# Patient Record
Sex: FEMALE | Race: WHITE | NOT HISPANIC OR LATINO | Employment: OTHER | URBAN - METROPOLITAN AREA
[De-identification: names, ages, dates, MRNs, and addresses within clinical notes are randomized per-mention and may not be internally consistent; named-entity substitution may affect disease eponyms.]

---

## 2017-01-11 DIAGNOSIS — Z12.11 SPECIAL SCREENING FOR MALIGNANT NEOPLASMS, COLON: Primary | ICD-10-CM

## 2017-01-11 RX ORDER — SODIUM, POTASSIUM,MAG SULFATES 17.5-3.13G
1 SOLUTION, RECONSTITUTED, ORAL ORAL AS DIRECTED
Qty: 354 ML | Refills: 0 | Status: ON HOLD | OUTPATIENT
Start: 2017-01-11 | End: 2017-03-08 | Stop reason: ALTCHOICE

## 2017-02-16 ENCOUNTER — TELEPHONE (OUTPATIENT)
Dept: OBSTETRICS AND GYNECOLOGY | Facility: CLINIC | Age: 57
End: 2017-02-16

## 2017-02-16 DIAGNOSIS — Z12.31 VISIT FOR SCREENING MAMMOGRAM: Primary | ICD-10-CM

## 2017-02-16 NOTE — TELEPHONE ENCOUNTER
----- Message from Juliana Petersen sent at 2/15/2017  4:12 PM CST -----  Contact: Patient   X _1st Request  _  2nd Request  _  3rd Request    Who:ABDULAZIZ ACKERMAN [[id number]]    Why:Ken called to request annual mammogram orders for a 3D mammogram    What Number to Call Back:Isaac can be reached at [phone]     When to Expect a call back: (Before the end of the day)   -- if call after 3:00 call back will be tomorrow.

## 2017-03-08 ENCOUNTER — ANESTHESIA EVENT (OUTPATIENT)
Dept: ENDOSCOPY | Facility: HOSPITAL | Age: 57
End: 2017-03-08
Payer: COMMERCIAL

## 2017-03-08 ENCOUNTER — SURGERY (OUTPATIENT)
Age: 57
End: 2017-03-08

## 2017-03-08 ENCOUNTER — HOSPITAL ENCOUNTER (OUTPATIENT)
Facility: HOSPITAL | Age: 57
Discharge: HOME | End: 2017-03-08
Attending: INTERNAL MEDICINE | Admitting: INTERNAL MEDICINE
Payer: COMMERCIAL

## 2017-03-08 ENCOUNTER — ANESTHESIA (OUTPATIENT)
Dept: ENDOSCOPY | Facility: HOSPITAL | Age: 57
End: 2017-03-08
Payer: COMMERCIAL

## 2017-03-08 VITALS
RESPIRATION RATE: 18 BRPM | OXYGEN SATURATION: 98 % | WEIGHT: 133 LBS | HEART RATE: 68 BPM | RESPIRATION RATE: 17 BRPM | TEMPERATURE: 97 F | BODY MASS INDEX: 22.71 KG/M2 | HEIGHT: 64 IN | DIASTOLIC BLOOD PRESSURE: 61 MMHG | SYSTOLIC BLOOD PRESSURE: 113 MMHG

## 2017-03-08 DIAGNOSIS — Z80.0 FAMILY HISTORY OF COLON CANCER: ICD-10-CM

## 2017-03-08 PROCEDURE — G0105 COLORECTAL SCRN; HI RISK IND: HCPCS | Performed by: INTERNAL MEDICINE

## 2017-03-08 PROCEDURE — D9220A PRA ANESTHESIA: Mod: 33,ANES,, | Performed by: ANESTHESIOLOGY

## 2017-03-08 PROCEDURE — 63600175 PHARM REV CODE 636 W HCPCS: Performed by: NURSE ANESTHETIST, CERTIFIED REGISTERED

## 2017-03-08 PROCEDURE — 37000009 HC ANESTHESIA EA ADD 15 MINS: Performed by: INTERNAL MEDICINE

## 2017-03-08 PROCEDURE — 25000003 PHARM REV CODE 250: Performed by: INTERNAL MEDICINE

## 2017-03-08 PROCEDURE — D9220A PRA ANESTHESIA: Mod: 33,CRNA,, | Performed by: NURSE ANESTHETIST, CERTIFIED REGISTERED

## 2017-03-08 PROCEDURE — 37000008 HC ANESTHESIA 1ST 15 MINUTES: Performed by: INTERNAL MEDICINE

## 2017-03-08 PROCEDURE — G0105 COLORECTAL SCRN; HI RISK IND: HCPCS | Mod: ,,, | Performed by: INTERNAL MEDICINE

## 2017-03-08 RX ORDER — SODIUM CHLORIDE 9 MG/ML
INJECTION, SOLUTION INTRAVENOUS CONTINUOUS
Status: DISCONTINUED | OUTPATIENT
Start: 2017-03-08 | End: 2017-03-08 | Stop reason: HOSPADM

## 2017-03-08 RX ORDER — PROPOFOL 10 MG/ML
VIAL (ML) INTRAVENOUS
Status: DISCONTINUED | OUTPATIENT
Start: 2017-03-08 | End: 2017-03-08

## 2017-03-08 RX ORDER — PROPOFOL 10 MG/ML
VIAL (ML) INTRAVENOUS CONTINUOUS PRN
Status: DISCONTINUED | OUTPATIENT
Start: 2017-03-08 | End: 2017-03-08

## 2017-03-08 RX ADMIN — SODIUM CHLORIDE: 0.9 INJECTION, SOLUTION INTRAVENOUS at 08:03

## 2017-03-08 RX ADMIN — PROPOFOL 70 MG: 10 INJECTION, EMULSION INTRAVENOUS at 08:03

## 2017-03-08 RX ADMIN — PROPOFOL 30 MG: 10 INJECTION, EMULSION INTRAVENOUS at 09:03

## 2017-03-08 RX ADMIN — PROPOFOL 100 MCG/KG/MIN: 10 INJECTION, EMULSION INTRAVENOUS at 08:03

## 2017-03-08 NOTE — IP AVS SNAPSHOT
[facility]  [address]  [facility] [address]  Phone: [phone]           Patient Discharge Instructions     Our goal is to set you up for success. This packet includes information on your condition, medications, and your home care. It will help you to care for yourself so you don't get sicker and need to go back to the hospital.     Please ask your nurse if you have any questions.        There are many details to remember when preparing to leave the hospital. Here is what you will need to do:    1. Take your medicine. If you are prescribed medications, review your Medication List in the following pages. You may have new medications to  at the pharmacy and others that you'll need to stop taking. Review the instructions for how and when to take your medications. Talk with your doctor or nurses if you are unsure of what to do.     2. Go to your follow-up appointments. Specific follow-up information is listed in the following pages. Your may be contacted by a transition nurse or clinical provider about future appointments. Be sure we have all of the phone numbers to reach you, if needed. Please contact your provider's office if you are unable to make an appointment.     3. Watch for warning signs. Your doctor or nurse will give you detailed warning signs to watch for and when to call for assistance. These instructions may also include educational information about your condition. If you experience any of warning signs to your health, call your doctor.               [facility]  Unless otherwise directed by your provider, please contact [facility], our nurse care line that is available for 24/7 assistance.     [phone] (toll-free)    Registered nurses in the [facility] provide clinical advisement, health education, appointment booking, and other advisory services.                    ** Verify the list of medication(s) below is accurate and up  to date. Carry this with you in case of emergency. If your medications have changed, please notify your healthcare provider.             Medication List      CONTINUE taking these medications        Additional Info                      ciprofloxacin HCl 500 MG tablet   Commonly known as:  CIPRO   Quantity:  14 tablet   Refills:  0   Dose:  500 mg    Instructions:  Take 1 tablet (500 mg total) by mouth 2 (two) times daily.     Begin Date    AM    Noon    PM    Bedtime       ESTRING 2 mg vaginal ring   Quantity:  1 each   Refills:  3   Generic drug:  estradiol    Instructions:  insert vaginally every 3 MONTHS     Begin Date    AM    Noon    PM    Bedtime       FEMRING 0.1 mg/24 hr Ring   Quantity:  1 each   Refills:  3   Dose:  1 Units   Generic drug:  estradiol acetate    Instructions:  Place 1 Units vaginally as needed (one every 3 months).     Begin Date    AM    Noon    PM    Bedtime       metronidazole 500 MG tablet   Commonly known as:  FLAGYL   Refills:  0      Begin Date    AM    Noon    PM    Bedtime       pantoprazole 40 MG tablet   Commonly known as:  PROTONIX   Quantity:  90 tablet   Refills:  3    Instructions:  take 1 tablet by mouth by mouth every morning 45 MINUTES BEFORE BREAKFAST     Begin Date    AM    Noon    PM    Bedtime                  Please bring to all follow up appointments:    1. A copy of your discharge instructions.  2. All medicines you are currently taking in their original bottles.  3. Identification and insurance card.    Please arrive 15 minutes ahead of scheduled appointment time.    Please call 24 hours in advance if you must reschedule your appointment and/or time.        Your Scheduled Appointments     Mar 09, 2017  7:45 AM CST   Mammo Zain Screening with [facility] MAMMO1   [facility] ([facility])    [address] [address]   [phone]            Mar 09, 2017  8:30 AM CST   Well Women Established Patient with Maria Alejandra Jeffers MD   [facility] -  OB/GYN [address] ([facility])    [address]  [address]  [facility] [address]   [phone]            Apr 06, 2017  1:00 PM CDT   Annual Checkup/Physical with Lily Stoddard MD   [facility] Internal Medicine ([facility])    [address] [address] [phone]              Your Future Surgeries/Procedures     Mar 17, 2017   Surgery with Claude S. Williams IV, MD   [facility] ([facility])    [address] [address] [phone]                Discharge Instructions     Future Orders    Activity as tolerated     Diet general     Questions:    Total calories:      Fat restriction, if any:      Protein restriction, if any:      Na restriction, if any:      Fluid restriction:      Additional restrictions:          Discharge Instructions         Colonoscopy     A camera attached to a flexible tube with a viewing lens is used to take video pictures.     Colonoscopy is a test to view the inside of your lower digestive tract (colon and rectum). Sometimes it can show the last part of the small intestine (ileum). During the test, small pieces of tissue may be removed for testing. This is called a biopsy. Small growths, such as polyps, may also be removed.   Why is colonoscopy done?  The test is done to help look for colon cancer. And it can help find the source of abdominal pain, bleeding, and changes in bowel habits. It may be needed once a year, depending on factors such as your:  · Age  · Health history  · Family health history  · Symptoms  · Results from any prior colonoscopy  Risks and possible complications  These include:  · Bleeding               · A puncture or tear in the colon   · Risks of anesthesia  · A cancer lesion not being seen  Getting ready   To prepare for the test:  · Talk with your healthcare provider about the risks of the test (see below). Also ask your healthcare provider about alternatives to the test.  · Tell your healthcare  provider about any medicines you take. Also tell him or her about any health conditions you may have.  · Make sure your rectum and colon are empty for the test. Follow the diet and bowel prep instructions exactly. If you dont, the test may need to be rescheduled.  · Plan for a friend or family member to drive you home after the test.     Colonoscopy provides an inside view of the entire colon.     You may discuss the results with your doctor right away or at a future visit.  During the test   The test is usually done in the hospital on an outpatient basis. This means you go home the same day. The procedure takes about 30 minutes. During that time:  · You are given relaxing (sedating) medicine through an IV line. You may be drowsy, or fully asleep.  · The healthcare provider will first give you a physical exam to check for anal and rectal problems.  · Then the anus is lubricated and the scope inserted.  · If you are awake, you may have a feeling similar to needing to have a bowel movement. You may also feel pressure as air is pumped into the colon. Its OK to pass gas during the procedure.  · Biopsy, polyp removal, or other treatments may be done during the test.  After the test   You may have gas right after the test. It can help to try to pass it to help prevent later bloating. Your healthcare provider may discuss the results with you right away. Or you may need to schedule a follow-up visit to talk about the results. After the test, you can go back to your normal eating and other activities. You may be tired from the sedation and need to rest for a few hours.  Date Last Reviewed: 11/1/2016 © 2000-2016 [employer]. [address] [place] [address], [place] [address]. All rights reserved. This information is not intended as a substitute for professional medical care. Always follow your healthcare professional's instructions.            Admission Information     Date & Time Provider Department CSN    3/8/2017   "8:00 AM Torey Macias MD [facility] [id number]      Care Providers     Provider Role Specialty Primary office phone    Torey Macias MD Attending Provider Gastroenterology [phone]    Torey Macias MD Surgeon  Gastroenterology [phone]      Your Vitals Were     BP Pulse Temp Resp Height Weight    101/56 68 97.4 °F (36.3 °C) 20 5' 4" (1.626 m) 60.3 kg (133 lb)    SpO2 BMI             95% 22.83 kg/m2         Recent Lab Values        11/4/2011 4/5/2016                       10:20 AM  7:50 AM          A1C 5.7 5.5                     Allergies as of 3/8/2017     No Known Allergies      Advance Directives     An advance directive is a document which, in the event you are no longer able to make decisions for yourself, tells your healthcare team what kind of treatment you do or do not want to receive, or who you would like to make those decisions for you.  If you do not currently have an advance directive, [facility] encourages you to create one.  For more information call:  [phone]), [phone] ([phone]),  or log on to [email or web address].        Language Assistance Services     ATTENTION: Language assistance services are available, free of charge. Please call [phone].      ATENCIÓN: Si habla español, tiene a david disposición servicios gratuitos de asistencia lingüística. Llame al [phone].     CHÚ Ý: N?u b?n nói Ti?ng Vi?t, có các d?ch v? h? tr? ngôn ng? mi?n phí dành cho b?n. G?i s? [phone].         [facility] complies with applicable Federal civil rights laws and does not discriminate on the basis of race, color, national origin, age, disability, or sex.        "

## 2017-03-08 NOTE — DISCHARGE INSTRUCTIONS
Colonoscopy     A camera attached to a flexible tube with a viewing lens is used to take video pictures.     Colonoscopy is a test to view the inside of your lower digestive tract (colon and rectum). Sometimes it can show the last part of the small intestine (ileum). During the test, small pieces of tissue may be removed for testing. This is called a biopsy. Small growths, such as polyps, may also be removed.   Why is colonoscopy done?  The test is done to help look for colon cancer. And it can help find the source of abdominal pain, bleeding, and changes in bowel habits. It may be needed once a year, depending on factors such as your:  · Age  · Health history  · Family health history  · Symptoms  · Results from any prior colonoscopy  Risks and possible complications  These include:  · Bleeding               · A puncture or tear in the colon   · Risks of anesthesia  · A cancer lesion not being seen  Getting ready   To prepare for the test:  · Talk with your healthcare provider about the risks of the test (see below). Also ask your healthcare provider about alternatives to the test.  · Tell your healthcare provider about any medicines you take. Also tell him or her about any health conditions you may have.  · Make sure your rectum and colon are empty for the test. Follow the diet and bowel prep instructions exactly. If you dont, the test may need to be rescheduled.  · Plan for a friend or family member to drive you home after the test.     Colonoscopy provides an inside view of the entire colon.     You may discuss the results with your doctor right away or at a future visit.  During the test   The test is usually done in the hospital on an outpatient basis. This means you go home the same day. The procedure takes about 30 minutes. During that time:  · You are given relaxing (sedating) medicine through an IV line. You may be drowsy, or fully asleep.  · The healthcare provider will first give you a physical exam to  check for anal and rectal problems.  · Then the anus is lubricated and the scope inserted.  · If you are awake, you may have a feeling similar to needing to have a bowel movement. You may also feel pressure as air is pumped into the colon. Its OK to pass gas during the procedure.  · Biopsy, polyp removal, or other treatments may be done during the test.  After the test   You may have gas right after the test. It can help to try to pass it to help prevent later bloating. Your healthcare provider may discuss the results with you right away. Or you may need to schedule a follow-up visit to talk about the results. After the test, you can go back to your normal eating and other activities. You may be tired from the sedation and need to rest for a few hours.  Date Last Reviewed: 11/1/2016  © 4194-1145 [employer]. [address] [place] [address], [place] [address]. All rights reserved. This information is not intended as a substitute for professional medical care. Always follow your healthcare professional's instructions.

## 2017-03-08 NOTE — H&P
[facility]  History & Physical    Subjective:      Chief Complaint/Reason for Admission:    Colonoscopy . No Stephen    Xochitl Weiss is a 56 y.o. female.    Past Medical History:   Diagnosis Date    BRCA negative     Colon polyp     Diverticula of colon     Diverticulitis     Family history of breast cancer     mother    Family history of colon cancer     2 family members over age 60    GERD (gastroesophageal reflux disease)     Kidney stones     Mild hyperlipidemia      Past Surgical History:   Procedure Laterality Date    breast augmentation      BREAST LUMPECTOMY      below right breast     SECTION      x 2    COLONOSCOPY      HYSTERECTOMY      OOPHORECTOMY      TUBAL LIGATION      URETERAL STENT PLACEMENT       Family History   Problem Relation Age of Onset    Colon cancer Father     Breast cancer Mother 68    Colon cancer Mother 70     twice    Uterine cancer Mother     Diabetes Mother     Hypertension Mother     Breast cancer Maternal Aunt     Ovarian cancer Maternal Aunt     Lung cancer Maternal Uncle      Social History   Substance Use Topics    Smoking status: Never Smoker    Smokeless tobacco: Never Used    Alcohol use 0.0 oz/week     0 Standard drinks or equivalent per week      Comment: socially       PTA Medications   Medication Sig    pantoprazole (PROTONIX) 40 MG tablet take 1 tablet by mouth by mouth every morning 45 MINUTES BEFORE BREAKFAST    ciprofloxacin HCl (CIPRO) 500 MG tablet Take 1 tablet (500 mg total) by mouth 2 (two) times daily.    ESTRING 2 mg vaginal ring insert vaginally every 3 MONTHS    FEMRING 0.1 mg/24 hr Ring Place 1 Units vaginally as needed (one every 3 months).    metronidazole (FLAGYL) 500 MG tablet      Review of patient's allergies indicates:  No Known Allergies     Review of Systems   Constitutional: Negative for chills, fever and weight loss.   Respiratory: Negative for shortness of breath and wheezing.     Cardiovascular: Negative for chest pain.   Gastrointestinal: Negative for abdominal pain, blood in stool, constipation, diarrhea and melena.       Objective:      Vital Signs (Most Recent)  Temp: 97.9 °F (36.6 °C) (03/08/17 0812)  Pulse: 92 (03/08/17 0812)  Resp: 16 (03/08/17 0812)  BP: (!) 134/58 (03/08/17 0812)  SpO2: 97 % (03/08/17 0812)    Vital Signs Range (Last 24H):  Temp:  [97.9 °F (36.6 °C)]   Pulse:  [92]   Resp:  [16-49]   BP: (134)/(58)   SpO2:  [97 %]     Physical Exam   Constitutional: She is oriented to person, place, and time. She appears well-developed and well-nourished.   Cardiovascular: Normal rate.    Pulmonary/Chest: Effort normal.   Abdominal: Soft. She exhibits no distension. There is no tenderness.   Neurological: She is alert and oriented to person, place, and time.   Psychiatric: She has a normal mood and affect. Her behavior is normal. Judgment and thought content normal.           Assessment:      Active Hospital Problems    Diagnosis  POA    Family history of colon cancer [Z80.0]  Not Applicable      Resolved Hospital Problems    Diagnosis Date Resolved POA   No resolved problems to display.       Plan:    Screening colonoscopy Father with colon cancer at 68. Mother with colon cancer at   70. Mother with uterine cancer at 30, mother with breast cancer at 68.   Mother's sister with ovarian cancer at 60, mother's brother with lung cancer   around 60 years of age.

## 2017-03-08 NOTE — ANESTHESIA PREPROCEDURE EVALUATION
03/08/2017  Xochitl Weiss is a 56 y.o., female.    [facility] Anesthesia Evaluation    I have reviewed the Patient Summary Reports.    I have reviewed the Nursing Notes.   I have reviewed the Medications.     Review of Systems  Hematology/Oncology:  Hematology Normal   Oncology Normal     EENT/Dental:EENT/Dental Normal   Pulmonary:  Pulmonary Normal    Renal/:   Chronic Renal Disease renal calculi    Hepatic/GI:   Bowel Prep. GERD    Neurological:   Neuromuscular Disease,    Endocrine:  Endocrine Normal        Physical Exam  General:  Well nourished    Airway/Jaw/Neck:  Airway Findings: Mouth Opening: Normal Tongue: Normal  General Airway Assessment: Adult  Mallampati: I      Dental:  Dental Findings: In tact   Chest/Lungs:  Chest/Lungs Findings: Clear to auscultation, Normal Respiratory Rate     Heart/Vascular:  Heart Findings: Rate: Normal  Rhythm: Regular Rhythm        Mental Status:  Mental Status Findings:  Cooperative, Alert and Oriented         Anesthesia Plan  Type of Anesthesia, risks & benefits discussed:  Anesthesia Type:  general  Patient's Preference:   Intra-op Monitoring Plan:   Intra-op Monitoring Plan Comments:   Post Op Pain Control Plan:   Post Op Pain Control Plan Comments:   Induction:   IV  Beta Blocker:  Patient is not currently on a Beta-Blocker (No further documentation required).       Informed Consent: Patient understands risks and agrees with Anesthesia plan.  Questions answered. Anesthesia consent signed with patient.  ASA Score: 2     Day of Surgery Review of History & Physical:    H&P update referred to the surgeon.         Ready For Surgery From Anesthesia Perspective.

## 2017-03-08 NOTE — ANESTHESIA POSTPROCEDURE EVALUATION
"Anesthesia Post Evaluation    Patient: Xochitl Weiss    Procedure(s) Performed: Procedure(s) (LRB):  COLONOSCOPY (N/A)    Final Anesthesia Type: general  Patient location during evaluation: GI PACU  Patient participation: Yes- Able to Participate  Level of consciousness: awake and alert  Post-procedure vital signs: reviewed and stable  Pain management: adequate  Airway patency: patent  PONV status at discharge: No PONV  Anesthetic complications: no      Cardiovascular status: blood pressure returned to baseline  Respiratory status: unassisted, spontaneous ventilation and room air  Hydration status: euvolemic  Follow-up not needed.        Visit Vitals    /61    Pulse 68    Temp 36.3 °C (97.4 °F)    Resp 18    Ht 5' 4" (1.626 m)    Wt 60.3 kg (133 lb)    SpO2 98%    Breastfeeding No    BMI 22.83 kg/m2       Pain/Laura Score: Pain Assessment Performed: Yes (3/8/2017  9:39 AM)  Presence of Pain: denies (3/8/2017  9:39 AM)  Laura Score: 10 (3/8/2017  9:39 AM)      "

## 2017-03-08 NOTE — TRANSFER OF CARE
"Anesthesia Transfer of Care Note    Patient: Xochitl Weiss    Procedure(s) Performed: Procedure(s) (LRB):  COLONOSCOPY (N/A)    Patient location: GI    Anesthesia Type: general    Transport from OR: Transported from OR on room air with adequate spontaneous ventilation    Post pain: adequate analgesia    Post assessment: no apparent anesthetic complications    Post vital signs: stable    Level of consciousness: awake    Nausea/Vomiting: no nausea/vomiting    Complications: none          Last vitals:   Visit Vitals    BP (!) 134/58 (Patient Position: Lying, BP Method: Automatic)    Pulse 92    Temp 36.6 °C (97.9 °F) (Oral)    Resp 16    Ht 5' 4" (1.626 m)    Wt 60.3 kg (133 lb)    SpO2 97%    Breastfeeding No    BMI 22.83 kg/m2     "

## 2017-03-09 ENCOUNTER — HOSPITAL ENCOUNTER (OUTPATIENT)
Dept: RADIOLOGY | Facility: OTHER | Age: 57
Discharge: HOME | End: 2017-03-09
Attending: OBSTETRICS & GYNECOLOGY
Payer: COMMERCIAL

## 2017-03-09 ENCOUNTER — OFFICE VISIT (OUTPATIENT)
Dept: OBSTETRICS AND GYNECOLOGY | Facility: CLINIC | Age: 57
End: 2017-03-09
Attending: OBSTETRICS & GYNECOLOGY
Payer: COMMERCIAL

## 2017-03-09 VITALS
SYSTOLIC BLOOD PRESSURE: 116 MMHG | BODY MASS INDEX: 23.07 KG/M2 | WEIGHT: 135.13 LBS | DIASTOLIC BLOOD PRESSURE: 74 MMHG | HEIGHT: 64 IN

## 2017-03-09 DIAGNOSIS — Z01.419 ENCOUNTER FOR GYNECOLOGICAL EXAMINATION WITHOUT ABNORMAL FINDING: Primary | ICD-10-CM

## 2017-03-09 DIAGNOSIS — N95.2 VAGINAL ATROPHY: ICD-10-CM

## 2017-03-09 DIAGNOSIS — Z12.31 VISIT FOR SCREENING MAMMOGRAM: ICD-10-CM

## 2017-03-09 PROCEDURE — 99999 PR PBB SHADOW E&M-EST. PATIENT-LVL II: CPT | Mod: PBBFAC,,, | Performed by: OBSTETRICS & GYNECOLOGY

## 2017-03-09 PROCEDURE — 99396 PREV VISIT EST AGE 40-64: CPT | Mod: S$GLB,,, | Performed by: OBSTETRICS & GYNECOLOGY

## 2017-03-09 PROCEDURE — 77067 SCR MAMMO BI INCL CAD: CPT | Mod: 26,,, | Performed by: RADIOLOGY

## 2017-03-09 PROCEDURE — 77063 BREAST TOMOSYNTHESIS BI: CPT | Mod: 26,,, | Performed by: RADIOLOGY

## 2017-03-09 PROCEDURE — 77067 SCR MAMMO BI INCL CAD: CPT | Mod: TC

## 2017-03-09 NOTE — MR AVS SNAPSHOT
[facility] [address]  [address]  [address]  [facility] [address]  Phone: [phone]  Fax: [phone]                  Xochitl Weiss   3/9/2017 8:30 AM   Office Visit    Description:  Female : 1960   Provider:  Maria Alejandra Jeffers MD   Department:  [facility] - OB/GYN [address]           Reason for Visit     Gynecologic Exam                To Do List           Future Appointments        Provider Department Dept Phone    2017 1:00 PM Lily Stoddard MD [facility] Internal Medicine [phone]      Your Future Surgeries/Procedures     Mar 17, 2017   Surgery with Claude S. Williams IV, MD   [facility]    [address]  [facility] [address] [phone]              Goals (5 Years of Data)     None      [facility]     [facility] Nurse Care Line -  Assistance  Registered nurses in the [facility] provide clinical advisement, health education, appointment booking, and other advisory services.  Call for this free service at [phone].             Medications           Message regarding Medications     Verify the changes and/or additions to your medication regime listed below are the same as discussed with your clinician today.  If any of these changes or additions are incorrect, please notify your healthcare provider.             Verify that the below list of medications is an accurate representation of the medications you are currently taking.  If none reported, the list may be blank. If incorrect, please contact your healthcare provider. Carry this list with you in case of emergency.           Current Medications     ciprofloxacin HCl (CIPRO) 500 MG tablet Take 1 tablet (500 mg total) by mouth 2 (two) times daily.    ESTRING 2 mg vaginal ring insert vaginally every 3 MONTHS    FEMRING 0.1 mg/24 hr Ring Place 1 Units vaginally as needed (one every 3 months).    metronidazole (FLAGYL) 500 MG tablet      "pantoprazole (PROTONIX) 40 MG tablet take 1 tablet by mouth by mouth every morning 45 MINUTES BEFORE BREAKFAST           Clinical Reference Information           Your Vitals Were     BP Height Weight BMI       116/74 5' 4" (1.626 m) 61.3 kg (135 lb 2.3 oz) 23.2 kg/m2       Blood Pressure          Most Recent Value    BP  116/74      Allergies as of 3/9/2017     No Known Allergies      Immunizations Administered on Date of Encounter - 3/9/2017     None      Language Assistance Services     ATTENTION: Language assistance services are available, free of charge. Please call [phone].      ATENCIÓN: Si habla carmen, tiene a david disposición servicios gratuitos de asistencia lingüística. Llame al [phone].     CHÚ Ý: N?u b?n nói Ti?ng Vi?t, có các d?ch v? h? tr? ngôn ng? mi?n phí dành cho b?n. G?i s? [phone].         [facility] [address] complies with applicable Federal civil rights laws and does not discriminate on the basis of race, color, national origin, age, disability, or sex.        "

## 2017-03-09 NOTE — PROGRESS NOTES
"CC: Well woman exam    Xochitl Weiss is a 56 y.o. female  presents for a well woman exam.  No issues, problems, or complaints.  Content with Estring and would prefer to continue.    S/p hyst/BSO  Last MMG 16--normal, today pending  Last colonoscopy yesterday--5 yr clearance due to family history  Neg Stephen and BRCA testing    Past Medical History:   Diagnosis Date    BRCA negative     Colon polyp     Diverticula of colon     Diverticulitis     Family history of breast cancer     mother    Family history of colon cancer     2 family members over age 60    GERD (gastroesophageal reflux disease)     Kidney stones     Mild hyperlipidemia      Past Surgical History:   Procedure Laterality Date    breast augmentation      BREAST LUMPECTOMY      below right breast     SECTION      x 2    COLONOSCOPY      COLONOSCOPY N/A 3/8/2017    Procedure: COLONOSCOPY;  Surgeon: Torey Macias MD;  Location: [facility] ([address] [place] [address]);  Service: Endoscopy;  Laterality: N/A;    HYSTERECTOMY      OOPHORECTOMY      TUBAL LIGATION       Family History   Problem Relation Age of Onset    Colon cancer Father     Breast cancer Mother 68    Colon cancer Mother 70     twice    Uterine cancer Mother     Diabetes Mother     Hypertension Mother     Breast cancer Maternal Aunt     Ovarian cancer Maternal Aunt     Lung cancer Maternal Uncle      Social History   Substance Use Topics    Smoking status: Never Smoker    Smokeless tobacco: Never Used    Alcohol use 0.0 oz/week     0 Standard drinks or equivalent per week      Comment: socially     OB History      Para Term  AB TAB SAB Ectopic Multiple Living    2 2 2       2          /74  Ht 5' 4" (1.626 m)  Wt 61.3 kg (135 lb 2.3 oz)  BMI 23.2 kg/m2    ROS:  GENERAL: Denies weight gain or weight loss. Feeling well overall.   SKIN: Denies rash or lesions.   HEAD: Denies head injury or headache.   NODES: Denies enlarged lymph nodes. "   CHEST: Denies chest pain or shortness of breath.   CARDIOVASCULAR: Denies palpitations or left sided chest pain.   ABDOMEN: No abdominal pain, constipation, diarrhea, nausea, vomiting or rectal bleeding.   URINARY: No frequency, dysuria, hematuria, or burning on urination.  REPRODUCTIVE: See HPI.   BREASTS: The patient performs breast self-examination and denies pain, lumps, or nipple discharge.   HEMATOLOGIC: No easy bruisability or excessive bleeding.   MUSCULOSKELETAL: Denies joint pain or swelling.   NEUROLOGIC: Denies syncope or weakness.   PSYCHIATRIC: Denies depression, anxiety or mood swings.    PHYSICAL EXAM:   APPEARANCE: Well nourished, well developed, in no acute distress.  AFFECT: WNL, alert and oriented x 3.  SKIN: No acne or hirsutism.  NECK: Neck symmetric without masses or thyromegaly.  NODES: No inguinal, cervical, axillary or femoral lymph node enlargement.  CHEST: Good respiratory effort.   ABDOMEN: Soft. No tenderness or masses. No hepatosplenomegaly. No hernias.  BREASTS: Symmetrical, no skin changes or visible lesions. No palpable masses, nipple discharge bilaterally.  Implants bilaterally.  PELVIC: Normal external female genitalia without lesions. Normal hair distribution. Adequate perineal body, normal urethral meatus. Vagina atrophic without lesions or discharge. No significant cystocele or rectocele. Bimanual exam shows uterus and cervix to be surgically absent. Adnexa without masses or tenderness.  EXTREMITIES: No edema.      ASSESSMENT AND PLAN:    ICD-10-CM ICD-9-CM    1. Encounter for gynecological examination without abnormal finding Z01.419 V72.31    2. Vaginal atrophy N95.2 627.3 estradiol (ESTRING) 2 mg vaginal ring         Patient was counseled today on diet, exercise and A.C.S. Pap guidelines and recommendations for yearly pelvic exams, mammograms and monthly self breast exams; to see her PCP for other health maintenance. Pap no longer indicated.    Return in about 1 year  (around 3/9/2018).

## 2017-03-15 ENCOUNTER — HOSPITAL ENCOUNTER (OUTPATIENT)
Dept: PREADMISSION TESTING | Facility: OTHER | Age: 57
Discharge: HOME | End: 2017-03-15
Attending: ORTHOPAEDIC SURGERY
Payer: COMMERCIAL

## 2017-03-15 ENCOUNTER — TELEPHONE (OUTPATIENT)
Dept: ENDOSCOPY | Facility: HOSPITAL | Age: 57
End: 2017-03-15

## 2017-03-15 ENCOUNTER — ANESTHESIA EVENT (OUTPATIENT)
Dept: SURGERY | Facility: OTHER | Age: 57
End: 2017-03-15
Payer: COMMERCIAL

## 2017-03-15 VITALS
WEIGHT: 136 LBS | TEMPERATURE: 98 F | OXYGEN SATURATION: 96 % | DIASTOLIC BLOOD PRESSURE: 67 MMHG | HEART RATE: 68 BPM | HEIGHT: 64 IN | SYSTOLIC BLOOD PRESSURE: 107 MMHG | BODY MASS INDEX: 23.22 KG/M2

## 2017-03-15 RX ORDER — MIDAZOLAM HYDROCHLORIDE 5 MG/ML
4 INJECTION INTRAMUSCULAR; INTRAVENOUS
Status: CANCELLED | OUTPATIENT
Start: 2017-03-15

## 2017-03-15 RX ORDER — LIDOCAINE HYDROCHLORIDE 10 MG/ML
1 INJECTION, SOLUTION EPIDURAL; INFILTRATION; INTRACAUDAL; PERINEURAL ONCE
Status: CANCELLED | OUTPATIENT
Start: 2017-03-15 | End: 2017-03-15

## 2017-03-15 RX ORDER — OXYCODONE HYDROCHLORIDE 5 MG/1
5 TABLET ORAL ONCE AS NEEDED
Status: CANCELLED | OUTPATIENT
Start: 2017-03-15 | End: 2017-03-15

## 2017-03-15 RX ORDER — FAMOTIDINE 20 MG/1
20 TABLET, FILM COATED ORAL
Status: CANCELLED | OUTPATIENT
Start: 2017-03-15 | End: 2017-03-15

## 2017-03-15 RX ORDER — SODIUM CHLORIDE, SODIUM LACTATE, POTASSIUM CHLORIDE, CALCIUM CHLORIDE 600; 310; 30; 20 MG/100ML; MG/100ML; MG/100ML; MG/100ML
INJECTION, SOLUTION INTRAVENOUS CONTINUOUS
Status: CANCELLED | OUTPATIENT
Start: 2017-03-15

## 2017-03-15 NOTE — IP AVS SNAPSHOT
[facility] Location ([facility])  [address] [place] [address]  Phone: [phone]           Patient Discharge Instructions    Our goal is to set you up for success. This packet includes information on your condition, medications, and your home care. It will help you to care for yourself so you don't get sicker.     Please ask your nurse if you have any questions.        There are many details to remember when preparing for your surgery. Here is what you will need to do, please ask your nurse if there are more specific instructions and if you have any questions:    1. 24 hours before procedure Do not smoke or drink alcoholic beverages 24 hours prior to your procedure    2. Eating before procedure Do not eat or drink anything 8 hours before your procedure - this includes gum, mints, and candy.     3. Day of procedure Please remove all jewelry for the procedure. If you wear contact lenses, dentures, hearing aids or glasses, bring a container to put them in during your surgery and give to a family member for safekeeping.  If your doctor has scheduled you for an overnight stay, bring a small overnight bag with any personal items that you need.    4. After procedure Make arrangements in advance for transportation home by a responsible adult. It is not safe to drive a vehicle during the 24 hours following surgery.     PLEASE NOTE: You may be contacted the day before your surgery to confirm your surgery date and arrival time. The Surgery schedule has many variables which may affect the time of your surgery case. Family members should be available if your surgery time changes.                ** Verify the list of medication(s) below is accurate and up to date. Carry this with you in case of emergency. If your medications have changed, please notify your healthcare provider.             Medication List      TAKE these medications        Additional Info                      estradiol 2 mg vaginal ring    Commonly known as:  ESTRING   Quantity:  1 each   Refills:  3    Instructions:  insert vaginally every 3 MONTHS     Begin Date    AM    Noon    PM    Bedtime       pantoprazole 40 MG tablet   Commonly known as:  PROTONIX   Quantity:  90 tablet   Refills:  3    Instructions:  take 1 tablet by mouth by mouth every morning 45 MINUTES BEFORE BREAKFAST     Begin Date    AM    Noon    PM    Bedtime                  Please bring to all follow up appointments:    1. A copy of your discharge instructions.  2. All medicines you are currently taking in their original bottles.  3. Identification and insurance card.    Please arrive 15 minutes ahead of scheduled appointment time.    Please call 24 hours in advance if you must reschedule your appointment and/or time.        Your Scheduled Appointments     Apr 06, 2017  1:00 PM CDT   Annual Checkup/Physical with Lily Stoddard MD   [facility] ([facility]    [address]  [place] [address] [phone]              Your Future Surgeries/Procedures     Mar 17, 2017   Surgery with Claude S. Williams IV, MD   [facility] ([facility])    [address] [facility] [address] [phone]                  Discharge Instructions       PRE-ADMIT TESTING -  [phone]    [address] [place] [address]        OUTPATIENT SURGERY UNIT - [phone]    Your surgery has been scheduled at [facility]. We are pleased to have the opportunity to serve you. For Further Information please call [phone].    On the day of surgery please report to the Information Desk on the 1st floor.    CONTACT YOUR PHYSICIAN'S OFFICE THE DAY PRIOR TO YOUR SURGERY TO OBTAIN YOUR ARRIVAL TIME.     The evening before surgery do not eat anything after 9 p.m. ( this includes hard candy, chewing gum and mints).  You may have GATORADE, POWERADE AND WATER FROM 9 p.m. until leaving home to come to the hospital.   DO NOT DRINK  ANY LIQUIDS ON THE WAY TO THE HOSPITAL.     SPECIAL MEDICATION INSTRUCTIONS: TAKE medications checked off by the Anesthesiologist on your Medication List.    Angiogram Patients: Take medications as instructed by your physician, including aspirin.     Surgery Patients:    If you take ASPIRIN - Your PHYSICIAN/SURGEON will need to inform you IF/OR when you need to stop taking aspirin prior to your surgery.     Do Not take any medications containing IBUPROFEN.  Do Not Wear any make-up or dark nail polish   (especially eye make-up) to surgery. If you come to surgery with makeup on you will be required to remove the makeup or nail polish.    Do not shave your surgical area at least 5 days prior to your surgery. The surgical prep will be performed at the hospital according to Infection Control regulations.    Leave all valuables at home.   Do Not wear any jewelry or watches, including any metal in body piercings.  Contact Lens must be removed before surgery. Either do not wear the contact lens or bring a case and solution for storage.  Please bring a container for eyeglasses or dentures as required.  Bring any paperwork your physician has provided, such as consent forms,  history and physicals, doctor's orders, etc.   Bring comfortable clothes that are loose fitting to wear upon discharge. Take into consideration the type of surgery being performed.  Maintain your diet as advised per your physician the day prior to surgery.      Adequate rest the night before surgery is advised.   Park in the Parking lot behind the hospital or in the [facility] [place] across the street from the parking lot. Parking is complimentary.  If you will be discharged the same day as your procedure, please arrange for a responsible adult to drive you home or to accompany you if traveling by taxi.   YOU WILL NOT BE PERMITTED TO DRIVE OR TO LEAVE THE HOSPITAL ALONE AFTER SURGERY.   It is strongly recommended that you arrange for someone to  "remain with you for the first 24 hrs following your surgery.       Thank you for your cooperation.  The Staff of [facility].        Bathing Instructions                                                                 Please shower the evening before and morning of your procedure with    ANTIBACTERIAL SOAP. ( DIAL, etc )  Concentrate on the surgical area   for at least 3 minutes and rinse completely. Dry off as usual.   Do not use any deodorant, powder, body lotions, perfume, after shave or    cologne.                                                Admission Information     Date & Time Provider Department CSN    3/15/2017  8:00 AM Claude S. Williams IV, MD [facility] [id number]      Care Providers     Provider Role Specialty Primary office phone    Claude S. Williams IV, MD Attending Provider Orthopedic Surgery [phone]      Your Vitals Were     BP Pulse Temp Height Weight SpO2    107/67 68 98.2 °F (36.8 °C) (Oral) 5' 4" (1.626 m) 61.7 kg (136 lb) 96%    BMI                23.34 kg/m2          Recent Lab Values        11/4/2011 4/5/2016                       10:20 AM  7:50 AM          A1C 5.7 5.5                     Allergies as of 3/15/2017     No Known Allergies      [facility]     [facility] Nurse Care Line - 24/7 Assistance  Unless otherwise directed by your provider, please contact [facility], our nurse care line that is available for 24/7 assistance.     Registered nurses in the [facility] provide clinical advisement, health education, appointment booking, and other advisory services.  Call for this free service at [phone].        Advance Directives     An advance directive is a document which, in the event you are no longer able to make decisions for yourself, tells your healthcare team what kind of treatment you do or do not want to receive, or who you would like to make those decisions for you.  If you do not currently have an " advance directive, [facility] encourages you to create one.  For more information call:  [phone]), [phone] ([phone]),  or log on to [email or web address].        Language Assistance Services     ATTENTION: Language assistance services are available, free of charge. Please call [phone].      ATENCIÓN: Si habla español, tiene a david disposición servicios gratuitos de asistencia lingüística. Llame al [phone].     CHÚ Ý: N?u b?n nói Ti?ng Vi?t, có các d?ch v? h? tr? ngôn ng? mi?n phí dành cho b?n. G?i s? [phone].         [facility] complies with applicable Federal civil rights laws and does not discriminate on the basis of race, color, national origin, age, disability, or sex.

## 2017-03-15 NOTE — ANESTHESIA PREPROCEDURE EVALUATION
03/15/2017  Xochitl Weiss is a 56 y.o., female.    [facility] Anesthesia Evaluation    I have reviewed the Patient Summary Reports.    I have reviewed the Nursing Notes.   I have reviewed the Medications.     Review of Systems  Anesthesia Hx:  Slow to wake up with GA.   Social:  Social Alcohol Use, Non-Smoker    Hematology/Oncology:  Hematology Normal   Oncology Normal     EENT/Dental:EENT/Dental Normal   Cardiovascular:  Cardiovascular Normal Exercise tolerance: good     Pulmonary:  Pulmonary Normal    Renal/:   Chronic Renal Disease renal calculi    Hepatic/GI:   GERD, well controlled    Musculoskeletal:  Musculoskeletal Normal    Neurological:   Neuromuscular Disease,    Endocrine:  Endocrine Normal    Dermatological:  Skin Normal    Psych:  Psychiatric Normal           Physical Exam  General:  Well nourished    Airway/Jaw/Neck:  Airway Findings: Mouth Opening: Normal Tongue: Normal  General Airway Assessment: Adult, Good  Mallampati: I  TM Distance: Normal, at least 6 cm  Jaw/Neck Findings:  Neck ROM: Normal ROM      Dental:  Dental Findings: In tact        Mental Status:  Mental Status Findings:  Cooperative, Alert and Oriented         Anesthesia Plan  Type of Anesthesia, risks & benefits discussed:  Anesthesia Type:  general  Patient's Preference:   Intra-op Monitoring Plan:   Intra-op Monitoring Plan Comments:   Post Op Pain Control Plan:   Post Op Pain Control Plan Comments:   Induction:    Beta Blocker:         Informed Consent: Patient understands risks and agrees with Anesthesia plan.  Questions answered. Anesthesia consent signed with patient.  ASA Score: 2     Day of Surgery Review of History & Physical:    H&P update referred to the surgeon.     Anesthesia Plan Notes: Propofol for injection. No labs.        Ready For Surgery From Anesthesia Perspective.

## 2017-03-17 ENCOUNTER — HOSPITAL ENCOUNTER (OUTPATIENT)
Facility: OTHER | Age: 57
Discharge: HOME | End: 2017-03-17
Attending: ORTHOPAEDIC SURGERY | Admitting: ORTHOPAEDIC SURGERY
Payer: COMMERCIAL

## 2017-03-17 ENCOUNTER — ANESTHESIA (OUTPATIENT)
Dept: SURGERY | Facility: OTHER | Age: 57
End: 2017-03-17
Payer: COMMERCIAL

## 2017-03-17 ENCOUNTER — SURGERY (OUTPATIENT)
Age: 57
End: 2017-03-17

## 2017-03-17 VITALS
BODY MASS INDEX: 23.22 KG/M2 | RESPIRATION RATE: 18 BRPM | DIASTOLIC BLOOD PRESSURE: 67 MMHG | TEMPERATURE: 99 F | OXYGEN SATURATION: 99 % | HEIGHT: 64 IN | SYSTOLIC BLOOD PRESSURE: 97 MMHG | HEART RATE: 57 BPM | WEIGHT: 136 LBS

## 2017-03-17 DIAGNOSIS — M65.311 TRIGGER FINGER OF RIGHT THUMB: Primary | ICD-10-CM

## 2017-03-17 PROCEDURE — 71000015 HC POSTOP RECOV 1ST HR: Performed by: ORTHOPAEDIC SURGERY

## 2017-03-17 PROCEDURE — 37000009 HC ANESTHESIA EA ADD 15 MINS: Performed by: ORTHOPAEDIC SURGERY

## 2017-03-17 PROCEDURE — 63600175 PHARM REV CODE 636 W HCPCS: Performed by: NURSE ANESTHETIST, CERTIFIED REGISTERED

## 2017-03-17 PROCEDURE — 25000003 PHARM REV CODE 250: Performed by: SPECIALIST

## 2017-03-17 PROCEDURE — 36000707: Performed by: ORTHOPAEDIC SURGERY

## 2017-03-17 PROCEDURE — 71000016 HC POSTOP RECOV ADDL HR: Performed by: ORTHOPAEDIC SURGERY

## 2017-03-17 PROCEDURE — 36000706: Performed by: ORTHOPAEDIC SURGERY

## 2017-03-17 PROCEDURE — 25000003 PHARM REV CODE 250: Performed by: NURSE ANESTHETIST, CERTIFIED REGISTERED

## 2017-03-17 PROCEDURE — 25000003 PHARM REV CODE 250: Performed by: ORTHOPAEDIC SURGERY

## 2017-03-17 PROCEDURE — 37000008 HC ANESTHESIA 1ST 15 MINUTES: Performed by: ORTHOPAEDIC SURGERY

## 2017-03-17 RX ORDER — PROPOFOL 10 MG/ML
VIAL (ML) INTRAVENOUS CONTINUOUS PRN
Status: DISCONTINUED | OUTPATIENT
Start: 2017-03-17 | End: 2017-03-17

## 2017-03-17 RX ORDER — HYDROCODONE BITARTRATE AND ACETAMINOPHEN 5; 325 MG/1; MG/1
1 TABLET ORAL EVERY 4 HOURS PRN
Qty: 5 TABLET | Refills: 0 | Status: SHIPPED | OUTPATIENT
Start: 2017-03-17 | End: 2017-05-29

## 2017-03-17 RX ORDER — LIDOCAINE HCL/PF 100 MG/5ML
SYRINGE (ML) INTRAVENOUS
Status: DISCONTINUED | OUTPATIENT
Start: 2017-03-17 | End: 2017-03-17

## 2017-03-17 RX ORDER — OXYCODONE HYDROCHLORIDE 5 MG/1
5 TABLET ORAL
Status: DISCONTINUED | OUTPATIENT
Start: 2017-03-17 | End: 2017-03-17 | Stop reason: HOSPADM

## 2017-03-17 RX ORDER — FAMOTIDINE 20 MG/1
20 TABLET, FILM COATED ORAL
Status: COMPLETED | OUTPATIENT
Start: 2017-03-17 | End: 2017-03-17

## 2017-03-17 RX ORDER — SODIUM CHLORIDE 0.9 % (FLUSH) 0.9 %
3 SYRINGE (ML) INJECTION
Status: DISCONTINUED | OUTPATIENT
Start: 2017-03-17 | End: 2017-03-17 | Stop reason: HOSPADM

## 2017-03-17 RX ORDER — MIDAZOLAM HYDROCHLORIDE 1 MG/ML
INJECTION INTRAMUSCULAR; INTRAVENOUS
Status: DISCONTINUED | OUTPATIENT
Start: 2017-03-17 | End: 2017-03-17

## 2017-03-17 RX ORDER — FENTANYL CITRATE 50 UG/ML
INJECTION, SOLUTION INTRAMUSCULAR; INTRAVENOUS
Status: DISCONTINUED | OUTPATIENT
Start: 2017-03-17 | End: 2017-03-17

## 2017-03-17 RX ORDER — LIDOCAINE HYDROCHLORIDE 10 MG/ML
1 INJECTION, SOLUTION EPIDURAL; INFILTRATION; INTRACAUDAL; PERINEURAL ONCE
Status: DISCONTINUED | OUTPATIENT
Start: 2017-03-17 | End: 2017-03-17 | Stop reason: HOSPADM

## 2017-03-17 RX ORDER — HYDROCODONE BITARTRATE AND ACETAMINOPHEN 5; 325 MG/1; MG/1
1 TABLET ORAL EVERY 4 HOURS PRN
Status: DISCONTINUED | OUTPATIENT
Start: 2017-03-17 | End: 2017-03-17 | Stop reason: HOSPADM

## 2017-03-17 RX ORDER — OXYCODONE HYDROCHLORIDE 5 MG/1
5 TABLET ORAL ONCE AS NEEDED
Status: DISCONTINUED | OUTPATIENT
Start: 2017-03-17 | End: 2017-03-17 | Stop reason: HOSPADM

## 2017-03-17 RX ORDER — DEXAMETHASONE SODIUM PHOSPHATE 4 MG/ML
INJECTION, SOLUTION INTRA-ARTICULAR; INTRALESIONAL; INTRAMUSCULAR; INTRAVENOUS; SOFT TISSUE
Status: DISCONTINUED | OUTPATIENT
Start: 2017-03-17 | End: 2017-03-17

## 2017-03-17 RX ORDER — LIDOCAINE HYDROCHLORIDE 10 MG/ML
INJECTION, SOLUTION EPIDURAL; INFILTRATION; INTRACAUDAL; PERINEURAL
Status: DISCONTINUED | OUTPATIENT
Start: 2017-03-17 | End: 2017-03-17 | Stop reason: HOSPADM

## 2017-03-17 RX ORDER — MIDAZOLAM HYDROCHLORIDE 5 MG/ML
4 INJECTION INTRAMUSCULAR; INTRAVENOUS
Status: DISCONTINUED | OUTPATIENT
Start: 2017-03-17 | End: 2017-03-17 | Stop reason: HOSPADM

## 2017-03-17 RX ORDER — SODIUM CHLORIDE, SODIUM LACTATE, POTASSIUM CHLORIDE, CALCIUM CHLORIDE 600; 310; 30; 20 MG/100ML; MG/100ML; MG/100ML; MG/100ML
INJECTION, SOLUTION INTRAVENOUS CONTINUOUS
Status: DISCONTINUED | OUTPATIENT
Start: 2017-03-17 | End: 2017-03-17 | Stop reason: HOSPADM

## 2017-03-17 RX ORDER — SODIUM CHLORIDE 9 MG/ML
INJECTION, SOLUTION INTRAVENOUS CONTINUOUS
Status: DISCONTINUED | OUTPATIENT
Start: 2017-03-17 | End: 2017-03-17 | Stop reason: HOSPADM

## 2017-03-17 RX ORDER — ONDANSETRON 2 MG/ML
INJECTION INTRAMUSCULAR; INTRAVENOUS
Status: DISCONTINUED | OUTPATIENT
Start: 2017-03-17 | End: 2017-03-17

## 2017-03-17 RX ORDER — HYDROMORPHONE HYDROCHLORIDE 2 MG/ML
0.4 INJECTION, SOLUTION INTRAMUSCULAR; INTRAVENOUS; SUBCUTANEOUS EVERY 5 MIN PRN
Status: DISCONTINUED | OUTPATIENT
Start: 2017-03-17 | End: 2017-03-17 | Stop reason: HOSPADM

## 2017-03-17 RX ORDER — MEPERIDINE HYDROCHLORIDE 50 MG/ML
12.5 INJECTION INTRAMUSCULAR; INTRAVENOUS; SUBCUTANEOUS ONCE AS NEEDED
Status: DISCONTINUED | OUTPATIENT
Start: 2017-03-17 | End: 2017-03-17 | Stop reason: HOSPADM

## 2017-03-17 RX ORDER — ONDANSETRON 2 MG/ML
4 INJECTION INTRAMUSCULAR; INTRAVENOUS ONCE AS NEEDED
Status: DISCONTINUED | OUTPATIENT
Start: 2017-03-17 | End: 2017-03-17 | Stop reason: HOSPADM

## 2017-03-17 RX ORDER — FENTANYL CITRATE 50 UG/ML
25 INJECTION, SOLUTION INTRAMUSCULAR; INTRAVENOUS EVERY 5 MIN PRN
Status: DISCONTINUED | OUTPATIENT
Start: 2017-03-17 | End: 2017-03-17 | Stop reason: HOSPADM

## 2017-03-17 RX ADMIN — FENTANYL CITRATE 50 MCG: 50 INJECTION, SOLUTION INTRAMUSCULAR; INTRAVENOUS at 07:03

## 2017-03-17 RX ADMIN — PROPOFOL 25 MCG/KG/MIN: 10 INJECTION, EMULSION INTRAVENOUS at 07:03

## 2017-03-17 RX ADMIN — ONDANSETRON 4 MG: 2 INJECTION INTRAMUSCULAR; INTRAVENOUS at 07:03

## 2017-03-17 RX ADMIN — SODIUM CHLORIDE, SODIUM LACTATE, POTASSIUM CHLORIDE, AND CALCIUM CHLORIDE: 600; 310; 30; 20 INJECTION, SOLUTION INTRAVENOUS at 06:03

## 2017-03-17 RX ADMIN — LIDOCAINE HYDROCHLORIDE 50 MG: 20 INJECTION, SOLUTION INTRAVENOUS at 07:03

## 2017-03-17 RX ADMIN — LIDOCAINE HYDROCHLORIDE 10 ML: 10 INJECTION, SOLUTION EPIDURAL; INFILTRATION; INTRACAUDAL; PERINEURAL at 07:03

## 2017-03-17 RX ADMIN — DEXAMETHASONE SODIUM PHOSPHATE 4 MG: 4 INJECTION, SOLUTION INTRAMUSCULAR; INTRAVENOUS at 07:03

## 2017-03-17 RX ADMIN — FAMOTIDINE 20 MG: 20 TABLET, FILM COATED ORAL at 06:03

## 2017-03-17 RX ADMIN — MIDAZOLAM HYDROCHLORIDE 2 MG: 1 INJECTION, SOLUTION INTRAMUSCULAR; INTRAVENOUS at 07:03

## 2017-03-17 NOTE — BRIEF OP NOTE
[facility]  Brief Operative Note     SUMMARY     Surgery Date: 3/17/2017     Surgeon(s) and Role:     * Claude S. Williams IV, MD - Primary    Assisting Surgeon: None    Pre-op Diagnosis:  Trigger finger of right thumb [M65.311]  Trigger finger, right middle finger [M65.331]    Post-op Diagnosis:  Post-Op Diagnosis Codes:     * Trigger finger of right thumb [M65.311]     * Trigger finger, right middle finger [M65.331]    Procedure(s) (LRB):  RELEASE-FINGER-TRIGGER-RIGHT THUMB AND LONG FINGER TRIGGER RELEASE (Right)    Anesthesia: Local MAC    Description of the findings of the procedure: right thumb and long finger trigger    Findings/Key Components: as above    Estimated Blood Loss: * minimal         Specimens:   Specimen     None          Discharge Note    SUMMARY     Admit Date: 3/17/2017    Discharge Date and Time:  03/17/2017 8:04 AM    Hospital Course (synopsis of major diagnoses, care, treatment, and services provided during the course of the hospital stay): uneventful     Final Diagnosis: Post-Op Diagnosis Codes:     * Trigger finger of right thumb [M65.311]     * Trigger finger, right middle finger [M65.331]    Disposition: Home or Self Care    Follow Up/Patient Instructions:     Medications:  Reconciled Home Medications:   Current Discharge Medication List      START taking these medications    Details   hydrocodone-acetaminophen 5-325mg (NORCO) 5-325 mg per tablet Take 1 tablet by mouth every 4 (four) hours as needed for Pain.  Qty: 5 tablet, Refills: 0         CONTINUE these medications which have NOT CHANGED    Details   estradiol (ESTRING) 2 mg vaginal ring insert vaginally every 3 MONTHS  Qty: 1 each, Refills: 3    Associated Diagnoses: Vaginal atrophy      pantoprazole (PROTONIX) 40 MG tablet take 1 tablet by mouth by mouth every morning 45 MINUTES BEFORE BREAKFAST  Qty: 90 tablet, Refills: 3    Associated Diagnoses: GERD (gastroesophageal reflux disease)             Discharge  Procedure Orders  Diet general     Call MD for:  temperature >100.4     Call MD for:  persistent nausea and vomiting     Call MD for:  severe uncontrolled pain     Call MD for:  difficulty breathing, headache or visual disturbances     Call MD for:  redness, tenderness, or signs of infection (pain, swelling, redness, odor or green/yellow discharge around incision site)     Call MD for:  hives     Call MD for:  persistent dizziness or light-headedness     Call MD for:  extreme fatigue     Leave dressing on - Keep it clean, dry, and intact until clinic visit     Keep surgical extremity elevated     Lifting restrictions     No driving, operating heavy equipment or signing legal documents while taking pain medication       Follow-up Information     Follow up with Claude S. Williams Iv, MD In 1 week.    Specialty:  Orthopedic Surgery    Contact information:    [address]  [facility] [id number] [phone]

## 2017-03-17 NOTE — DISCHARGE INSTRUCTIONS
NO HEAVY LIFTING          Anesthesia: Monitored Anesthesia Care (MAC)  Youre due to have surgery. During surgery, youll be given medication called anesthesia. This will keep you comfortable and pain-free. Your surgeon will use monitored anesthesia care (MAC). This sheet tells you more about this type of anesthesia.    What is monitored anesthesia care?  MAC keeps you very drowsy during surgery. You may be awake, but you will likely not remember much. And you wont feel pain. With MAC, medications are given through an IV line into a vein in your arm or hand. A local anesthetic will usually be injected into the skin and muscle around the surgical site to numb it. The anesthesia provider monitors you during the procedure. He or she checks your heart rate and rhythm, blood pressure, and blood oxygen level.  Anesthesia tools and medications that may be near you during your procedure  You will likely have:  · A pulse oximeter on the end of your finger. This measures your blood oxygen level.  · Electrocardiography leads (electrodes) on your chest. These record your heart rate and rhythm.  · Medications given through an IV. These relax you and prevent pain. You may be awake or sleep lightly. If you have local anesthetic, it is injected directly into your skin.  · A facemask to give you oxygen, if needed.  Risks and Possible Complications  MAC has some risks. These include:  · Breathing problems  · Nausea and vomiting  · Allergic reaction to the anesthetic    Anesthesia safety  · Follow all instructions you are given for how long not to eat or drink before your procedure.  · Be sure your doctor knows what medications you take, especially any anti-inflammatory medication or blood thinners. This includes aspirin and any other over-the-counter medications, herbs, and supplements.  · Have an adult family member or friend drive you home after the procedure.  · For the first 24 hours after your surgery:  ¨ Do not drive or  use heavy equipment.  ¨ Do not make important decisions or sign documents.  ¨ Avoid alcohol.  ¨ Have someone stay with you, if possible. They can watch for problems and help keep you safe.  Date Last Reviewed: 10/16/2014  © 1785-6637 [employer]. [address] [place] [address], [place] [address]. All rights reserved. This information is not intended as a substitute for professional medical care. Always follow your healthcare professional's instructions.

## 2017-03-17 NOTE — OP NOTE
DATE OF PROCEDURE:  03/17/2017.    PREOPERATIVE DIAGNOSES:  Right hand thumb stenosing tenosynovitis trigger thumb   and right hand long finger stenosing tenosynovitis, trigger finger.    POSTOPERATIVE DIAGNOSES:  Right hand thumb stenosing tenosynovitis trigger thumb   and right hand long finger stenosing tenosynovitis, trigger finger.    PROCEDURE PERFORMED:  Right hand operative release of long finger A1 pulley,   right thumb open pulley release flexor tenosynovectomy.    INDICATIONS:  Ms. Weiss is a 56-year-old woman who has had pain and catching in   the right thumb and long finger which has been unrelieved with conservative   measures.  After the potential benefits as well as risks and complications of   operative intervention were reviewed, the patient was elected to undergo the   above procedure.    PROCEDURE IN DETAIL:  After proper informed consent was obtained, the patient   was transported to the Operating Suite, placed supine on the operating table.    IV sedation was administered, local anesthetic to the right hand using 1%   lidocaine without epinephrine.  Thorough sterile prep and drape in the usual   fashion.  A well-padded upper arm tourniquet was applied.  Routine preoperative   timeout was taken and the operative site was positively identified by the   operative team.  After Esmarch exsanguination, the tourniquet was elevated to   250 mmHg.  An incision was then made in the right palm distal palmar crease.    Careful dissection down through the subdermal tissue using bipolar cautery for   hemostasis.  Care was taken to protect the digital nerve branches and the A1   pulley was identified and then divided longitudinally and completely proximally   and distally as well as the palmar pulley under direct visualization.  There was   some flexor tenosynovitis which was excised and a nodular swelling on the   flexor tendon was freed and the digit was able to be fully flexed and extended   without any  further catching.  Attention was then turned to the right hand long   finger.  The incision was then made over the right thumb flexor crease and   careful dissection down through the subdermal tissue and bipolar cautery for   hemostasis.  The radial and ulnar digital nerves of the thumb were identified   and protected and retracted and the flexor sheath was identified and divided   longitudinally and completely fraying the flexor tendon nodule.  Once this was   released, the patient was able to fully flex the thumb and digits without any   further catching.  Tourniquet was deflated.  Hemostasis was confirmed.  The   incision was then closed with nylon interrupted suture and a sterile soft   dressing was applied.  The patient was awakened in the Operating Suite and taken   to the recovery area in stable condition.  She tolerated the procedure very   well.  Lap, instrument and needle counts were correct.    BLOOD LOSS:  Minimal.    COMPLICATIONS:  None.      NINFA  dd: 03/17/2017 11:09:31 (CDT)  td: 03/17/2017 12:26:36 (CDT)  Doc ID   #[id number]  Job ID #[id number]    CC:

## 2017-03-17 NOTE — H&P
History and physical was scanned. Patient Examined.  No significant change from last History and Physical

## 2017-03-17 NOTE — ANESTHESIA POSTPROCEDURE EVALUATION
"Anesthesia Post Evaluation    Patient: Xochitl Weiss    Procedure(s) Performed: Procedure(s) (LRB):  RELEASE-FINGER-TRIGGER-RIGHT THUMB AND LONG FINGER TRIGGER RELEASE (Right)    Final Anesthesia Type: general  Patient location during evaluation: [facility]  Patient participation: Yes- Able to Participate  Level of consciousness: awake and alert and oriented  Post-procedure vital signs: reviewed and stable  Pain management: adequate  Airway patency: patent  PONV status at discharge: No PONV  Anesthetic complications: no      Cardiovascular status: hemodynamically stable  Respiratory status: unassisted  Hydration status: euvolemic  Follow-up not needed.        Visit Vitals    /63 (BP Location: Left arm, Patient Position: Sitting, BP Method: Automatic)    Pulse 70    Temp 37.3 °C (99.1 °F) (Oral)    Resp 16    Ht 5' 4" (1.626 m)    Wt 61.7 kg (136 lb)    SpO2 98%    Breastfeeding No    BMI 23.34 kg/m2       Pain/Laura Score: Presence of Pain: complains of pain/discomfort (3/17/2017  6:14 AM)      "

## 2017-03-17 NOTE — INTERVAL H&P NOTE
The patient has been examined and the H&P has been reviewed:    I concur with the findings and no changes have occurred since H&P was written.    Anesthesia/Surgery risks, benefits and alternative options discussed and understood by patient/family.          Active Hospital Problems    Diagnosis  POA    Trigger finger of right thumb [M65.311]  Yes      Resolved Hospital Problems    Diagnosis Date Resolved POA   No resolved problems to display.

## 2017-03-17 NOTE — IP AVS SNAPSHOT
[facility] Location ([facility])  [address] [place] [address]  Phone: [phone]           Patient Discharge Instructions     Our goal is to set you up for success. This packet includes information on your condition, medications, and your home care. It will help you to care for yourself so you don't get sicker and need to go back to the hospital.     Please ask your nurse if you have any questions.        There are many details to remember when preparing to leave the hospital. Here is what you will need to do:    1. Take your medicine. If you are prescribed medications, review your Medication List in the following pages. You may have new medications to  at the pharmacy and others that you'll need to stop taking. Review the instructions for how and when to take your medications. Talk with your doctor or nurses if you are unsure of what to do.     2. Go to your follow-up appointments. Specific follow-up information is listed in the following pages. Your may be contacted by a transition nurse or clinical provider about future appointments. Be sure we have all of the phone numbers to reach you, if needed. Please contact your provider's office if you are unable to make an appointment.     3. Watch for warning signs. Your doctor or nurse will give you detailed warning signs to watch for and when to call for assistance. These instructions may also include educational information about your condition. If you experience any of warning signs to your health, call your doctor.               ** Verify the list of medication(s) below is accurate and up to date. Carry this with you in case of emergency. If your medications have changed, please notify your healthcare provider.             Medication List      START taking these medications        Additional Info                      hydrocodone-acetaminophen 5-325mg 5-325 mg per tablet   Commonly known as:  NORCO   Quantity:  5 tablet   Refills:  0    Dose:  1 tablet    Instructions:  Take 1 tablet by mouth every 4 (four) hours as needed for Pain.     Begin Date    AM    Noon    PM    Bedtime         CHANGE how you take these medications        Additional Info                      pantoprazole 40 MG tablet   Commonly known as:  PROTONIX   Quantity:  90 tablet   Refills:  3   What changed:  See the new instructions.    Instructions:  take 1 tablet by mouth by mouth every morning 45 MINUTES BEFORE BREAKFAST     Begin Date    AM    Noon    PM    Bedtime         CONTINUE taking these medications        Additional Info                      estradiol 2 mg vaginal ring   Commonly known as:  ESTRING   Quantity:  1 each   Refills:  3    Instructions:  insert vaginally every 3 MONTHS     Begin Date    AM    Noon    PM    Bedtime            Where to Get Your Medications      You can get these medications from any pharmacy     Bring a paper prescription for each of these medications     hydrocodone-acetaminophen 5-325mg 5-325 mg per tablet                  Please bring to all follow up appointments:    1. A copy of your discharge instructions.  2. All medicines you are currently taking in their original bottles.  3. Identification and insurance card.    Please arrive 15 minutes ahead of scheduled appointment time.    Please call 24 hours in advance if you must reschedule your appointment and/or time.        Your Scheduled Appointments     Apr 06, 2017  1:00 PM CDT   Annual Checkup/Physical with Lily Stoddard MD   [facility] ([facility])    [address]  [place] [address] [phone]              Follow-up Information     Follow up with Claude S. Williams Iv, MD In 1 week.    Specialty:  Orthopedic Surgery    Contact information:    [address] [facility] [id number] [phone]          Discharge Instructions     Future Orders    Call MD for:  difficulty breathing, headache or visual disturbances     Call MD for:  extreme fatigue      Call MD for:  hives     Call MD for:  persistent dizziness or light-headedness     Call MD for:  persistent nausea and vomiting     Call MD for:  redness, tenderness, or signs of infection (pain, swelling, redness, odor or green/yellow discharge around incision site)     Call MD for:  severe uncontrolled pain     Call MD for:  temperature >100.4     Diet general     Questions:    Total calories:      Fat restriction, if any:      Protein restriction, if any:      Na restriction, if any:      Fluid restriction:      Additional restrictions:      Keep surgical extremity elevated     Leave dressing on - Keep it clean, dry, and intact until clinic visit     Lifting restrictions     No driving, operating heavy equipment or signing legal documents while taking pain medication         Discharge Instructions         Anesthesia: Monitored Anesthesia Care (MAC)  Youre due to have surgery. During surgery, youll be given medication called anesthesia. This will keep you comfortable and pain-free. Your surgeon will use monitored anesthesia care (MAC). This sheet tells you more about this type of anesthesia.    What is monitored anesthesia care?  MAC keeps you very drowsy during surgery. You may be awake, but you will likely not remember much. And you wont feel pain. With MAC, medications are given through an IV line into a vein in your arm or hand. A local anesthetic will usually be injected into the skin and muscle around the surgical site to numb it. The anesthesia provider monitors you during the procedure. He or she checks your heart rate and rhythm, blood pressure, and blood oxygen level.  Anesthesia tools and medications that may be near you during your procedure  You will likely have:  · A pulse oximeter on the end of your finger. This measures your blood oxygen level.  · Electrocardiography leads (electrodes) on your chest. These record your heart rate and rhythm.  · Medications given through an IV. These relax you  "and prevent pain. You may be awake or sleep lightly. If you have local anesthetic, it is injected directly into your skin.  · A facemask to give you oxygen, if needed.  Risks and Possible Complications  MAC has some risks. These include:  · Breathing problems  · Nausea and vomiting  · Allergic reaction to the anesthetic    Anesthesia safety  · Follow all instructions you are given for how long not to eat or drink before your procedure.  · Be sure your doctor knows what medications you take, especially any anti-inflammatory medication or blood thinners. This includes aspirin and any other over-the-counter medications, herbs, and supplements.  · Have an adult family member or friend drive you home after the procedure.  · For the first 24 hours after your surgery:  ¨ Do not drive or use heavy equipment.  ¨ Do not make important decisions or sign documents.  ¨ Avoid alcohol.  ¨ Have someone stay with you, if possible. They can watch for problems and help keep you safe.  Date Last Reviewed: 10/16/2014  © 4113-7633 [employer]. [address] [place] [address]. All rights reserved. This information is not intended as a substitute for professional medical care. Always follow your healthcare professional's instructions.            Primary Diagnosis     Your primary diagnosis was:  Acquired Trigger Thumb      Admission Information     Date & Time Provider Department CSN    3/17/2017  5:36 AM Claude S. Williams IV, MD [facility] [id number]      Care Providers     Provider Role Specialty Primary office phone    Claude S. Williams IV, MD Attending Provider Orthopedic Surgery [phone]    Claude S. Williams IV, MD Surgeon  Orthopedic Surgery [phone]      Your Vitals Were     BP Pulse Temp Resp Height Weight    108/63 (BP Location: Left arm, Patient Position: Sitting, BP Method: Automatic) 70 99.1 °F (37.3 °C) (Oral) 16 5' 4" (1.626 m) 61.7 kg (136 lb)    SpO2 BMI             98% " 23.34 kg/m2         Recent Lab Values        11/4/2011 4/5/2016                       10:20 AM  7:50 AM          A1C 5.7 5.5                     Allergies as of 3/17/2017     No Known Allergies      [facility]     [facility] Nurse Care Line - 24/7 Assistance  Unless otherwise directed by your provider, please contact [facility], our nurse care line that is available for 24/7 assistance.     Registered nurses in the [facility] provide clinical advisement, health education, appointment booking, and other advisory services.  Call for this free service at [phone].        Advance Directives     An advance directive is a document which, in the event you are no longer able to make decisions for yourself, tells your healthcare team what kind of treatment you do or do not want to receive, or who you would like to make those decisions for you.  If you do not currently have an advance directive, [facility] encourages you to create one.  For more information call:  [phone]), [phone] ([phone]),  or log on to [email or web address].        Language Assistance Services     ATTENTION: Language assistance services are available, free of charge. Please call [phone].      ATENCIÓN: Si habla español, tiene a david disposición servicios gratuitos de asistencia lingüística. Llame al [phone].     [facility] Ý: N?u b?n nói Ti?ng Vi?t, có các d?ch v? h? tr? ngôn ng? mi?n phí dành cho b?n. G?i s? [phone].         [facility] complies with applicable Federal civil rights laws and does not discriminate on the basis of race, color, national origin, age, disability, or sex.

## 2017-03-22 ENCOUNTER — PATIENT OUTREACH (OUTPATIENT)
Dept: ADMINISTRATIVE | Facility: HOSPITAL | Age: 57
End: 2017-03-22

## 2017-04-06 ENCOUNTER — LAB VISIT (OUTPATIENT)
Dept: LAB | Facility: OTHER | Age: 57
End: 2017-04-06
Attending: INTERNAL MEDICINE
Payer: COMMERCIAL

## 2017-04-06 ENCOUNTER — OFFICE VISIT (OUTPATIENT)
Dept: INTERNAL MEDICINE | Facility: CLINIC | Age: 57
End: 2017-04-06
Attending: INTERNAL MEDICINE
Payer: COMMERCIAL

## 2017-04-06 VITALS
HEART RATE: 75 BPM | BODY MASS INDEX: 23.05 KG/M2 | DIASTOLIC BLOOD PRESSURE: 68 MMHG | SYSTOLIC BLOOD PRESSURE: 104 MMHG | WEIGHT: 134.25 LBS

## 2017-04-06 DIAGNOSIS — Z00.00 ANNUAL PHYSICAL EXAM: Primary | ICD-10-CM

## 2017-04-06 DIAGNOSIS — Z79.890 PREMATURE MENOPAUSE ON HRT: ICD-10-CM

## 2017-04-06 DIAGNOSIS — K64.8 HEMORRHOIDS, INTERNAL: ICD-10-CM

## 2017-04-06 DIAGNOSIS — Z80.3 FAMILY HISTORY OF BREAST CANCER: ICD-10-CM

## 2017-04-06 DIAGNOSIS — B37.2 CANDIDAL INTERTRIGO: ICD-10-CM

## 2017-04-06 DIAGNOSIS — Z80.0 FAMILY HISTORY OF COLON CANCER: ICD-10-CM

## 2017-04-06 DIAGNOSIS — Z00.00 ANNUAL PHYSICAL EXAM: ICD-10-CM

## 2017-04-06 DIAGNOSIS — K57.32 DIVERTICULITIS OF LARGE INTESTINE WITHOUT PERFORATION OR ABSCESS WITHOUT BLEEDING: ICD-10-CM

## 2017-04-06 DIAGNOSIS — K57.30 DIVERTICULOSIS OF LARGE INTESTINE WITHOUT HEMORRHAGE: ICD-10-CM

## 2017-04-06 DIAGNOSIS — E28.319 PREMATURE MENOPAUSE ON HRT: ICD-10-CM

## 2017-04-06 DIAGNOSIS — K21.9 GASTROESOPHAGEAL REFLUX DISEASE, ESOPHAGITIS PRESENCE NOT SPECIFIED: ICD-10-CM

## 2017-04-06 PROBLEM — N20.0 KIDNEY STONES: Status: ACTIVE | Noted: 2017-04-06

## 2017-04-06 PROBLEM — M65.311 TRIGGER FINGER OF RIGHT THUMB: Status: RESOLVED | Noted: 2017-03-17 | Resolved: 2017-04-06

## 2017-04-06 PROBLEM — N20.0 KIDNEY STONES: Status: RESOLVED | Noted: 2017-04-06 | Resolved: 2017-04-06

## 2017-04-06 PROBLEM — K57.92 DIVERTICULITIS: Status: ACTIVE | Noted: 2017-04-06

## 2017-04-06 LAB
ALBUMIN SERPL BCP-MCNC: 4 G/DL
ALP SERPL-CCNC: 80 U/L
ALT SERPL W/O P-5'-P-CCNC: 15 U/L
ANION GAP SERPL CALC-SCNC: 8 MMOL/L
AST SERPL-CCNC: 19 U/L
BASOPHILS # BLD AUTO: 0.01 K/UL
BASOPHILS NFR BLD: 0.1 %
BILIRUB SERPL-MCNC: 0.3 MG/DL
BUN SERPL-MCNC: 21 MG/DL
CALCIUM SERPL-MCNC: 9.5 MG/DL
CHLORIDE SERPL-SCNC: 105 MMOL/L
CO2 SERPL-SCNC: 26 MMOL/L
CREAT SERPL-MCNC: 0.8 MG/DL
DIFFERENTIAL METHOD: ABNORMAL
EOSINOPHIL # BLD AUTO: 0.2 K/UL
EOSINOPHIL NFR BLD: 2.6 %
ERYTHROCYTE [DISTWIDTH] IN BLOOD BY AUTOMATED COUNT: 12.8 %
EST. GFR  (AFRICAN AMERICAN): >60 ML/MIN/1.73 M^2
EST. GFR  (NON AFRICAN AMERICAN): >60 ML/MIN/1.73 M^2
GLUCOSE SERPL-MCNC: 108 MG/DL
HCT VFR BLD AUTO: 42.4 %
HGB BLD-MCNC: 14 G/DL
LYMPHOCYTES # BLD AUTO: 1.9 K/UL
LYMPHOCYTES NFR BLD: 23.2 %
MCH RBC QN AUTO: 28.7 PG
MCHC RBC AUTO-ENTMCNC: 33 %
MCV RBC AUTO: 87 FL
MONOCYTES # BLD AUTO: 0.6 K/UL
MONOCYTES NFR BLD: 7.5 %
NEUTROPHILS # BLD AUTO: 5.3 K/UL
NEUTROPHILS NFR BLD: 66.5 %
PLATELET # BLD AUTO: 266 K/UL
PMV BLD AUTO: 9.1 FL
POTASSIUM SERPL-SCNC: 4.2 MMOL/L
PROT SERPL-MCNC: 7 G/DL
RBC # BLD AUTO: 4.87 M/UL
SODIUM SERPL-SCNC: 139 MMOL/L
TSH SERPL DL<=0.005 MIU/L-ACNC: 2.41 UIU/ML
WBC # BLD AUTO: 7.97 K/UL

## 2017-04-06 PROCEDURE — 80053 COMPREHEN METABOLIC PANEL: CPT

## 2017-04-06 PROCEDURE — 99999 PR PBB SHADOW E&M-EST. PATIENT-LVL III: CPT | Mod: PBBFAC,,, | Performed by: INTERNAL MEDICINE

## 2017-04-06 PROCEDURE — 83036 HEMOGLOBIN GLYCOSYLATED A1C: CPT

## 2017-04-06 PROCEDURE — 99396 PREV VISIT EST AGE 40-64: CPT | Mod: 25,S$GLB,, | Performed by: INTERNAL MEDICINE

## 2017-04-06 PROCEDURE — 85025 COMPLETE CBC W/AUTO DIFF WBC: CPT

## 2017-04-06 PROCEDURE — 1160F RVW MEDS BY RX/DR IN RCRD: CPT | Mod: S$GLB,,, | Performed by: INTERNAL MEDICINE

## 2017-04-06 PROCEDURE — 36415 COLL VENOUS BLD VENIPUNCTURE: CPT

## 2017-04-06 PROCEDURE — 84443 ASSAY THYROID STIM HORMONE: CPT

## 2017-04-06 PROCEDURE — 99212 OFFICE O/P EST SF 10 MIN: CPT | Mod: 25,S$GLB,, | Performed by: INTERNAL MEDICINE

## 2017-04-06 RX ORDER — NYSTATIN AND TRIAMCINOLONE ACETONIDE 100000; 1 [USP'U]/G; MG/G
CREAM TOPICAL 2 TIMES DAILY
Qty: 30 G | Refills: 0 | Status: SHIPPED | OUTPATIENT
Start: 2017-04-06 | End: 2017-06-21

## 2017-04-06 NOTE — MR AVS SNAPSHOT
[facility]  [address]  [place] [address]  Phone: [phone]  Fax: [phone]                  Xochitl Weiss   2017 1:00 PM   Office Visit    Description:  Female : 1960   Provider:  Lily Stoddard MD   Department:  [facility]           Reason for Visit     Annual Exam           Diagnoses this Visit        Comments    Annual physical exam    -  Primary     Family history of colon cancer         Gastroesophageal reflux disease, esophagitis presence not specified         Family history of breast cancer         Diverticulitis of large intestine without perforation or abscess without bleeding         Kidney stones         Premature menopause on HRT                To Do List           Future Appointments        Provider Department Dept Phone    2017 3:00 PM LAB, SAME DAY [facility] [phone]      Goals (5 Years of Data)     None      Follow-Up and Disposition     Return in about 1 year (around 2018) for labs now.      [facility]     [facility] Care [facility] -  Assistance  Unless otherwise directed by your provider, please contact [facility], our nurse care line that is available for  assistance.     Registered nurses in the [facility] provide: appointment scheduling, clinical advisement, health education, and other advisory services.  Call: [phone] (toll free)               Medications           Message regarding Medications     Verify the changes and/or additions to your medication regime listed below are the same as discussed with your clinician today.  If any of these changes or additions are incorrect, please notify your healthcare provider.             Verify that the below list of medications is an accurate representation of the medications you are currently taking.  If none reported, the list may be blank. If incorrect, please contact your healthcare provider.  Carry this list with you in case of emergency.           Current Medications     estradiol (ESTRING) 2 mg vaginal ring insert vaginally every 3 MONTHS    pantoprazole (PROTONIX) 40 MG tablet take 1 tablet by mouth by mouth every morning 45 MINUTES BEFORE BREAKFAST    hydrocodone-acetaminophen 5-325mg (NORCO) 5-325 mg per tablet Take 1 tablet by mouth every 4 (four) hours as needed for Pain.           Clinical Reference Information           Your Vitals Were     BP Pulse Weight BMI       104/68 75 60.9 kg (134 lb 4.2 oz) 23.05 kg/m2       Blood Pressure          Most Recent Value    BP  104/68      Allergies as of 4/6/2017     No Known Allergies      Immunizations Administered on Date of Encounter - 4/6/2017     None      Orders Placed During Today's Visit     Future Labs/Procedures Expected by Expires    CBC auto differential  4/6/2017 4/6/2018    Comprehensive metabolic panel  4/6/2017 4/6/2018    Hemoglobin A1c  4/6/2017 6/5/2017    TSH  4/6/2017 4/6/2018      Language Assistance Services     ATTENTION: Language assistance services are available, free of charge. Please call [phone].      ATENCIÓN: Si habla español, tiene a david disposición servicios gratuitos de asistencia lingüística. Llame al [phone].     CHÚ Ý: N?u b?n nói Ti?ng Vi?t, có các d?ch v? h? tr? ngôn ng? mi?n phí dành cho b?n. G?i s? [phone].         [facility] complies with applicable Federal civil rights laws and does not discriminate on the basis of race, color, national origin, age, disability, or sex.

## 2017-04-06 NOTE — PROGRESS NOTES
Subjective:       Patient ID: Xochitl Weiss is a 56 y.o. female.    Chief Complaint: Annual Exam     Xochitl Weiss is a 56 y.o.  female who presents for Annual Exam  .  Patient Active Problem List   Diagnosis    Encounter for long-term (current) use of other medications    Family history of colon cancer    GERD (gastroesophageal reflux disease)    Hydronephrosis    Family history of breast cancer    Diverticulitis    Premature menopause on HRT    Hemorrhoids, internal    Diverticulosis of large intestine without hemorrhage     HPI Comments:  Xochitl Weiss is a 56 y.o.  female who presents for Annual Exam      She has not returned to work. Taking care of sick family members and supporting spouse at home.  REports less stress. Exercises regularly.     Strong family history of CA, including breast, ovraian, uterine, lung and colon (both parents). Sees sue in GI. REports negative screening for granger syndrome and brca 1 and 2.      She reports a recent rash on her hands between her fingers. + pruritic, has tried otc cortisone cream with no improvement of rash but does help with itching.      Health Maintenance       Date Due Completion Date    Pap Smear 1/23/2012 1/23/2009 (Done)    Override on 1/23/2009: Done    Influenza Vaccine 8/1/2016 9/17/2015    Override on 11/22/2014: Done (per fax received from [facility])    Mammogram 3/9/2018 3/9/2017    Lipid Panel 4/5/2021 4/5/2016    TETANUS VACCINE 9/17/2025 9/17/2015    Colonoscopy 3/8/2027 3/8/2017          Review of Systems   Constitutional: Negative for chills and fever.        Hot flashes   HENT: Negative for rhinorrhea and sore throat.    Respiratory: Negative for cough and shortness of breath.    Cardiovascular: Negative for chest pain and palpitations.   Gastrointestinal: Negative for nausea and vomiting.   Genitourinary: Negative for dysuria and hematuria.   Musculoskeletal: Negative for arthralgias and back pain.   Skin: Positive for rash.  Negative for color change.   Neurological: Negative for weakness and numbness.   Psychiatric/Behavioral: Negative for agitation and dysphoric mood.         Past Medical History:   Diagnosis Date    BRCA negative     Colon polyp     Diverticula of colon     Diverticulitis     Family history of breast cancer     mother    Family history of colon cancer     2 family members over age 60    General anesthetics causing adverse effect in therapeutic use     per patient report slow to awaken    GERD (gastroesophageal reflux disease)     Kidney stones     Mild hyperlipidemia        Past Surgical History:   Procedure Laterality Date    breast augmentation      BREAST LUMPECTOMY      below right breast     SECTION      x 2    COLONOSCOPY      COLONOSCOPY N/A 3/8/2017    Procedure: COLONOSCOPY;  Surgeon: Torey Macias MD;  Location: [facility] ([address] [place] [address]);  Service: Endoscopy;  Laterality: N/A;    HYSTERECTOMY      OOPHORECTOMY      TRIGGER FINGER RELEASE Right 2017    thumb and 3rd digit    TUBAL LIGATION         Family History   Problem Relation Age of Onset    Colon cancer Father     Breast cancer Mother 68    Colon cancer Mother 70     twice    Uterine cancer Mother     Diabetes Mother     Hypertension Mother     Breast cancer Maternal Aunt     Ovarian cancer Maternal Aunt     Lung cancer Maternal Uncle        Social History   Substance Use Topics    Smoking status: Never Smoker    Smokeless tobacco: Never Used    Alcohol use 0.0 oz/week     0 Standard drinks or equivalent per week      Comment: socially             Objective:   Blood pressure 104/68, pulse 75, weight 60.9 kg (134 lb 4.2 oz).     Physical Exam   Constitutional: She is oriented to person, place, and time. She appears well-developed and well-nourished.   HENT:   Head: Normocephalic and atraumatic.   Neck: Carotid bruit is not present. No thyromegaly present.   Cardiovascular: Normal rate and normal heart sounds.   Exam reveals no gallop and no friction rub.    No murmur heard.  Pulmonary/Chest: Effort normal and breath sounds normal. She has no wheezes. She has no rales.   Abdominal: Soft. Bowel sounds are normal. She exhibits no distension. There is no tenderness.   Musculoskeletal: She exhibits no edema or tenderness.   Lymphadenopathy:     She has no cervical adenopathy.        Right: No supraclavicular adenopathy present.        Left: No supraclavicular adenopathy present.   Neurological: She is alert and oriented to person, place, and time. She has normal reflexes.   Skin: Skin is warm and dry. Rash (erythmatous confluent scaling rash between digits with thickened red boarder) noted.   Psychiatric: She has a normal mood and affect. Her behavior is normal.       Prior labs reviewed  Assessment/Plan:        Xochitl was seen today for annual exam.    Diagnoses and all orders for this visit:    Annual physical exam  -     Comprehensive metabolic panel; Future  -     TSH; Future  -     CBC auto differential; Future  -     Hemoglobin A1c; Future  Recommend daily sunscreen, cardiovascular exercise min 30 min 5 days per week. Seatbelts routinely.  Recommend regular self breast exams. Discussed appropriate technique and frequency.    Family history of colon cancer  Comments:  up to date on screening    Candidal intertrigo  -     nystatin-triamcinolone (MYCOLOG II) cream; Apply topically 2 (two) times daily.   call if symptoms worsen or do not resolve.  Refer to derm if no improvement    Gastroesophageal reflux disease, esophagitis presence not specified  Comments:  taking protonix qod  symptoms controlled    Family history of breast cancer  Up to date  See above for screening recs    Premature menopause on HRT  On low dose local estrogen  Recommend decreasing gradually due to strong family history of BRCA    Diverticulitis of large intestine without perforation or abscess without bleeding  Diverticulosis of large intestine without  hemorrhage  Avoid constipation   Call if symptoms return for antibiotic treatment    Hemorrhoids, internal  No issues  Seen on scope    Other orders  -     Cancel: Ambulatory referral to Obstetrics / Gynecology

## 2017-04-07 LAB
ESTIMATED AVG GLUCOSE: 114 MG/DL
HBA1C MFR BLD HPLC: 5.6 %

## 2017-05-29 ENCOUNTER — HOSPITAL ENCOUNTER (EMERGENCY)
Facility: HOSPITAL | Age: 57
Discharge: HOME | End: 2017-05-30
Attending: FAMILY MEDICINE | Admitting: INTERNAL MEDICINE
Payer: COMMERCIAL

## 2017-05-29 DIAGNOSIS — R10.13 EPIGASTRIC ABDOMINAL PAIN: ICD-10-CM

## 2017-05-29 DIAGNOSIS — R11.10 VOMITING, INTRACTABILITY OF VOMITING NOT SPECIFIED, PRESENCE OF NAUSEA NOT SPECIFIED, UNSPECIFIED VOMITING TYPE: ICD-10-CM

## 2017-05-29 DIAGNOSIS — T18.108A ESOPHAGEAL FOREIGN BODY, INITIAL ENCOUNTER: Primary | ICD-10-CM

## 2017-05-29 DIAGNOSIS — T18.128A FOOD IMPACTION OF ESOPHAGUS, INITIAL ENCOUNTER: ICD-10-CM

## 2017-05-29 DIAGNOSIS — T18.128A ESOPHAGEAL OBSTRUCTION DUE TO FOOD IMPACTION: ICD-10-CM
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
## 2017-05-29 DIAGNOSIS — R06.02 SOB (SHORTNESS OF BREATH): ICD-10-CM

## 2017-05-29 LAB
ALBUMIN SERPL BCP-MCNC: 4.1 G/DL
ALP SERPL-CCNC: 83 U/L
ALT SERPL W/O P-5'-P-CCNC: 12 U/L
ANION GAP SERPL CALC-SCNC: 12 MMOL/L
AST SERPL-CCNC: 15 U/L
BASOPHILS # BLD AUTO: 0.02 K/UL
BASOPHILS NFR BLD: 0.2 %
BILIRUB SERPL-MCNC: 0.4 MG/DL
BUN SERPL-MCNC: 17 MG/DL
CALCIUM SERPL-MCNC: 10.1 MG/DL
CHLORIDE SERPL-SCNC: 107 MMOL/L
CO2 SERPL-SCNC: 23 MMOL/L
CREAT SERPL-MCNC: 0.9 MG/DL
DIFFERENTIAL METHOD: ABNORMAL
EOSINOPHIL # BLD AUTO: 0 K/UL
EOSINOPHIL NFR BLD: 0.3 %
ERYTHROCYTE [DISTWIDTH] IN BLOOD BY AUTOMATED COUNT: 12.3 %
EST. GFR  (AFRICAN AMERICAN): >60 ML/MIN/1.73 M^2
EST. GFR  (NON AFRICAN AMERICAN): >60 ML/MIN/1.73 M^2
GLUCOSE SERPL-MCNC: 129 MG/DL
HCT VFR BLD AUTO: 41.9 %
HGB BLD-MCNC: 14.3 G/DL
INR PPP: 1
LIPASE SERPL-CCNC: 28 U/L
LYMPHOCYTES # BLD AUTO: 1.7 K/UL
LYMPHOCYTES NFR BLD: 14.5 %
MCH RBC QN AUTO: 28.4 PG
MCHC RBC AUTO-ENTMCNC: 34.1 %
MCV RBC AUTO: 83 FL
MONOCYTES # BLD AUTO: 0.4 K/UL
MONOCYTES NFR BLD: 3.5 %
NEUTROPHILS # BLD AUTO: 9.6 K/UL
NEUTROPHILS NFR BLD: 81.2 %
PLATELET # BLD AUTO: 293 K/UL
PMV BLD AUTO: 8.4 FL
POTASSIUM SERPL-SCNC: 3.8 MMOL/L
PROT SERPL-MCNC: 7.9 G/DL
PROTHROMBIN TIME: 10.4 SEC
RBC # BLD AUTO: 5.03 M/UL
SODIUM SERPL-SCNC: 142 MMOL/L
WBC # BLD AUTO: 11.85 K/UL

## 2017-05-29 PROCEDURE — 96361 HYDRATE IV INFUSION ADD-ON: CPT

## 2017-05-29 PROCEDURE — 93010 ELECTROCARDIOGRAM REPORT: CPT | Mod: ,,, | Performed by: INTERNAL MEDICINE

## 2017-05-29 PROCEDURE — C9113 INJ PANTOPRAZOLE SODIUM, VIA: HCPCS | Performed by: FAMILY MEDICINE

## 2017-05-29 PROCEDURE — 96375 TX/PRO/DX INJ NEW DRUG ADDON: CPT

## 2017-05-29 PROCEDURE — 84484 ASSAY OF TROPONIN QUANT: CPT

## 2017-05-29 PROCEDURE — 80053 COMPREHEN METABOLIC PANEL: CPT

## 2017-05-29 PROCEDURE — 96374 THER/PROPH/DIAG INJ IV PUSH: CPT

## 2017-05-29 PROCEDURE — 63600175 PHARM REV CODE 636 W HCPCS: Performed by: FAMILY MEDICINE

## 2017-05-29 PROCEDURE — 99285 EMERGENCY DEPT VISIT HI MDM: CPT | Mod: 25

## 2017-05-29 PROCEDURE — 25000003 PHARM REV CODE 250: Performed by: EMERGENCY MEDICINE

## 2017-05-29 PROCEDURE — 99285 EMERGENCY DEPT VISIT HI MDM: CPT | Mod: ,,, | Performed by: EMERGENCY MEDICINE

## 2017-05-29 PROCEDURE — 63600175 PHARM REV CODE 636 W HCPCS: Performed by: EMERGENCY MEDICINE

## 2017-05-29 PROCEDURE — 85025 COMPLETE CBC W/AUTO DIFF WBC: CPT

## 2017-05-29 PROCEDURE — 86900 BLOOD TYPING SEROLOGIC ABO: CPT

## 2017-05-29 PROCEDURE — 25000003 PHARM REV CODE 250: Performed by: FAMILY MEDICINE

## 2017-05-29 PROCEDURE — 83690 ASSAY OF LIPASE: CPT

## 2017-05-29 PROCEDURE — 96376 TX/PRO/DX INJ SAME DRUG ADON: CPT

## 2017-05-29 PROCEDURE — 85610 PROTHROMBIN TIME: CPT

## 2017-05-29 PROCEDURE — 93005 ELECTROCARDIOGRAM TRACING: CPT

## 2017-05-29 PROCEDURE — 86901 BLOOD TYPING SEROLOGIC RH(D): CPT

## 2017-05-29 RX ORDER — ONDANSETRON 2 MG/ML
4 INJECTION INTRAMUSCULAR; INTRAVENOUS
Status: COMPLETED | OUTPATIENT
Start: 2017-05-29 | End: 2017-05-29

## 2017-05-29 RX ORDER — PANTOPRAZOLE SODIUM 40 MG/10ML
40 INJECTION, POWDER, LYOPHILIZED, FOR SOLUTION INTRAVENOUS
Status: COMPLETED | OUTPATIENT
Start: 2017-05-29 | End: 2017-05-29

## 2017-05-29 RX ORDER — METOCLOPRAMIDE HYDROCHLORIDE 5 MG/ML
10 INJECTION INTRAMUSCULAR; INTRAVENOUS
Status: COMPLETED | OUTPATIENT
Start: 2017-05-29 | End: 2017-05-29

## 2017-05-29 RX ORDER — FAMOTIDINE 10 MG/ML
20 INJECTION INTRAVENOUS
Status: COMPLETED | OUTPATIENT
Start: 2017-05-29 | End: 2017-05-29

## 2017-05-29 RX ADMIN — FAMOTIDINE 20 MG: 10 INJECTION, SOLUTION INTRAVENOUS at 11:05

## 2017-05-29 RX ADMIN — ONDANSETRON 4 MG: 2 INJECTION INTRAMUSCULAR; INTRAVENOUS at 10:05

## 2017-05-29 RX ADMIN — METOCLOPRAMIDE 10 MG: 5 INJECTION, SOLUTION INTRAMUSCULAR; INTRAVENOUS at 11:05

## 2017-05-29 RX ADMIN — PANTOPRAZOLE SODIUM 40 MG: 40 INJECTION, POWDER, FOR SOLUTION INTRAVENOUS at 11:05

## 2017-05-29 RX ADMIN — SODIUM CHLORIDE, SODIUM LACTATE, POTASSIUM CHLORIDE, AND CALCIUM CHLORIDE 1000 ML: .6; .31; .03; .02 INJECTION, SOLUTION INTRAVENOUS at 10:05

## 2017-05-30 ENCOUNTER — ANESTHESIA (OUTPATIENT)
Dept: ENDOSCOPY | Facility: HOSPITAL | Age: 57
End: 2017-05-30
Payer: COMMERCIAL

## 2017-05-30 ENCOUNTER — SURGERY (OUTPATIENT)
Age: 57
End: 2017-05-30

## 2017-05-30 ENCOUNTER — ANESTHESIA EVENT (OUTPATIENT)
Dept: ENDOSCOPY | Facility: HOSPITAL | Age: 57
End: 2017-05-30
Payer: COMMERCIAL

## 2017-05-30 VITALS
WEIGHT: 130 LBS | BODY MASS INDEX: 22.2 KG/M2 | SYSTOLIC BLOOD PRESSURE: 123 MMHG | HEART RATE: 68 BPM | TEMPERATURE: 98 F | DIASTOLIC BLOOD PRESSURE: 75 MMHG | OXYGEN SATURATION: 100 % | HEIGHT: 64 IN | RESPIRATION RATE: 14 BRPM

## 2017-05-30 DIAGNOSIS — K22.2 ESOPHAGEAL STRICTURE: Primary | ICD-10-CM
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
## 2017-05-30 PROBLEM — T18.108A ESOPHAGEAL FOREIGN BODY: Status: ACTIVE | Noted: 2017-05-30

## 2017-05-30 PROBLEM — T18.128A FOOD IMPACTION OF ESOPHAGUS: Status: ACTIVE | Noted: 2017-05-30

## 2017-05-30 LAB
ABO + RH BLD: NORMAL
BLD GP AB SCN CELLS X3 SERPL QL: NORMAL
TROPONIN I SERPL DL<=0.01 NG/ML-MCNC: <0.006 NG/ML

## 2017-05-30 PROCEDURE — 43247 EGD REMOVE FOREIGN BODY: CPT | Performed by: INTERNAL MEDICINE

## 2017-05-30 PROCEDURE — 63600175 PHARM REV CODE 636 W HCPCS: Performed by: NURSE ANESTHETIST, CERTIFIED REGISTERED

## 2017-05-30 PROCEDURE — 27201042 HC RETRIEVAL NET: Performed by: INTERNAL MEDICINE

## 2017-05-30 PROCEDURE — 43249 ESOPH EGD DILATION <30 MM: CPT | Mod: ,,, | Performed by: INTERNAL MEDICINE

## 2017-05-30 PROCEDURE — D9220A PRA ANESTHESIA: Mod: ANES,,, | Performed by: ANESTHESIOLOGY

## 2017-05-30 PROCEDURE — 43249 ESOPH EGD DILATION <30 MM: CPT | Performed by: INTERNAL MEDICINE

## 2017-05-30 PROCEDURE — 99285 EMERGENCY DEPT VISIT HI MDM: CPT | Mod: ,,, | Performed by: INTERNAL MEDICINE

## 2017-05-30 PROCEDURE — 37000009 HC ANESTHESIA EA ADD 15 MINS: Performed by: INTERNAL MEDICINE

## 2017-05-30 PROCEDURE — 43247 EGD REMOVE FOREIGN BODY: CPT | Mod: ,,, | Performed by: INTERNAL MEDICINE

## 2017-05-30 PROCEDURE — D9220A PRA ANESTHESIA: Mod: CRNA,,, | Performed by: NURSE ANESTHETIST, CERTIFIED REGISTERED

## 2017-05-30 PROCEDURE — 25000003 PHARM REV CODE 250: Performed by: NURSE ANESTHETIST, CERTIFIED REGISTERED

## 2017-05-30 PROCEDURE — 25500020 PHARM REV CODE 255: Performed by: EMERGENCY MEDICINE

## 2017-05-30 PROCEDURE — C1726 CATH, BAL DIL, NON-VASCULAR: HCPCS | Performed by: INTERNAL MEDICINE

## 2017-05-30 PROCEDURE — 63600175 PHARM REV CODE 636 W HCPCS: Performed by: PHYSICIAN ASSISTANT

## 2017-05-30 PROCEDURE — 37000008 HC ANESTHESIA 1ST 15 MINUTES: Performed by: INTERNAL MEDICINE

## 2017-05-30 RX ORDER — SODIUM CHLORIDE 9 MG/ML
INJECTION, SOLUTION INTRAVENOUS CONTINUOUS PRN
Status: DISCONTINUED | OUTPATIENT
Start: 2017-05-30 | End: 2017-05-30

## 2017-05-30 RX ORDER — FENTANYL CITRATE 50 UG/ML
INJECTION, SOLUTION INTRAMUSCULAR; INTRAVENOUS
Status: DISCONTINUED | OUTPATIENT
Start: 2017-05-30 | End: 2017-05-30

## 2017-05-30 RX ORDER — ROCURONIUM BROMIDE 10 MG/ML
INJECTION, SOLUTION INTRAVENOUS
Status: DISCONTINUED | OUTPATIENT
Start: 2017-05-30 | End: 2017-05-30

## 2017-05-30 RX ORDER — ONDANSETRON 2 MG/ML
4 INJECTION INTRAMUSCULAR; INTRAVENOUS
Status: COMPLETED | OUTPATIENT
Start: 2017-05-30 | End: 2017-05-30

## 2017-05-30 RX ORDER — OMEPRAZOLE 20 MG/1
40 CAPSULE, DELAYED RELEASE ORAL DAILY
Qty: 60 CAPSULE | Refills: 0 | Status: SHIPPED | OUTPATIENT
Start: 2017-05-30 | End: 2017-11-14

## 2017-05-30 RX ORDER — MIDAZOLAM HYDROCHLORIDE 1 MG/ML
INJECTION, SOLUTION INTRAMUSCULAR; INTRAVENOUS
Status: DISCONTINUED | OUTPATIENT
Start: 2017-05-30 | End: 2017-05-30

## 2017-05-30 RX ORDER — ONDANSETRON 4 MG/1
4 TABLET, FILM COATED ORAL EVERY 8 HOURS PRN
Qty: 12 TABLET | Refills: 0 | Status: SHIPPED | OUTPATIENT
Start: 2017-05-30 | End: 2017-06-06

## 2017-05-30 RX ORDER — PROPOFOL 10 MG/ML
VIAL (ML) INTRAVENOUS
Status: DISCONTINUED | OUTPATIENT
Start: 2017-05-30 | End: 2017-05-30

## 2017-05-30 RX ORDER — SODIUM CHLORIDE 9 MG/ML
INJECTION, SOLUTION INTRAVENOUS CONTINUOUS
Status: CANCELLED | OUTPATIENT
Start: 2017-05-30

## 2017-05-30 RX ORDER — SUCCINYLCHOLINE CHLORIDE 20 MG/ML
INJECTION INTRAMUSCULAR; INTRAVENOUS
Status: DISCONTINUED | OUTPATIENT
Start: 2017-05-30 | End: 2017-05-30

## 2017-05-30 RX ADMIN — PROPOFOL 150 MG: 10 INJECTION, EMULSION INTRAVENOUS at 07:05

## 2017-05-30 RX ADMIN — ROCURONIUM BROMIDE 5 MG: 10 INJECTION, SOLUTION INTRAVENOUS at 07:05

## 2017-05-30 RX ADMIN — SUCCINYLCHOLINE CHLORIDE 120 MG: 20 INJECTION, SOLUTION INTRAMUSCULAR; INTRAVENOUS at 07:05

## 2017-05-30 RX ADMIN — MIDAZOLAM HYDROCHLORIDE 2 MG: 1 INJECTION, SOLUTION INTRAMUSCULAR; INTRAVENOUS at 07:05

## 2017-05-30 RX ADMIN — SODIUM CHLORIDE: 0.9 INJECTION, SOLUTION INTRAVENOUS at 07:05

## 2017-05-30 RX ADMIN — IOHEXOL 75 ML: 350 INJECTION, SOLUTION INTRAVENOUS at 01:05

## 2017-05-30 RX ADMIN — ONDANSETRON 4 MG: 2 INJECTION INTRAMUSCULAR; INTRAVENOUS at 02:05

## 2017-05-30 RX ADMIN — FENTANYL CITRATE 100 MCG: 50 INJECTION, SOLUTION INTRAMUSCULAR; INTRAVENOUS at 07:05

## 2017-05-30 NOTE — PROGRESS NOTES
Pt's spouse called by RN and wife, updated on wife's procedure and is on his way to the hospital for pt discharge.

## 2017-05-30 NOTE — PLAN OF CARE
Discharge instructions reviewed with pt, handouts given, verbalized understanding with no further questions at this time. Dr. Vaz spoke to pt at bedside, reviewed procedure and answered questions with MD telephone number provided per AVS sheet. VSS on RA, no pain or nausea noted, tolerating po fluids without difficulty, no other complaints noted. Fall precautions reviewed, consents in chart, PIV removed.

## 2017-05-30 NOTE — PROVIDER PROGRESS NOTES - EMERGENCY DEPT.
Encounter Date: 5/29/2017    ED Physician Progress Notes       SCRIBE NOTE: I, Toni Retsrepo, am scribing for, and in the presence of,  Dr. Hoskins.  Physician Statement: I, Dr. Hoskins, personally performed the services described in this documentation as scribed by Toni Restrepo in my presence, and it is both accurate and complete.     Physician Note:   This is a 56 y.o. female who has been vomiting since this morning. Pt's symptoms seem somewhat suggestive of esophageal stricture or spasm as opposed to viral gastroenteritis. Pt does have a prior history of esophageal problems including prior food impactions, the last one being over 4 years ago, and the pt is normally managed by Dr. Torey Macias. Will start IV fluids and Protonix, order appropriate GI labs, and refer the pt to the main ED for imaging/specialist consultations.     I initially evaluated this patient and ordered workup while in intake.  The patient will receive a full evaluation in an ED pod when space is available.  All results from tests ordered in intake will not be followed by the intake team, including myself. All results will be followed by the ED Pod team.

## 2017-05-30 NOTE — DISCHARGE INSTRUCTIONS

## 2017-05-30 NOTE — SUBJECTIVE & OBJECTIVE
Past Medical History:   Diagnosis Date    BRCA negative     Colon polyp     Diverticula of colon     Diverticulitis     Family history of breast cancer     mother    Family history of colon cancer     2 family members over age 60    General anesthetics causing adverse effect in therapeutic use     per patient report slow to awaken    GERD (gastroesophageal reflux disease)     Kidney stones     Mild hyperlipidemia        Past Surgical History:   Procedure Laterality Date    breast augmentation      BREAST LUMPECTOMY      below right breast     SECTION      x 2    COLONOSCOPY      COLONOSCOPY N/A 3/8/2017    Procedure: COLONOSCOPY;  Surgeon: Torey Macias MD;  Location: [facility] ([address] [place] [address]);  Service: Endoscopy;  Laterality: N/A;    HYSTERECTOMY      OOPHORECTOMY      TRIGGER FINGER RELEASE Right 2017    thumb and 3rd digit    TUBAL LIGATION         Review of patient's allergies indicates:  No Known Allergies  Family History     Problem Relation (Age of Onset)    Breast cancer Mother (68), Maternal Aunt    Colon cancer Father, Mother (70)    Diabetes Mother    Hypertension Mother    Lung cancer Maternal Uncle    Ovarian cancer Maternal Aunt    Uterine cancer Mother        Social History Main Topics    Smoking status: Never Smoker    Smokeless tobacco: Never Used    Alcohol use 0.0 oz/week      Comment: socially    Drug use: No    Sexual activity: Yes     Partners: Male     Birth control/ protection: None, Inserts     Review of Systems   Constitutional: Negative for chills, fatigue and fever.   HENT: Negative for mouth sores and nosebleeds.    Eyes: Negative for pain and redness.   Respiratory: Negative for cough and shortness of breath.    Cardiovascular: Negative for chest pain and palpitations.   Gastrointestinal: Positive for abdominal pain (mild epigatric tenderness). Negative for abdominal distention, blood in stool and constipation.   Genitourinary: Negative for dysuria and  hematuria.   Musculoskeletal: Negative for arthralgias and joint swelling.   Skin: Negative for pallor and rash.   Neurological: Negative for seizures and facial asymmetry.   Psychiatric/Behavioral: Negative for agitation and confusion.     Objective:     Vital Signs (Most Recent):  Temp: 98.5 °F (36.9 °C) (05/30/17 0729)  Pulse: 88 (05/30/17 0729)  Resp: 18 (05/30/17 0729)  BP: 124/82 (05/30/17 0729)  SpO2: 98 % (05/30/17 0729) Vital Signs (24h Range):  Temp:  [98.5 °F (36.9 °C)-99.2 °F (37.3 °C)] 98.5 °F (36.9 °C)  Pulse:  [82-88] 88  Resp:  [16-18] 18  SpO2:  [98 %-100 %] 98 %  BP: (124-141)/(65-82) 124/82     Weight: 59 kg (130 lb) (05/29/17 2233)  Body mass index is 22.31 kg/m².    No intake or output data in the 24 hours ending 05/30/17 0733    Lines/Drains/Airways     Peripheral Intravenous Line                 Peripheral IV - Single Lumen 05/29/17 2253 Left Antecubital less than 1 day                Physical Exam   Constitutional: She is oriented to person, place, and time. She appears well-nourished. No distress.   HENT:   Head: Normocephalic and atraumatic.   Eyes: Conjunctivae are normal. No scleral icterus.   Neck: Neck supple.   Cardiovascular: Normal rate and regular rhythm.    Pulmonary/Chest: Breath sounds normal. No stridor. No respiratory distress.   Abdominal: Soft. She exhibits no distension. There is no tenderness. There is no rebound and no guarding.   Musculoskeletal: She exhibits no tenderness or deformity.   Neurological: She is alert and oriented to person, place, and time.   Skin: Skin is warm and dry. No rash noted.   Psychiatric: She has a normal mood and affect.   Vitals reviewed.      Significant Labs:  CBC:   Recent Labs  Lab 05/29/17 2253   WBC 11.85   HGB 14.3   HCT 41.9        BMP:   Recent Labs  Lab 05/29/17 2253   *      K 3.8      CO2 23   BUN 17   CREATININE 0.9   CALCIUM 10.1     CMP:   Recent Labs  Lab 05/29/17  2253   *   CALCIUM 10.1    ALBUMIN 4.1   PROT 7.9      K 3.8   CO2 23      BUN 17   CREATININE 0.9   ALKPHOS 83   ALT 12   AST 15   BILITOT 0.4     Coagulation:   Recent Labs  Lab 05/29/17  2328   INR 1.0       Significant Imaging:  Imaging results within the past 24 hours have been reviewed.     Reviewed CT findings : see HPI

## 2017-05-30 NOTE — ED PROVIDER NOTES
"Encounter Date: 2017    SCRIBE #1 NOTE: I, Nicole Curran, am scribing for, and in the presence of,  Dr. Oakley. I have scribed the entire note.       History     Chief Complaint   Patient presents with    Abdominal Pain     Pt reports epigastric pain and pink emesis x 11 hours. Reports SOB.    Shortness of Breath     Review of patient's allergies indicates:  No Known Allergies  Time patient was seen by the provider: 11:32 PM      The patient is a 56 y.o. female with hx of: GERD and diverticulitis that presents to the ED with a complaint of epigastric pain. Pt states for the last 12 hours she has been vomiting pink phlegm. Pt ate an orange this morning when she immediately felt nauseous and feels something is "stuck" in her digestive tract. Last night pt consumed wine spritzers and lasagna and reported feeling heart burn. Pt denies fever, chills and diarrhea.          The history is provided by the patient, medical records and a relative.     Past Medical History:   Diagnosis Date    BRCA negative     Colon polyp     Diverticula of colon     Diverticulitis     Family history of breast cancer     mother    Family history of colon cancer     2 family members over age 60    General anesthetics causing adverse effect in therapeutic use     per patient report slow to awaken    GERD (gastroesophageal reflux disease)     Kidney stones     Mild hyperlipidemia      Past Surgical History:   Procedure Laterality Date    breast augmentation      BREAST LUMPECTOMY      below right breast     SECTION      x 2    COLONOSCOPY      COLONOSCOPY N/A 3/8/2017    Procedure: COLONOSCOPY;  Surgeon: Torey Macias MD;  Location: [address]);  Service: Endoscopy;  Laterality: N/A;    HYSTERECTOMY      OOPHORECTOMY      TRIGGER FINGER RELEASE Right 2017    thumb and 3rd digit    TUBAL LIGATION       Family History   Problem Relation Age of Onset    Colon cancer Father     Breast cancer Mother 68 "    Colon cancer Mother 70     twice    Uterine cancer Mother     Diabetes Mother     Hypertension Mother     Breast cancer Maternal Aunt     Ovarian cancer Maternal Aunt     Lung cancer Maternal Uncle      Social History   Substance Use Topics    Smoking status: Never Smoker    Smokeless tobacco: Never Used    Alcohol use 0.0 oz/week      Comment: socially     Review of Systems   Constitutional: Negative for chills and fever.   HENT: Negative for nosebleeds.    Eyes: Negative for redness.   Respiratory: Negative for shortness of breath.    Cardiovascular: Negative for chest pain.   Gastrointestinal: Positive for abdominal pain (epigastric), nausea and vomiting. Negative for diarrhea.   Genitourinary: Negative for difficulty urinating.   Musculoskeletal: Negative for myalgias.   Skin: Negative for rash.   Neurological: Negative for headaches.       Physical Exam     Initial Vitals [05/29/17 2233]   BP Pulse Resp Temp SpO2   131/79 88 16 99.2 °F (37.3 °C) 100 %     Physical Exam    Nursing note and vitals reviewed.  Constitutional: She appears well-developed and well-nourished. She is not diaphoretic. No distress.   HENT:   Head: Normocephalic and atraumatic.   Eyes: EOM are normal. Pupils are equal, round, and reactive to light.   Neck: Normal range of motion. Neck supple.   Cardiovascular: Normal rate and regular rhythm. Exam reveals no gallop and no friction rub.    No murmur heard.  Pulmonary/Chest: Breath sounds normal. No respiratory distress. She has no wheezes. She has no rhonchi. She has no rales.   Abdominal:   (+) moderate epigastric TTP. No brumfield's sign   Musculoskeletal: Normal range of motion. She exhibits no edema or tenderness.   Neurological: She is alert and oriented to person, place, and time. She has normal strength. No cranial nerve deficit or sensory deficit.   Skin: Skin is warm and dry. No rash noted. No erythema.   Psychiatric: She has a normal mood and affect. Her behavior is  normal. Judgment and thought content normal.         ED Course   Procedures  Labs Reviewed   CBC W/ AUTO DIFFERENTIAL - Abnormal; Notable for the following:        Result Value    MPV 8.4 (*)     Gran # 9.6 (*)     Gran% 81.2 (*)     Lymph% 14.5 (*)     Mono% 3.5 (*)     All other components within normal limits   COMPREHENSIVE METABOLIC PANEL - Abnormal; Notable for the following:     Glucose 129 (*)     All other components within normal limits   LIPASE   PROTIME-INR   TROPONIN I   TROPONIN I    Narrative:     add on test troponin per dr isabel baig order #[id number]   05/30/2017  00:16    TYPE & SCREEN     EKG Readings: (Independently Interpreted)   NSR. No ST elevations or depressions. No changes from prior.         X-Rays:   Independently Interpreted Readings:   Chest X-Ray: No acute process   Abdomen: Distal esophageal foreign body     Medical Decision Making:   History:   Old Medical Records: I decided to obtain old medical records.  Initial Assessment:   My initial differential diagnoses include but are not limited to gastritis, ulcer, gastroenteritis, ACS and pancreatitis. Pt with persistent an/v with some chest discomfort that is more consistent with esophageal symptoms. She describes mild SOB in triage. Unclear etiology for her symptoms and initial work up was only concerned about abdominal etiology but this is negative. Will add on cardiac workup and obtain abdominal CT.  Independently Interpreted Test(s):   I have ordered and independently interpreted X-rays - see prior notes.  I have ordered and independently interpreted EKG Reading(s) - see prior notes  Clinical Tests:   Lab Tests: Ordered and Reviewed  Radiological Study: Ordered  Medical Tests: Ordered  Other:   I discussed test(s) with the performing physician.       <> Summary of the Findings: CT abd: distal esophageal fb  I have discussed this case with another health care provider.       <> Summary of the Discussion: GI            Scribe  Attestation:   Scribe #1: I performed the above scribed service and the documentation accurately describes the services I performed. I attest to the accuracy of the note.    Attending Attestation:           Physician Attestation for Scribe:  Physician Attestation Statement for Scribe #1: I, Dr. Oakley, reviewed documentation, as scribed by Nicole Curran in my presence, and it is both accurate and complete.         Attending ED Notes:   3:57 AM  Pt found to have esophageal foreign body. GI consulted and they will scope in the morning.          ED Course     Clinical Impression:   The primary encounter diagnosis was Esophageal foreign body, initial encounter. Diagnoses of SOB (shortness of breath), Vomiting, intractability of vomiting not specified, presence of nausea not specified, unspecified vomiting type, Epigastric abdominal pain, Food impaction of esophagus, initial encounter, and Esophageal obstruction due to food impaction were also pertinent to this visit.          Jimmy Oakley III, MD  05/31/17 0234

## 2017-05-30 NOTE — ANESTHESIA PREPROCEDURE EVALUATION
05/30/2017  Xochitl Weiss is a 56 y.o., female.    Anesthesia Evaluation    I have reviewed the Patient Summary Reports.    I have reviewed the Nursing Notes.   I have reviewed the Medications.     Review of Systems  Anesthesia Hx:  No problems with previous Anesthesia  History of prior surgery of interest to airway management or planning: Previous anesthesia: General Denies Family Hx of Anesthesia complications.   Denies Personal Hx of Anesthesia complications.   Social:  Non-Smoker    Hematology/Oncology:  Hematology Normal   Oncology Normal     EENT/Dental:EENT/Dental Normal   Cardiovascular:   Exercise tolerance: good hyperlipidemia    Pulmonary:  Pulmonary Normal    Renal/:   Chronic Renal Disease, renal hypertension Hx hydronephrosis   Hepatic/GI:   GERD Suspected esophageal foreign body   Musculoskeletal:  Musculoskeletal Normal    Neurological:  Neurology Normal    Endocrine:  Endocrine Normal    Dermatological:  Skin Normal    Psych:  Psychiatric Normal           Physical Exam  General:  Well nourished    Airway/Jaw/Neck:  Airway Findings: Mouth Opening: Small, but > 3cm Tongue: Normal  General Airway Assessment: Adult, Average  Mallampati: III  Improves to II with phonation.  TM Distance: 4 - 6 cm        Eyes/Ears/Nose:  EYES/EARS/NOSE FINDINGS: Normal   Dental:  Dental Findings: In tact   Chest/Lungs:  Chest/Lungs Findings: Clear to auscultation, Normal Respiratory Rate     Heart/Vascular:  Heart Findings: Rate: Normal  Rhythm: Regular Rhythm  Sounds: Normal  Heart murmur: negative Vascular Findings: Normal    Abdomen:  Abdomen Findings: Normal    Musculoskeletal:  Musculoskeletal Findings: Normal   Skin:  Skin Findings: Normal    Mental Status:  Mental Status Findings:  Cooperative, Alert and Oriented         Anesthesia Plan  Type of Anesthesia, risks & benefits discussed:  Anesthesia Type:   general  Patient's Preference:   Intra-op Monitoring Plan:   Intra-op Monitoring Plan Comments:   Post Op Pain Control Plan:   Post Op Pain Control Plan Comments:   Induction:   IV  Beta Blocker:  Patient is not currently on a Beta-Blocker (No further documentation required).       Informed Consent: Patient understands risks and agrees with Anesthesia plan.  Questions answered. Anesthesia consent signed with patient.  ASA Score: 2     Day of Surgery Review of History & Physical:            Ready For Surgery From Anesthesia Perspective.

## 2017-05-30 NOTE — ASSESSMENT & PLAN NOTE
CT imaging as above.  Is tolerating her secretions but with sensation of food sticking and some emesis.   Will plan urgent EGD now given concern for food impaction.    Plan:  EGD now  NPO now  Discussed with anesthesia

## 2017-05-30 NOTE — ANESTHESIA POSTPROCEDURE EVALUATION
"Anesthesia Post Evaluation    Patient: Xochitl Weiss    Procedure(s) Performed: Procedure(s) (LRB):  ESOPHAGOGASTRODUODENOSCOPY (EGD) (N/A)    Final Anesthesia Type: general  Patient location during evaluation: PACU  Patient participation: Yes- Able to Participate  Level of consciousness: awake and alert and oriented  Post-procedure vital signs: reviewed and stable  Pain management: adequate  Airway patency: patent  PONV status at discharge: No PONV  Anesthetic complications: no      Cardiovascular status: hemodynamically stable  Respiratory status: unassisted, spontaneous ventilation and room air  Hydration status: euvolemic  Follow-up not needed.        Visit Vitals  /67 (BP Location: Right arm, Patient Position: Sitting, BP Method: Automatic)   Pulse 75   Temp 36.5 °C (97.7 °F) (Temporal)   Resp 14   Ht 5' 4" (1.626 m)   Wt 59 kg (130 lb)   SpO2 100%   Breastfeeding? No   BMI 22.31 kg/m²       Pain/Laura Score: Pain Assessment Performed: Yes (5/30/2017  8:26 AM)  Presence of Pain: non-verbal indicators absent (5/30/2017  8:26 AM)  Laura Score: 6 (5/30/2017  8:26 AM)      "

## 2017-05-30 NOTE — TRANSFER OF CARE
"Anesthesia Transfer of Care Note    Patient: Xochitl Weiss    Procedure(s) Performed: Procedure(s) (LRB):  ESOPHAGOGASTRODUODENOSCOPY (EGD) (N/A)    Patient location: PACU    Anesthesia Type: general    Transport from OR: Transported from OR on room air with adequate spontaneous ventilation    Post pain: adequate analgesia    Post assessment: tolerated procedure well    Post vital signs: stable    Level of consciousness: awake and alert    Nausea/Vomiting: no nausea/vomiting    Complications: none    Transfer of care protocol was followed      Last vitals:   Visit Vitals  /82 (BP Location: Right arm, Patient Position: Lying, BP Method: Automatic)   Pulse 88   Temp 36.9 °C (98.5 °F) (Oral)   Resp 18   Ht 5' 4" (1.626 m)   Wt 59 kg (130 lb)   SpO2 98%   Breastfeeding? No   BMI 22.31 kg/m²     "

## 2017-05-30 NOTE — PROGRESS NOTES
GI Note    Called regarding patient Isela, 57yo old woman presenting with epigastric abdominal pain since this AM.  Patient reports eating lasagna [place] [employer] on Sunday night with no issues.  Patient also reports some intermittent vomiting, however is able to tolerate secretions.      A CT was obtained which showed a retained mixed density foreign body in the distal esophagus.      Plan:  EGD first case in AM   Keep NPO  Analgesia as required    Dinh Dee   Gastroenterology Fellow PGY V  [phone]

## 2017-05-30 NOTE — ANESTHESIA RELEASE NOTE
"Anesthesia Release from PACU Note    Patient: Xochitl Weiss    Procedure(s) Performed: Procedure(s) (LRB):  ESOPHAGOGASTRODUODENOSCOPY (EGD) (N/A)    Anesthesia type: general    Post pain: Adequate analgesia    Post assessment: no apparent anesthetic complications, tolerated procedure well and no evidence of recall    Last Vitals:   Visit Vitals  /67 (BP Location: Right arm, Patient Position: Sitting, BP Method: Automatic)   Pulse 75   Temp 36.5 °C (97.7 °F) (Temporal)   Resp 14   Ht 5' 4" (1.626 m)   Wt 59 kg (130 lb)   SpO2 100%   Breastfeeding? No   BMI 22.31 kg/m²       Post vital signs: stable    Level of consciousness: awake, alert  and oriented    Nausea/Vomiting: no nausea/no vomiting    Complications: none    Airway Patency: patent    Respiratory: unassisted, spontaneous ventilation, room air    Cardiovascular: stable and blood pressure at baseline    Hydration: euvolemic  "

## 2017-05-30 NOTE — PATIENT INSTRUCTIONS
Discharge Summary/Instructions for after EGD with Biopsy  Patient Name: Xochitl Weiss  Patient MRN: [id number]  Patient YOB: 1960  Tuesday, May 30, 2017  Geovanni Vaz MD  RESTRICTIONS ON ACTIVITY:  - DO NOT drive a car or operate machinery until the day after the   procedure.  - Following Day:  Return to full activities including work.  - Diet:  Eat and drink normally unless instructed otherwise.  TREATMENT FOR COMMON SIDE EFFECTS:  - Sore Throat - treat with throat lozenges, gargle with warm salt water.  - Mild abdominal pain & bloating - rest and take liquids only.  SYMPTOMS TO WATCH FOR AND REPORT TO YOUR PHYSICIAN:  1.  Chills or fever occurring within 24 hours after procedure.  2.  Pain in chest.  3.  SEVERE abdominal pain or bloating.  4.  Rectal bleeding which would show as maroon or black stools.  Your doctor recommends these additional instructions:  If any biopsies were performed, my office will call you in 5 to 6 business   days with any results.  You have a contact number available for emergencies.  The signs and symptoms   of potential delayed complications were discussed with you.  You may return   to normal activities tomorrow.  Written discharge instructions were   provided to you.   You are being discharged to home.   Advance your diet as tolerated.   Continue your present medications.   Take Protonix (pantoprazole) 40 mg by mouth once a day.   Your physician has recommended a repeat upper endoscopy in four weeks for   retreatment.   Return to your GI clinic at appointment to be scheduled.  If you have any questions or problems, please call your physician.  EMERGENCY PHONE NUMBER: [phone]  LAB RESULTS: [phone]         Geovanni Vaz MD  5/30/2017 8:15:55 AM  This report has been verified and signed electronically.  
5

## 2017-05-30 NOTE — PROVIDER PROGRESS NOTES - EMERGENCY DEPT.
Encounter Date: 5/29/2017    ED Physician Progress Notes         EKG - STEMI Decision  Initial Reading: No STEMI present.

## 2017-05-30 NOTE — ED TRIAGE NOTES
"Pt presents to the ED c c/o N/V/abd pain x12 hours. Pt states that the pain is in her epigastric region. Pt denies fever, but c/o chills. Pt states that when she woke up this morning that she was fine c some acid reflux.     Pt states that her last BM was today and was normal. Pt denies any blood in BM, but states that after vomiting for an extended amount of time that she began having a "pinkish" hue to her emesis.  "

## 2017-05-30 NOTE — CONSULTS
[facility]  Gastroenterology  Consult Note    Patient Name: Xochitl Weiss  MRN: [id number]  Admission Date: 2017  Hospital Length of Stay: 0 days  Code Status: No Order   Attending Provider: Vilma Oakley III, MD   Consulting Provider: Kelsea Land MD  Primary Care Physician: Lily Stoddard MD  Principal Problem:<principal problem not specified>    Inpatient consult to Gastroenterology  Consult performed by: KELSEA LAND  Consult ordered by: VILMA OAKLEY III        Subjective:     HPI:  No notes on file    Past Medical History:   Diagnosis Date    BRCA negative     Colon polyp     Diverticula of colon     Diverticulitis     Family history of breast cancer     mother    Family history of colon cancer     2 family members over age 60    General anesthetics causing adverse effect in therapeutic use     per patient report slow to awaken    GERD (gastroesophageal reflux disease)     Kidney stones     Mild hyperlipidemia        Past Surgical History:   Procedure Laterality Date    breast augmentation      BREAST LUMPECTOMY      below right breast     SECTION      x 2    COLONOSCOPY      COLONOSCOPY N/A 3/8/2017    Procedure: COLONOSCOPY;  Surgeon: Torey Macias MD;  Location: [address]);  Service: Endoscopy;  Laterality: N/A;    HYSTERECTOMY      OOPHORECTOMY      TRIGGER FINGER RELEASE Right 2017    thumb and 3rd digit    TUBAL LIGATION         Review of patient's allergies indicates:  No Known Allergies  Family History     Problem Relation (Age of Onset)    Breast cancer Mother (68), Maternal Aunt    Colon cancer Father, Mother (70)    Diabetes Mother    Hypertension Mother    Lung cancer Maternal Uncle    Ovarian cancer Maternal Aunt    Uterine cancer Mother        Social History Main Topics    Smoking status: Never Smoker    Smokeless tobacco: Never Used    Alcohol use 0.0 oz/week      Comment: socially    Drug use: No    Sexual  activity: Yes     Partners: Male     Birth control/ protection: None, Inserts     Review of Systems   Constitutional: Negative for chills, fatigue and fever.   HENT: Negative for mouth sores and nosebleeds.    Eyes: Negative for pain and redness.   Respiratory: Negative for cough and shortness of breath.    Cardiovascular: Negative for chest pain and palpitations.   Gastrointestinal: Positive for abdominal pain (mild epigatric tenderness). Negative for abdominal distention, blood in stool and constipation.   Genitourinary: Negative for dysuria and hematuria.   Musculoskeletal: Negative for arthralgias and joint swelling.   Skin: Negative for pallor and rash.   Neurological: Negative for seizures and facial asymmetry.   Psychiatric/Behavioral: Negative for agitation and confusion.     Objective:     Vital Signs (Most Recent):  Temp: 98.5 °F (36.9 °C) (05/30/17 0729)  Pulse: 88 (05/30/17 0729)  Resp: 18 (05/30/17 0729)  BP: 124/82 (05/30/17 0729)  SpO2: 98 % (05/30/17 0729) Vital Signs (24h Range):  Temp:  [98.5 °F (36.9 °C)-99.2 °F (37.3 °C)] 98.5 °F (36.9 °C)  Pulse:  [82-88] 88  Resp:  [16-18] 18  SpO2:  [98 %-100 %] 98 %  BP: (124-141)/(65-82) 124/82     Weight: 59 kg (130 lb) (05/29/17 2233)  Body mass index is 22.31 kg/m².    No intake or output data in the 24 hours ending 05/30/17 0733    Lines/Drains/Airways     Peripheral Intravenous Line                 Peripheral IV - Single Lumen 05/29/17 0871 Left Antecubital less than 1 day                Physical Exam   Constitutional: She is oriented to person, place, and time. She appears well-nourished. No distress.   HENT:   Head: Normocephalic and atraumatic.   Eyes: Conjunctivae are normal. No scleral icterus.   Neck: Neck supple.   Cardiovascular: Normal rate and regular rhythm.    Pulmonary/Chest: Breath sounds normal. No stridor. No respiratory distress.   Abdominal: Soft. She exhibits no distension. There is no tenderness. There is no rebound and no guarding.    Musculoskeletal: She exhibits no tenderness or deformity.   Neurological: She is alert and oriented to person, place, and time.   Skin: Skin is warm and dry. No rash noted.   Psychiatric: She has a normal mood and affect.   Vitals reviewed.      Significant Labs:  CBC:   Recent Labs  Lab 05/29/17  2253   WBC 11.85   HGB 14.3   HCT 41.9        BMP:   Recent Labs  Lab 05/29/17  2253   *      K 3.8      CO2 23   BUN 17   CREATININE 0.9   CALCIUM 10.1     CMP:   Recent Labs  Lab 05/29/17  2253   *   CALCIUM 10.1   ALBUMIN 4.1   PROT 7.9      K 3.8   CO2 23      BUN 17   CREATININE 0.9   ALKPHOS 83   ALT 12   AST 15   BILITOT 0.4     Coagulation:   Recent Labs  Lab 05/29/17  2328   INR 1.0       Significant Imaging:  Imaging results within the past 24 hours have been reviewed.     Reviewed CT findings : see HPI    Assessment/Plan:     Food impaction of esophagus    CT imaging as above.  Is tolerating her secretions but with sensation of food sticking and some emesis.   Will plan urgent EGD now given concern for food impaction.    Plan:  EGD now  NPO now  Discussed with anesthesia                Thank you for your consult. further recs pending EGD    Billy Land MD  Gastroenterology  [facility]

## 2017-06-01 ENCOUNTER — TELEPHONE (OUTPATIENT)
Dept: GASTROENTEROLOGY | Facility: CLINIC | Age: 57
End: 2017-06-01

## 2017-06-01 NOTE — TELEPHONE ENCOUNTER
----- Message from Maris Clemons sent at 6/1/2017 10:06 AM CDT -----  Contact: self - [phone]  kenya - is calling to schedule esophageal dilation - please call patient at

## 2017-06-21 ENCOUNTER — OFFICE VISIT (OUTPATIENT)
Dept: UROLOGY | Facility: CLINIC | Age: 57
End: 2017-06-21
Attending: UROLOGY
Payer: COMMERCIAL

## 2017-06-21 VITALS
BODY MASS INDEX: 22.85 KG/M2 | SYSTOLIC BLOOD PRESSURE: 127 MMHG | HEIGHT: 64 IN | DIASTOLIC BLOOD PRESSURE: 72 MMHG | HEART RATE: 75 BPM | WEIGHT: 133.81 LBS

## 2017-06-21 DIAGNOSIS — N39.0 RECURRENT UTI: ICD-10-CM

## 2017-06-21 DIAGNOSIS — N13.30 HYDRONEPHROSIS, UNSPECIFIED HYDRONEPHROSIS TYPE: Primary | ICD-10-CM

## 2017-06-21 DIAGNOSIS — R81 GLUCOSURIA: ICD-10-CM

## 2017-06-21 PROCEDURE — 99213 OFFICE O/P EST LOW 20 MIN: CPT | Mod: S$GLB,,, | Performed by: UROLOGY

## 2017-06-21 PROCEDURE — 99999 PR PBB SHADOW E&M-EST. PATIENT-LVL III: CPT | Mod: PBBFAC,,, | Performed by: UROLOGY

## 2017-06-21 RX ORDER — CLOBETASOL PROPIONATE 0.5 MG/G
CREAM TOPICAL
COMMUNITY
Start: 2017-06-07 | End: 2018-10-15

## 2017-06-21 NOTE — PROGRESS NOTES
"  Subjective:       Xochitl Weiss is a 56 y.o. female who is an established pt who was referred by Dr Stoddard for evaluation of hydronephrosis.      She recently had a CT scan for L flank pain that was thought diverticulitis. 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 She has known issues with L kidney for many years. She has been worked up for recurrent UTIs in the past. She denies L flank pain. She has had nephrolithiasis previous. Pain is not similar.      Mag3 renal scan - no obstruction, 41% L, 59% R.  Recent CT scan () showed normal kidneys.     She has known issues with occasional UTIs. Self-start Cipro given previously for travel. She is already on supplemental estrogen (Estring). UTIs started around time of first  ~20 years ago.     She now reports increase in urinary frequency.  She does note some straining and UMU. Occasional UUI and PHILIPP - not bothersome enough for intervention. Denies constipation. Nocturia x 0-1.     PVR (bladder scan) today - 79cc      The following portions of the patient's history were reviewed and updated as appropriate: allergies, current medications, past family history, past medical history, past social history, past surgical history and problem list.    Review of Systems  Constitutional: no fever or chills  ENT: no nasal congestion or sore throat  Respiratory: no cough or shortness of breath  Cardiovascular: no chest pain or palpitations  Gastrointestinal: no nausea or vomiting, tolerating diet  Genitourinary: as per HPI  Hematologic/Lymphatic: no easy bruising or lymphadenopathy  Musculoskeletal: no arthralgias or myalgias  Skin: no rashes or lesions  Neurological: no seizures or tremors  Behavioral/Psych: no auditory or visual hallucinations       Objective:    Vitals: /72 (BP Location: Right arm, Patient Position: Sitting, BP Method: Automatic)   Pulse 75   Ht 5' 4" (1.626 m)   Wt 60.7 kg (133 lb 13.1 oz)   " BMI 22.97 kg/m²     Physical Exam   General: well developed, well nourished in no acute distress  Head: normocephalic, atraumatic  Neck: supple, trachea midline, no obvious enlargement of thyroid  HEENT: EOMI, mucus membranes moist, sclera anicteric, no hearing impairment  Lungs: symmetric expansion, non-labored breathing  Cardiovascular: regular rate and rhythm, normal pulses  Abdomen: soft, non tender, non distended, no palpable masses, no hernias, no CVA tenderness  Musculoskeletal: no peripheral edema, normal ROM in bilateral upper and lower extremities  Lymphatics: no cervical or inguinal lymphadenopathy  Skin: no rashes or lesions  Neuro: alert and oriented x 3, no gross deficits  Psych: normal judgment and insight, normal mood/affect and non-anxious  Genitourinary:   patient declined exam      Lab Review   Urine analysis today in clinic shows - 1000 glucose, 5-10 RBCs    Lab Results   Component Value Date    WBC 11.85 05/29/2017    HGB 14.3 05/29/2017    HCT 41.9 05/29/2017    MCV 83 05/29/2017     05/29/2017     Lab Results   Component Value Date    CREATININE 0.9 05/29/2017    BUN 17 05/29/2017       Imaging  Images and reports were personally reviewed by me and discussed with patient  CT reviewed       Assessment/Plan:      1. Other hydronephrosis    - Partially duplicated L collecting system   - Prominent extra-renal renal pelvis   - Mag-3 renal scan - normal      2. Recurrent UTI    - Long standing issue   - Recent normal CT   - No need for further workup   - Estring in place     3. Glucosuria   - Recent normal A1c   - Defer to PCP      Follow up PRN

## 2017-06-28 ENCOUNTER — SURGERY (OUTPATIENT)
Age: 57
End: 2017-06-28

## 2017-06-28 ENCOUNTER — ANESTHESIA (OUTPATIENT)
Dept: ENDOSCOPY | Facility: HOSPITAL | Age: 57
End: 2017-06-28
Payer: COMMERCIAL

## 2017-06-28 ENCOUNTER — HOSPITAL ENCOUNTER (OUTPATIENT)
Facility: HOSPITAL | Age: 57
Discharge: HOME | End: 2017-06-28
Attending: INTERNAL MEDICINE | Admitting: INTERNAL MEDICINE
Payer: COMMERCIAL

## 2017-06-28 ENCOUNTER — ANESTHESIA EVENT (OUTPATIENT)
Dept: ENDOSCOPY | Facility: HOSPITAL | Age: 57
End: 2017-06-28
Payer: COMMERCIAL

## 2017-06-28 VITALS
SYSTOLIC BLOOD PRESSURE: 120 MMHG | BODY MASS INDEX: 22.53 KG/M2 | WEIGHT: 132 LBS | OXYGEN SATURATION: 99 % | RESPIRATION RATE: 20 BRPM | HEIGHT: 64 IN | HEART RATE: 70 BPM | TEMPERATURE: 98 F | DIASTOLIC BLOOD PRESSURE: 74 MMHG

## 2017-06-28 VITALS — RESPIRATION RATE: 27 BRPM

## 2017-06-28 DIAGNOSIS — R13.10 DYSPHAGIA: ICD-10-CM

## 2017-06-28 PROCEDURE — 25000003 PHARM REV CODE 250: Performed by: INTERNAL MEDICINE

## 2017-06-28 PROCEDURE — 43249 ESOPH EGD DILATION <30 MM: CPT | Mod: ,,, | Performed by: INTERNAL MEDICINE

## 2017-06-28 PROCEDURE — 43239 EGD BIOPSY SINGLE/MULTIPLE: CPT | Performed by: INTERNAL MEDICINE

## 2017-06-28 PROCEDURE — 43249 ESOPH EGD DILATION <30 MM: CPT | Performed by: INTERNAL MEDICINE

## 2017-06-28 PROCEDURE — 63600175 PHARM REV CODE 636 W HCPCS: Performed by: NURSE ANESTHETIST, CERTIFIED REGISTERED

## 2017-06-28 PROCEDURE — C1726 CATH, BAL DIL, NON-VASCULAR: HCPCS | Performed by: INTERNAL MEDICINE

## 2017-06-28 PROCEDURE — D9220A PRA ANESTHESIA: Mod: CRNA,,, | Performed by: NURSE ANESTHETIST, CERTIFIED REGISTERED

## 2017-06-28 PROCEDURE — 37000009 HC ANESTHESIA EA ADD 15 MINS: Performed by: INTERNAL MEDICINE

## 2017-06-28 PROCEDURE — 27201012 HC FORCEPS, HOT/COLD, DISP: Performed by: INTERNAL MEDICINE

## 2017-06-28 PROCEDURE — 88305 TISSUE EXAM BY PATHOLOGIST: CPT | Performed by: PATHOLOGY

## 2017-06-28 PROCEDURE — 37000008 HC ANESTHESIA 1ST 15 MINUTES: Performed by: INTERNAL MEDICINE

## 2017-06-28 PROCEDURE — 25000003 PHARM REV CODE 250: Performed by: NURSE ANESTHETIST, CERTIFIED REGISTERED

## 2017-06-28 PROCEDURE — D9220A PRA ANESTHESIA: Mod: ANES,,, | Performed by: ANESTHESIOLOGY

## 2017-06-28 PROCEDURE — 43239 EGD BIOPSY SINGLE/MULTIPLE: CPT | Mod: ,,, | Performed by: INTERNAL MEDICINE

## 2017-06-28 RX ORDER — SODIUM CHLORIDE 9 MG/ML
INJECTION, SOLUTION INTRAVENOUS CONTINUOUS PRN
Status: DISCONTINUED | OUTPATIENT
Start: 2017-06-28 | End: 2017-06-28

## 2017-06-28 RX ORDER — PROPOFOL 10 MG/ML
VIAL (ML) INTRAVENOUS
Status: DISCONTINUED | OUTPATIENT
Start: 2017-06-28 | End: 2017-06-28

## 2017-06-28 RX ORDER — SODIUM CHLORIDE 9 MG/ML
INJECTION, SOLUTION INTRAVENOUS CONTINUOUS
Status: DISCONTINUED | OUTPATIENT
Start: 2017-06-28 | End: 2017-06-28 | Stop reason: HOSPADM

## 2017-06-28 RX ORDER — LIDOCAINE HYDROCHLORIDE 10 MG/ML
INJECTION, SOLUTION INTRAVENOUS
Status: DISCONTINUED | OUTPATIENT
Start: 2017-06-28 | End: 2017-06-28

## 2017-06-28 RX ADMIN — SODIUM CHLORIDE: 0.9 INJECTION, SOLUTION INTRAVENOUS at 08:06

## 2017-06-28 RX ADMIN — PROPOFOL 80 MG: 10 INJECTION, EMULSION INTRAVENOUS at 09:06

## 2017-06-28 RX ADMIN — SODIUM CHLORIDE: 0.9 INJECTION, SOLUTION INTRAVENOUS at 09:06

## 2017-06-28 RX ADMIN — LIDOCAINE HYDROCHLORIDE 100 MG: 10 INJECTION, SOLUTION INTRAVENOUS at 09:06

## 2017-06-28 NOTE — ANESTHESIA POSTPROCEDURE EVALUATION
"Anesthesia Post Evaluation    Patient: Xochitl Weiss    Procedure(s) Performed: Procedure(s) (LRB):  ESOPHAGOGASTRODUODENOSCOPY (EGD) (N/A)    Final Anesthesia Type: general  Patient location during evaluation: PACU  Patient participation: Yes- Able to Participate  Level of consciousness: awake and alert  Post-procedure vital signs: reviewed and stable  Pain management: adequate  Airway patency: patent  PONV status at discharge: No PONV  Anesthetic complications: no      Cardiovascular status: hemodynamically stable and blood pressure returned to baseline  Respiratory status: unassisted and spontaneous ventilation  Hydration status: euvolemic  Follow-up not needed.        Visit Vitals  /74 (BP Location: Left arm, Patient Position: Lying, BP Method: Automatic)   Pulse 70   Temp 36.4 °C (97.6 °F) (Oral)   Resp 20   Ht 5' 4" (1.626 m)   Wt 59.9 kg (132 lb)   SpO2 99%   Breastfeeding? No   BMI 22.66 kg/m²       Pain/Laura Score: Pain Assessment Performed: Yes (6/28/2017  9:55 AM)  Presence of Pain: denies (6/28/2017  9:55 AM)  Laura Score: 10 (6/28/2017 10:08 AM)      "

## 2017-06-28 NOTE — H&P
[facility]  History & Physical    Subjective:      Chief Complaint/Reason for Admission:     EGD    Xochitl Weiss is a 56 y.o. female.    Past Medical History:   Diagnosis Date    BRCA negative     Colon polyp     Diverticula of colon     Diverticulitis     Family history of breast cancer     mother    Family history of colon cancer     2 family members over age 60    General anesthetics causing adverse effect in therapeutic use     per patient report slow to awaken    GERD (gastroesophageal reflux disease)     Kidney stones     Mild hyperlipidemia      Past Surgical History:   Procedure Laterality Date    breast augmentation      BREAST LUMPECTOMY      below right breast     SECTION      x 2    COLONOSCOPY      COLONOSCOPY N/A 3/8/2017    Procedure: COLONOSCOPY;  Surgeon: Torey Macias MD;  Location: [facility] ([address] [place] [address]);  Service: Endoscopy;  Laterality: N/A;    HYSTERECTOMY      OOPHORECTOMY      TRIGGER FINGER RELEASE Right 2017    thumb and 3rd digit    TUBAL LIGATION       Family History   Problem Relation Age of Onset    Colon cancer Father     Cancer Father      colon cancer    Breast cancer Mother 68    Colon cancer Mother 70     twice    Uterine cancer Mother     Diabetes Mother     Hypertension Mother     Cancer Mother 68     colon cancer    Breast cancer Maternal Aunt     Ovarian cancer Maternal Aunt     Lung cancer Maternal Uncle      Social History   Substance Use Topics    Smoking status: Never Smoker    Smokeless tobacco: Never Used    Alcohol use 0.0 oz/week      Comment: socially       PTA Medications   Medication Sig    clobetasol (TEMOVATE) 0.05 % cream     estradiol (ESTRING) 2 mg vaginal ring insert vaginally every 3 MONTHS    omeprazole (PRILOSEC) 20 MG capsule Take 2 capsules (40 mg total) by mouth once daily.    pantoprazole (PROTONIX) 40 MG tablet take 1 tablet by mouth by mouth every morning 45 MINUTES BEFORE  BREAKFAST (Patient taking differently: take 1 tablet by mouth by mouth every morning 45 MINUTES BEFORE BREAKFAST - patient taking as needed)     Review of patient's allergies indicates:  No Known Allergies     Review of Systems   Constitutional: Negative for chills, fever and weight loss.   Respiratory: Negative for shortness of breath and wheezing.    Cardiovascular: Negative for chest pain.   Gastrointestinal: Positive for heartburn. Negative for abdominal pain, blood in stool, constipation, diarrhea, melena, nausea and vomiting.       Objective:      Vital Signs (Most Recent)  Temp: 98 °F (36.7 °C) (06/28/17 0838)  Pulse: 84 (06/28/17 0838)  Resp: 14 (06/28/17 0838)  BP: 132/84 (06/28/17 0838)  SpO2: 97 % (06/28/17 0838)    Vital Signs Range (Last 24H):  Temp:  [98 °F (36.7 °C)]   Pulse:  [84]   Resp:  [14]   BP: (132)/(84)   SpO2:  [97 %]     Physical Exam   Constitutional: She is oriented to person, place, and time. She appears well-developed and well-nourished.   Cardiovascular: Normal rate.    Pulmonary/Chest: Effort normal and breath sounds normal.   Abdominal: Soft. Bowel sounds are normal.   Neurological: She is alert and oriented to person, place, and time.   Psychiatric: She has a normal mood and affect. Her behavior is normal. Judgment and thought content normal.         Assessment:      Active Hospital Problems    Diagnosis  POA    Dysphagia [R13.10]  Yes      Resolved Hospital Problems    Diagnosis Date Resolved POA   No resolved problems to display.       Plan:    EGD for dysphagia follow up esophageal food impaction.

## 2017-06-28 NOTE — ANESTHESIA PREPROCEDURE EVALUATION
06/28/2017  Xochitl Weiss is a 56 y.o., female.    Anesthesia Evaluation         Review of Systems  Anesthesia Hx:  No problems with previous Anesthesia Denies Hx of Anesthetic complications   Social:  Non-Smoker, Social Alcohol Use    Cardiovascular:   Exercise tolerance: good  Denies Angina. hyperlipidemia    Pulmonary:  Pulmonary Normal    Hepatic/GI:   GERD    Endocrine:  Endocrine Normal        Physical Exam  General:  Well nourished    Airway/Jaw/Neck:  Airway Findings: Mouth Opening: Normal Tongue: Normal  General Airway Assessment: Adult  Mallampati: II  TM Distance: Normal, at least 6 cm  Jaw/Neck Findings:     Neck ROM: Normal ROM      Dental:  Dental Findings: In tact   Chest/Lungs:  Chest/Lungs Findings: Clear to auscultation, Normal Respiratory Rate     Heart/Vascular:  Heart Findings: Rate: Normal  Rhythm: Regular Rhythm  Sounds: Normal        Mental Status:  Mental Status Findings:  Cooperative, Alert and Oriented         Anesthesia Plan  Type of Anesthesia, risks & benefits discussed:  Anesthesia Type:  general  Patient's Preference:   Intra-op Monitoring Plan: standard ASA monitors  Intra-op Monitoring Plan Comments:   Post Op Pain Control Plan:   Post Op Pain Control Plan Comments:   Induction:   IV  Beta Blocker:  Patient is not currently on a Beta-Blocker (No further documentation required).       Informed Consent: Patient understands risks and agrees with Anesthesia plan.  Questions answered. Anesthesia consent signed with patient.  ASA Score: 2     Day of Surgery Review of History & Physical:            Ready For Surgery From Anesthesia Perspective.

## 2017-06-28 NOTE — PATIENT INSTRUCTIONS
Discharge Summary/Instructions after an Endoscopic Procedure  Patient Name: Xochitl Weiss  Patient MRN: [id number]  Patient YOB: 1960 Wednesday, June 28, 2017  Torey Macias MD  RESTRICTIONS ON ACTIVITY:  - DO NOT drive a car, operate machinery, make legal/financial decisions, or   drink alcohol until the day after the procedure.    - The following day: return to full activity including work, except no heavy   lifting, straining or running for 3 days if polyps were removed.  - Diet: Eat and drink normally unless instructed otherwise.  TREATMENT FOR COMMON SIDE EFFECTS:  - Mild abdominal pain, bloating or excessive gas: rest, eat lightly and use   a heating pad.  - Sore Throat - treat with throat lozenges. Gargle with warm salt water.  SYMPTOMS TO WATCH FOR AND REPORT TO YOUR PHYSICIAN:  1. Severe abdominal pain or bloating.  2. Pain in chest.  3. Chills or fever occurring within 24 hours after a procedure.  4. A large amount of rectal bleeding, which would show as bright red,   maroon, or black stools. (A small amount of blood from the rectum is not   serious, especially if hemorrhoids are present.)  5. Because air was used during the procedure, expelling large amounts of air   from your rectum or belching is normal.  6. If a bowel prep was taken, you may not have a bowel movement for 1-3   days.  This is normal.  7. Go directly to the emergency room if you notice any of the following:   Chills and/or fever over 101 F   Persistent vomiting   Severe abdominal pain, other than gas cramps   Severe chest pain   Black, tarry stools   Any bleeding - exceeding one tablespoon  Your doctor recommends these additional instructions:  If any biopsies were performed, my office will call you in 5 to 6 business   days with any results.  You are being discharged to home.   Follow an antireflux regimen.  This includes:       - Do not lie down for at least 3 to 4 hours after meals.        - Raise the head of the bed 4 to  6 inches.        - Decrease excess weight.        - Avoid citrus juices and other acidic foods, alcohol, chocolate, mints,   coffee and other caffeinated beverages, carbonated beverages, fatty and   fried foods.        - Avoid tight-fitting clothing.        - Avoid cigarettes and other tobacco products.   We are waiting for your pathology results.   Telephone your endoscopist for pathology results in two weeks.   Return to your GI clinic at the next available appointment.   A proton pump inhibitor medication will be prescribed to be taken by mouth   once a day.   The findings and recommendations have been discussed with you.  For questions, problems or results please call your physician - Torey Macias MD at Work:  [phone].  [facility], EMERGENCY ROOM PHONE NUMBER: [phone]  IF A COMPLICATION OR EMERGENCY SITUATION ARISES AND YOU ARE UNABLE TO REACH   YOUR PHYSICIAN - GO TO THE EMERGENCY ROOM.  Torey Macias MD  6/28/2017 9:46:19 AM  This report has been verified and signed electronically.

## 2017-06-28 NOTE — TRANSFER OF CARE
"Anesthesia Transfer of Care Note    Patient: Xochitl Weiss    Procedure(s) Performed: Procedure(s) (LRB):  ESOPHAGOGASTRODUODENOSCOPY (EGD) (N/A)    Patient location: PACU    Anesthesia Type: general    Transport from OR: Transported from OR on room air with adequate spontaneous ventilation    Post pain: adequate analgesia    Post assessment: no apparent anesthetic complications and tolerated procedure well    Post vital signs: stable    Level of consciousness: awake and alert    Nausea/Vomiting: no nausea/vomiting    Complications: none    Transfer of care protocol was followed      Last vitals:   Visit Vitals  /64 (BP Location: Left arm, Patient Position: Lying, BP Method: Automatic)   Pulse 65   Temp 36.7 °C (98 °F) (Oral)   Resp 18   Ht 5' 4" (1.626 m)   Wt 59.9 kg (132 lb)   SpO2 96%   Breastfeeding? No   BMI 22.66 kg/m²     "

## 2017-06-28 NOTE — DISCHARGE INSTRUCTIONS

## 2017-06-29 ENCOUNTER — TELEPHONE (OUTPATIENT)
Dept: GASTROENTEROLOGY | Facility: CLINIC | Age: 57
End: 2017-06-29

## 2017-06-29 NOTE — TELEPHONE ENCOUNTER
----- Message from Sakina Curiel sent at 6/29/2017  9:23 AM CDT -----  Contact: self  Pt had a procedure on 06/28 said the dr told her to schedule a 4 month f/u ,please call pt to get that appointment schedule

## 2017-07-05 ENCOUNTER — TELEPHONE (OUTPATIENT)
Dept: ENDOSCOPY | Facility: HOSPITAL | Age: 57
End: 2017-07-05

## 2017-07-06 ENCOUNTER — TELEPHONE (OUTPATIENT)
Dept: GASTROENTEROLOGY | Facility: CLINIC | Age: 57
End: 2017-07-06

## 2017-07-06 NOTE — TELEPHONE ENCOUNTER
----- Message from Torey Macias MD sent at 7/5/2017  6:50 PM CDT -----  Harriett - please tell Xochitl that her EGD pathology was benign.    SPECIMEN  1) Distal and proximal esophagus. Rule out EOE.  FINAL PATHOLOGIC DIAGNOSIS  DISTAL AND PROXIMAL ESOPHAGUS, BIOPSY:  Benign squamous epithelium with mild chronic inflammation and reactive changes  No evidence of intestinal metaplasia, dysplasia or malignancy  No evidence of significant eosinophils present  Diagnosed by: Ann Schuler M.D.

## 2017-07-26 DIAGNOSIS — K21.9 GERD (GASTROESOPHAGEAL REFLUX DISEASE): ICD-10-CM

## 2017-07-26 RX ORDER — PANTOPRAZOLE SODIUM 40 MG/1
TABLET, DELAYED RELEASE ORAL
Qty: 90 TABLET | Refills: 3 | Status: SHIPPED | OUTPATIENT
Start: 2017-07-26 | End: 2017-11-14 | Stop reason: DRUGHIGH

## 2017-08-21 ENCOUNTER — TELEPHONE (OUTPATIENT)
Dept: GASTROENTEROLOGY | Facility: CLINIC | Age: 57
End: 2017-08-21

## 2017-08-21 NOTE — TELEPHONE ENCOUNTER
----- Message from Sima Carlos sent at 8/21/2017  3:37 PM CDT -----  Contact: Self- [phone]  Colleen- pt called to schedule a f/u appt with Dr. Macias- needs to be in November- please call pt back at [phone]

## 2017-10-16 ENCOUNTER — OFFICE VISIT (OUTPATIENT)
Dept: PODIATRY | Facility: CLINIC | Age: 57
End: 2017-10-16
Payer: COMMERCIAL

## 2017-10-16 ENCOUNTER — HOSPITAL ENCOUNTER (OUTPATIENT)
Dept: RADIOLOGY | Facility: HOSPITAL | Age: 57
Discharge: HOME | End: 2017-10-16
Attending: PODIATRIST
Payer: COMMERCIAL

## 2017-10-16 VITALS
TEMPERATURE: 98 F | DIASTOLIC BLOOD PRESSURE: 74 MMHG | BODY MASS INDEX: 22.53 KG/M2 | WEIGHT: 132 LBS | SYSTOLIC BLOOD PRESSURE: 109 MMHG | HEIGHT: 64 IN | HEART RATE: 71 BPM

## 2017-10-16 DIAGNOSIS — M79.672 LEFT FOOT PAIN: ICD-10-CM

## 2017-10-16 DIAGNOSIS — M79.89 FOOT SWELLING: ICD-10-CM

## 2017-10-16 DIAGNOSIS — M79.672 LEFT FOOT PAIN: Primary | ICD-10-CM

## 2017-10-16 PROCEDURE — 99999 PR PBB SHADOW E&M-EST. PATIENT-LVL III: CPT | Mod: PBBFAC,,, | Performed by: PODIATRIST

## 2017-10-16 PROCEDURE — 99214 OFFICE O/P EST MOD 30 MIN: CPT | Mod: S$GLB,,, | Performed by: PODIATRIST

## 2017-10-16 PROCEDURE — 73630 X-RAY EXAM OF FOOT: CPT | Mod: TC,LT

## 2017-10-16 PROCEDURE — 73630 X-RAY EXAM OF FOOT: CPT | Mod: 26,LT,, | Performed by: RADIOLOGY

## 2017-10-16 NOTE — PROGRESS NOTES
"CC:   left foot pain    HPI:   Xochitl Weiss is a 56 y.o. female with complaints of  left foot pain and swelling mainly lateral aspect.  Patient relates pain has been present since Saturday after walking a lot in the grass, uneven surface.  Not sure if she had a bug bite in the area.  Pain woke up her one night and she took a benadryl to help with pain.  She can't put weight on her foot so she has been using crutches to offload the area.           Past Medical History:   Diagnosis Date    BRCA negative     Colon polyp     Diverticula of colon     Diverticulitis     Family history of breast cancer     mother    Family history of colon cancer     2 family members over age 60    General anesthetics causing adverse effect in therapeutic use     per patient report slow to awaken    GERD (gastroesophageal reflux disease)     Kidney stones     Mild hyperlipidemia          Current Outpatient Prescriptions on File Prior to Visit   Medication Sig Dispense Refill    clobetasol (TEMOVATE) 0.05 % cream       estradiol (ESTRING) 2 mg vaginal ring insert vaginally every 3 MONTHS 1 each 3    omeprazole (PRILOSEC) 20 MG capsule Take 2 capsules (40 mg total) by mouth once daily. 60 capsule 0    pantoprazole (PROTONIX) 40 MG tablet take 1 tablet by mouth every morning 45 MINUTES BEFORE BREAKFAST 90 tablet 3     No current facility-administered medications on file prior to visit.            Review of patient's allergies indicates:  No Known Allergies        ROS:   General ROS: negative for - chills, fever or night sweats  Respiratory ROS: no cough, shortness of breath, or wheezing  Cardiovascular ROS: no chest pain or dyspnea on exertion  Musculoskeletal ROS: negative  Neurological ROS: no TIA or stroke symptoms  Dermatological ROS: negative      EXAM:     Vitals:    10/16/17 1017   BP: 109/74   Pulse: 71   Temp: 98 °F (36.7 °C)   TempSrc: Oral   Weight: 59.9 kg (132 lb)   Height: 5' 4" (1.626 m)        General:  " Alert, oriented, no acute distress      Left  Lower extremity exam:    Vascular:   Dorsalis Pedis:  present   Posterior Tibial:  present  capillary refill time:  3 seconds  Temperature of toes warm to touch  Hair on toes:  present    1+ Edema to affected area.  At the 5th metatarsal base      Neurological:     sharp dull - L normal and light touch - L normal  No sensorimotor deficits      Dermatological:   There is intact  skin tone, turgor, and temperature.    Wounds: none  Erythema:  Mild to the lateral 5th metatarsal base area.         Musculoskeletal:   Metatarsophalangeal, subtalar, and ankle range of motion are 5/5, adequate ROM, adequate strength  Right foot: not examined  Left foot: +redness, swelling and tenderness to palpation at the styloid process            Imaging:  10/16/17 Presented for review: 3 images  AP, oblique and lateral views of the LEFT foot.    Comparison: None    Findings:  There is hallux valgus with bunion formation and mild osteoarthrosis of the first MTP.  Spurring is evident about the head of the first metatarsal.  There is mild osteoarthrosis proximal and distal interphalangeal joints of the second through fifth toes.  Small spurs are also present at the inferior and posterior margins of the calcaneus.  I detect no fracture, dislocation, radiopaque retained foreign body, lytic or blastic lesion.    Lisfranc articulation is congruent.  Talar dome is maintained.          ASSESSMENT/PLAN:        I counseled the patient on the patient's conditions, their implications and medical management.        Left foot pain  -     X-Ray Foot Complete Left; Future; Expected date: 10/16/2017  -     Uric acid; Future; Expected date: 10/16/2017  -     CBC auto differential; Future; Expected date: 10/16/2017  -     MRI Lower Extremity Without Contrast Left; Future; Expected date: 10/16/2017    Foot swelling - Left Foot  -     Uric acid; Future; Expected date: 10/16/2017  -     CBC auto differential;  Future; Expected date: 10/16/2017  -     MRI Lower Extremity Without Contrast Left; Future; Expected date: 10/16/2017      RICE!!  CAM boot  MRI  follow up in a week.

## 2017-10-18 ENCOUNTER — PATIENT MESSAGE (OUTPATIENT)
Dept: PODIATRY | Facility: CLINIC | Age: 57
End: 2017-10-18

## 2017-10-24 ENCOUNTER — OFFICE VISIT (OUTPATIENT)
Dept: PODIATRY | Facility: CLINIC | Age: 57
End: 2017-10-24
Payer: COMMERCIAL

## 2017-10-24 VITALS — BODY MASS INDEX: 22.53 KG/M2 | HEIGHT: 64 IN | WEIGHT: 132 LBS

## 2017-10-24 DIAGNOSIS — M79.672 LEFT FOOT PAIN: Primary | ICD-10-CM

## 2017-10-24 DIAGNOSIS — M79.89 FOOT SWELLING: ICD-10-CM

## 2017-10-24 PROCEDURE — 99213 OFFICE O/P EST LOW 20 MIN: CPT | Mod: S$GLB,,, | Performed by: PODIATRIST

## 2017-10-24 PROCEDURE — 99999 PR PBB SHADOW E&M-EST. PATIENT-LVL II: CPT | Mod: PBBFAC,,, | Performed by: PODIATRIST

## 2017-10-24 NOTE — PROGRESS NOTES
CC:   left foot pain    HPI:   Xochitl Weiss is a 56 y.o. female with complaints of  left foot pain and swelling mainly lateral aspect.  Patient relates pain has been present since Saturday after walking a lot in the grass, uneven surface.  Not sure if she had a bug bite in the area.  Pain woke up her one night and she took a benadryl to help with pain.  She can't put weight on her foot so she has been using crutches to offload the area.     10/24/17:    Wore boot for 3 days.  significant improvement in pain.  There is minimal tenderness today.  She is able to wear heels without issues.     Uric acid was only 6.1.   CBC was completely normal.   We decided not to get the MRI as she was healing well.           Past Medical History:   Diagnosis Date    BRCA negative     Colon polyp     Diverticula of colon     Diverticulitis     Family history of breast cancer     mother    Family history of colon cancer     2 family members over age 60    General anesthetics causing adverse effect in therapeutic use     per patient report slow to awaken    GERD (gastroesophageal reflux disease)     Kidney stones     Mild hyperlipidemia          Current Outpatient Prescriptions on File Prior to Visit   Medication Sig Dispense Refill    clobetasol (TEMOVATE) 0.05 % cream       estradiol (ESTRING) 2 mg vaginal ring insert vaginally every 3 MONTHS 1 each 3    omeprazole (PRILOSEC) 20 MG capsule Take 2 capsules (40 mg total) by mouth once daily. 60 capsule 0    pantoprazole (PROTONIX) 40 MG tablet take 1 tablet by mouth every morning 45 MINUTES BEFORE BREAKFAST 90 tablet 3     No current facility-administered medications on file prior to visit.            Review of patient's allergies indicates:  No Known Allergies        ROS:   General ROS: negative for - chills, fever or night sweats  Respiratory ROS: no cough, shortness of breath, or wheezing  Cardiovascular ROS: no chest pain or dyspnea on exertion  Musculoskeletal  "ROS: negative  Neurological ROS: no TIA or stroke symptoms  Dermatological ROS: negative      EXAM:     Vitals:    10/24/17 1108   Weight: 59.9 kg (132 lb)   Height: 5' 4" (1.626 m)        General:  Alert, oriented, no acute distress      Left  Lower extremity exam:    Vascular:   Dorsalis Pedis:  present   Posterior Tibial:  present  capillary refill time:  3 seconds  Temperature of toes warm to touch  Hair on toes:  present    No  Edema to affected area.  At the 5th metatarsal base      Neurological:     sharp dull - L normal and light touch - L normal  No sensorimotor deficits      Dermatological:   There is intact  skin tone, turgor, and temperature.    Wounds: none  Erythema:  Minimal to the lateral 5th metatarsal base area.  No cellulitis        Musculoskeletal:   Metatarsophalangeal, subtalar, and ankle range of motion are 5/5, adequate ROM, adequate strength  Right foot: not examined  Left foot:  NO swelling and NO tenderness to palpation at the styloid process;  No deformity            Imaging:  10/16/17 Presented for review: 3 images  AP, oblique and lateral views of the LEFT foot.    Comparison: None    Findings:  There is hallux valgus with bunion formation and mild osteoarthrosis of the first MTP.  Spurring is evident about the head of the first metatarsal.  There is mild osteoarthrosis proximal and distal interphalangeal joints of the second through fifth toes.  Small spurs are also present at the inferior and posterior margins of the calcaneus.  I detect no fracture, dislocation, radiopaque retained foreign body, lytic or blastic lesion.    Lisfranc articulation is congruent.  Talar dome is maintained.          ASSESSMENT/PLAN:        I counseled the patient on the patient's conditions, their implications and medical management.        Left foot pain    Foot swelling - Left Foot        Signs and symptoms resolved.  No new issues.  Continue to monitor.  Supportive shoes.  Never walk barefoot. Check " feet often.     Call or return to clinic prn if these symptoms worsen or fail to improve as anticipated.  Patient is amenable to plan.

## 2017-11-14 ENCOUNTER — LAB VISIT (OUTPATIENT)
Dept: LAB | Facility: HOSPITAL | Age: 57
End: 2017-11-14
Attending: INTERNAL MEDICINE
Payer: COMMERCIAL

## 2017-11-14 ENCOUNTER — OFFICE VISIT (OUTPATIENT)
Dept: GASTROENTEROLOGY | Facility: CLINIC | Age: 57
End: 2017-11-14
Payer: COMMERCIAL

## 2017-11-14 VITALS
HEIGHT: 64 IN | HEART RATE: 75 BPM | DIASTOLIC BLOOD PRESSURE: 82 MMHG | BODY MASS INDEX: 24.35 KG/M2 | WEIGHT: 142.63 LBS | SYSTOLIC BLOOD PRESSURE: 124 MMHG

## 2017-11-14 DIAGNOSIS — K21.9 GASTROESOPHAGEAL REFLUX DISEASE, ESOPHAGITIS PRESENCE NOT SPECIFIED: Primary | ICD-10-CM

## 2017-11-14 DIAGNOSIS — Z79.899 ENCOUNTER FOR MONITORING LONG-TERM PROTON PUMP INHIBITOR THERAPY: ICD-10-CM

## 2017-11-14 DIAGNOSIS — K21.9 GASTROESOPHAGEAL REFLUX DISEASE, ESOPHAGITIS PRESENCE NOT SPECIFIED: ICD-10-CM

## 2017-11-14 DIAGNOSIS — Z51.81 ENCOUNTER FOR MONITORING LONG-TERM PROTON PUMP INHIBITOR THERAPY: ICD-10-CM

## 2017-11-14 LAB
25(OH)D3+25(OH)D2 SERPL-MCNC: 44 NG/ML
ALBUMIN SERPL BCP-MCNC: 3.7 G/DL
ALP SERPL-CCNC: 83 U/L
ALT SERPL W/O P-5'-P-CCNC: 18 U/L
ANION GAP SERPL CALC-SCNC: 6 MMOL/L
AST SERPL-CCNC: 25 U/L
BILIRUB SERPL-MCNC: 0.5 MG/DL
BUN SERPL-MCNC: 14 MG/DL
CALCIUM SERPL-MCNC: 9.7 MG/DL
CHLORIDE SERPL-SCNC: 104 MMOL/L
CO2 SERPL-SCNC: 31 MMOL/L
CREAT SERPL-MCNC: 0.8 MG/DL
EST. GFR  (AFRICAN AMERICAN): >60 ML/MIN/1.73 M^2
EST. GFR  (NON AFRICAN AMERICAN): >60 ML/MIN/1.73 M^2
GLUCOSE SERPL-MCNC: 91 MG/DL
MAGNESIUM SERPL-MCNC: 1.9 MG/DL
POTASSIUM SERPL-SCNC: 4.6 MMOL/L
PROT SERPL-MCNC: 7.1 G/DL
SODIUM SERPL-SCNC: 141 MMOL/L
VIT B12 SERPL-MCNC: 295 PG/ML

## 2017-11-14 PROCEDURE — 80053 COMPREHEN METABOLIC PANEL: CPT

## 2017-11-14 PROCEDURE — 83735 ASSAY OF MAGNESIUM: CPT

## 2017-11-14 PROCEDURE — 99213 OFFICE O/P EST LOW 20 MIN: CPT | Mod: S$GLB,,, | Performed by: INTERNAL MEDICINE

## 2017-11-14 PROCEDURE — 36415 COLL VENOUS BLD VENIPUNCTURE: CPT

## 2017-11-14 PROCEDURE — 82607 VITAMIN B-12: CPT

## 2017-11-14 PROCEDURE — 99999 PR PBB SHADOW E&M-EST. PATIENT-LVL III: CPT | Mod: PBBFAC,,, | Performed by: INTERNAL MEDICINE

## 2017-11-14 PROCEDURE — 82306 VITAMIN D 25 HYDROXY: CPT

## 2017-11-14 RX ORDER — PANTOPRAZOLE SODIUM 20 MG/1
20 TABLET, DELAYED RELEASE ORAL
Qty: 90 TABLET | Refills: 3 | Status: SHIPPED | OUTPATIENT
Start: 2017-11-14 | End: 2021-07-09

## 2017-11-14 NOTE — PROGRESS NOTES
CHIEF COMPLAINT:  Followup of heartburn.     HISTORY OF PRESENT ILLNESS:  This is a 57-year-old white female who is well   known to me with past medical history of prior intermittent food impaction.  She had   an EGD for dysphagia.  She had a repeat EGD on double dose PPI.  It showed no   evidence of any eosinophilic esophagitis.  She knows that long-term PPI use may   increase the risk of pneumonias acutely, possibly C. difficile infections, low   vitamin B12, low vitamin D, low magnesium and osteoporosis and fractures.  She   knows she ought to get a vitamin B12, vitamin D and magnesium once yearly and   periodic bone density Recent EGD with esophageal dilation to 18mm has improved  Symptoms.    REVIEW OF SYSTEMS:  CONSTITUTIONAL:  No fever, fatigue or weight loss.  EYES:  No visual disturbances.  ENT:  No difficulty swallowing or sore throat.  CARDIOVASCULAR:  No chest pain or palpitations.  RESPIRATORY:  No shortness of breath or cough.  GENITOURINARY:  No dysuria, urgency or frequency.  MUSCULOSKELETAL:  No arthritis.  SKIN:  No itching or rash.  GASTROINTESTINAL:  No nausea or vomiting.  Heartburn well controlled.  No   abdominal pain.  No dysphagia, no odynophagia, no change in appetite, no   diarrhea, no constipation, no blood in her stool, averaging one bowel movement a   day.     PAST MEDICAL HISTORY:  She has had renal stones, colon polyps and one episode of   dysphagia, but no evidence of eosinophilic esophagitis.     PAST SURGICAL HISTORY:  Bilateral salpingo-oophorectomy and hysterectomy.     FAMILY HISTORY:  Father with colon cancer at 68.  Mother with colon cancer at   70.  Mother with uterine cancer at 30, mother with breast cancer at 68.    Mother's sister with ovarian cancer at 60, mother's brother with lung cancer   around 60 years of age.  The patient is one of six children.     SOCIAL HISTORY:  She is  on her first marriage.  She has two healthy   kids, age about 28 and 25.  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  They have been  for about 33 years.  She is a   nonsmoker, nondrinker.  She worked for [employer].       PHYSICAL EXAMINATION:  /82   Pulse 75   Ht 5' 4" (1.626 m)   Wt 64.7 kg (142 lb 10.2 oz)   BMI 24.48 kg/m²   GENERAL APPEARANCE:  She is well nourished, well developed, not in any acute   distress.  ABDOMEN:  Soft, no guarding, no rebound, no tenderness, no palpable   organomegaly, no bruits, no pulsatile masses, no stigmata of chronic liver   disease, no appreciative ascites or hernias.  Normoactive bowel sounds.  EYES:  Conjunctivae are nonicteric.  CARDIOVASCULAR:  S1 and S2 without murmurs, gallops or rubs.  RESPIRATORY:  Clear to auscultation bilaterally without wheezes, rhonchi or   rales.  SKIN:  No petechiae or rash on exposed skin areas.  NEUROLOGIC:  Alert and oriented x4.  PSYCHIATRIC:  Normal speech, mentation and affect.     MEDICAL DECISION MAKING:  As above.  PPI talk given.  Colonoscopy talk given.     IMPRESSION AND PLAN:  1.  Gastroesophageal reflux disease, on long-term proton pump inhibitor.    Recommend yearly vitamin B12, vitamin D and magnesium, periodic bone density.    Return to GI Clinic in one year.  Continue pantoprazole 20 mg once daily.  2.  Family history of colon cancer and a personal history of colon polyps.  Last   colonoscopy was done in 01/2012.  Recommend next one in five years from her last one.   3. Return to GI Clinic once yearly.        "

## 2017-11-28 ENCOUNTER — TELEPHONE (OUTPATIENT)
Dept: PODIATRY | Facility: CLINIC | Age: 57
End: 2017-11-28

## 2017-11-28 NOTE — TELEPHONE ENCOUNTER
Per pt  reinjured her foot  And would like to see if you can order  An mri from previous fall she just had to   Left foot  And is wearing boot it swollen and pt is afraid may be a tear  And is wearing boot pt would like to  Get mri done before thw end of the year thank you please advise

## 2017-12-04 ENCOUNTER — HOSPITAL ENCOUNTER (OUTPATIENT)
Dept: RADIOLOGY | Facility: HOSPITAL | Age: 57
Discharge: HOME | End: 2017-12-04
Attending: PODIATRIST
Payer: COMMERCIAL

## 2017-12-04 DIAGNOSIS — M79.672 LEFT FOOT PAIN: ICD-10-CM

## 2017-12-04 DIAGNOSIS — M79.89 FOOT SWELLING: ICD-10-CM

## 2017-12-04 PROCEDURE — 73718 MRI LOWER EXTREMITY W/O DYE: CPT | Mod: 26,LT,, | Performed by: RADIOLOGY

## 2017-12-04 PROCEDURE — 73718 MRI LOWER EXTREMITY W/O DYE: CPT | Mod: TC,LT

## 2017-12-05 ENCOUNTER — TELEPHONE (OUTPATIENT)
Dept: PODIATRY | Facility: CLINIC | Age: 57
End: 2017-12-05

## 2018-01-03 ENCOUNTER — TELEPHONE (OUTPATIENT)
Dept: INTERNAL MEDICINE | Facility: CLINIC | Age: 58
End: 2018-01-03

## 2018-01-03 NOTE — TELEPHONE ENCOUNTER
Spoke with pt. She is wanting clearance for a 1 hr plastic surgery for the eyes. Her last lab work was done by Dr. Warner  On 11/14/17. She is requesting your clearance and I will put the faxed paperwork on your desk

## 2018-01-03 NOTE — TELEPHONE ENCOUNTER
----- Message from Siobhan Nguyen sent at 1/3/2018  9:39 AM CST -----  Contact: pt  _  1st Request  _  2nd Request  _  3rd Request        Who: pt    Why: Requesting a call back in regards to pt wants to follow up on fax that was sent on today. Please call pt     What Number to Call Back:[phone]    When to Expect a call back: (Within 24 hours)    Please return the call at earliest convenience. Thanks!

## 2018-01-05 ENCOUNTER — OFFICE VISIT (OUTPATIENT)
Dept: INTERNAL MEDICINE | Facility: CLINIC | Age: 58
End: 2018-01-05
Attending: INTERNAL MEDICINE
Payer: COMMERCIAL

## 2018-01-05 VITALS
BODY MASS INDEX: 23.26 KG/M2 | WEIGHT: 136.25 LBS | OXYGEN SATURATION: 97 % | HEART RATE: 87 BPM | SYSTOLIC BLOOD PRESSURE: 100 MMHG | HEIGHT: 64 IN | DIASTOLIC BLOOD PRESSURE: 78 MMHG

## 2018-01-05 DIAGNOSIS — Z01.818 PRE-OPERATIVE CLEARANCE: Primary | ICD-10-CM

## 2018-01-05 PROCEDURE — 99999 PR PBB SHADOW E&M-EST. PATIENT-LVL III: CPT | Mod: PBBFAC,,, | Performed by: INTERNAL MEDICINE

## 2018-01-05 PROCEDURE — 99214 OFFICE O/P EST MOD 30 MIN: CPT | Mod: S$GLB,,, | Performed by: INTERNAL MEDICINE

## 2018-01-05 NOTE — PROGRESS NOTES
"Subjective:   Patient ID: Xochitl Weiss is a 57 y.o. female  Chief complaint:   Chief Complaint   Patient presents with    Pre-op Exam       HPI    Pt here for pre op clearance for cosmetic bilateral upper and lower blepharoplasty jan 11, 2018  Pt has hx of GERD on protonix 20mg and followed by gi. Doing well as sx controlled with this.     Patient has no personal or family history of hypercoagulable disorders, pulmonary embolisms, DVT, or bleeding disorders. No family history of sudden death. No personal or family history of difficulty with anesthesia. Patient is able to climb a flight of stairs without difficulty breathing. No chest pain, orthopnea, palpitations, dyspnea on exertion.     Exercising reg - pilates and yoga, able to clean house without issues.     Review of Systems   Constitutional: Negative for chills and fever.   HENT: Negative for rhinorrhea and sore throat.    Eyes: Negative for pain and visual disturbance.   Respiratory: Negative for cough and shortness of breath.    Cardiovascular: Negative for chest pain and leg swelling.   Gastrointestinal: Negative for abdominal pain, constipation and diarrhea.   Genitourinary: Negative for dysuria and hematuria.   Musculoskeletal: Negative for arthralgias and joint swelling.   Skin: Negative for color change and rash.   Neurological: Negative for dizziness and headaches.   Psychiatric/Behavioral: Negative for sleep disturbance. The patient is not nervous/anxious.        Objective:  Vitals:    01/05/18 0806   BP: 100/78   Pulse: 87   SpO2: 97%   Weight: 61.8 kg (136 lb 3.9 oz)   Height: 5' 4" (1.626 m)     Body mass index is 23.39 kg/m².    Physical Exam   Constitutional: She is oriented to person, place, and time. She appears well-developed and well-nourished.   HENT:   Head: Normocephalic and atraumatic.   Right Ear: External ear normal.   Left Ear: External ear normal.   Nose: Nose normal.   Mouth/Throat: Oropharynx is clear and moist. No " oropharyngeal exudate.   Eyes: Conjunctivae and EOM are normal.   Neck: Neck supple. No thyromegaly present.   Cardiovascular: Normal rate, regular rhythm, normal heart sounds and intact distal pulses.    Pulmonary/Chest: Effort normal and breath sounds normal.   Abdominal: Soft. Bowel sounds are normal.   Musculoskeletal: She exhibits no edema or tenderness.   Lymphadenopathy:     She has no cervical adenopathy.   Neurological: She is alert and oriented to person, place, and time.   Skin: Skin is warm and dry. Capillary refill takes less than 2 seconds.   Psychiatric: Her behavior is normal. Thought content normal.   Vitals reviewed.    Assessment:  1. Pre-operative clearance        Plan:  Xochitl was seen today for pre-op exam.    Diagnoses and all orders for this visit:    Pre-operative clearance  Pt is low risk for low risk procedure. moctezuma score 0.02%  Reviewed labs from 11/17, cxr and ekg from 2017 - able to proceed from my standpoint. Form completed and copy provided to pt by staff. Office to fax paperwork to number provided on form    Health Maintenance   Topic Date Due    Mammogram  03/09/2018    Lipid Panel  04/05/2021    Colonoscopy  03/08/2022    TETANUS VACCINE  09/17/2025    Hepatitis C Screening  Completed    Influenza Vaccine  Completed

## 2018-02-28 ENCOUNTER — OFFICE VISIT (OUTPATIENT)
Dept: OBSTETRICS AND GYNECOLOGY | Facility: CLINIC | Age: 58
End: 2018-02-28
Attending: OBSTETRICS & GYNECOLOGY
Payer: COMMERCIAL

## 2018-02-28 VITALS
HEIGHT: 64 IN | SYSTOLIC BLOOD PRESSURE: 110 MMHG | WEIGHT: 136.25 LBS | DIASTOLIC BLOOD PRESSURE: 70 MMHG | BODY MASS INDEX: 23.26 KG/M2

## 2018-02-28 DIAGNOSIS — N95.2 VAGINAL ATROPHY: ICD-10-CM

## 2018-02-28 DIAGNOSIS — Z01.419 WELL WOMAN EXAM: Primary | ICD-10-CM

## 2018-02-28 PROCEDURE — 99396 PREV VISIT EST AGE 40-64: CPT | Mod: S$GLB,,, | Performed by: OBSTETRICS & GYNECOLOGY

## 2018-02-28 PROCEDURE — 99999 PR PBB SHADOW E&M-EST. PATIENT-LVL III: CPT | Mod: PBBFAC,,, | Performed by: OBSTETRICS & GYNECOLOGY

## 2018-02-28 NOTE — PROGRESS NOTES
CC: Well woman exam    Xochitl Weiss is a 57 y.o. female  status post hyst/bso presents for a well woman exam.  No current issues, problems, or complaints.   Has been using vaginal estrogen since her hysterectomy and doing well on it.       Past Medical History:   Diagnosis Date    BRCA negative     Colon polyp     Diverticula of colon     Diverticulitis     Family history of breast cancer     mother    Family history of colon cancer     2 family members over age 60    General anesthetics causing adverse effect in therapeutic use     per patient report slow to awaken    GERD (gastroesophageal reflux disease)     Kidney stones     Mild hyperlipidemia      Past Surgical History:   Procedure Laterality Date    breast augmentation      BREAST LUMPECTOMY      below right breast     SECTION      x 2    COLONOSCOPY      COLONOSCOPY N/A 3/8/2017    Procedure: COLONOSCOPY;  Surgeon: Torey Macias MD;  Location: [facility] ([address] [place] [address]);  Service: Endoscopy;  Laterality: N/A;    HYSTERECTOMY      AUB    OOPHORECTOMY      TRIGGER FINGER RELEASE Right 2017    thumb and 3rd digit    TUBAL LIGATION       Family History   Problem Relation Age of Onset    Colon cancer Father     Cancer Father      colon cancer    Breast cancer Mother 68    Colon cancer Mother 70     twice    Uterine cancer Mother     Diabetes Mother     Hypertension Mother     Cancer Mother 68     colon cancer    Breast cancer Maternal Aunt     Ovarian cancer Maternal Aunt     Lung cancer Maternal Uncle     No Known Problems Daughter     No Known Problems Son      labor Neg Hx     Eclampsia Neg Hx      Social History   Substance Use Topics    Smoking status: Never Smoker    Smokeless tobacco: Never Used    Alcohol use 0.0 oz/week      Comment: socially     OB History      Para Term  AB Living    2 2 2     2    SAB TAB Ectopic Multiple Live Births                       /72   Ht 5'  "4" (1.626 m)   Wt 61.8 kg (136 lb 3.9 oz)   BMI 23.39 kg/m²     ROS:    GENERAL: Denies weight gain or weight loss. Feeling well overall.   SKIN: Denies rash or lesions.   HEAD: Denies head injury or headache.   NODES: Denies enlarged lymph nodes.   CHEST: Denies chest pain or shortness of breath.   CARDIOVASCULAR: Denies palpitations or left sided chest pain.   ABDOMEN: No abdominal pain, constipation, diarrhea, nausea, vomiting or rectal bleeding.   URINARY: No frequency, dysuria, hematuria, or burning on urination.  REPRODUCTIVE: See HPI.   BREASTS: The patient performs breast self-examination and denies pain, lumps, or nipple discharge.   HEMATOLOGIC: No easy bruisability or excessive bleeding.   MUSCULOSKELETAL: Denies joint pain or swelling.   NEUROLOGIC: Denies syncope or weakness.   PSYCHIATRIC: Denies depression, anxiety or mood swings.      PHYSICAL EXAM:     APPEARANCE: Well nourished, well developed, in no acute distress.  AFFECT: WNL, alert and oriented x 3.  SKIN: No acne or hirsutism.  NECK: Neck symmetric without masses or thyromegaly.  NODES: No inguinal, cervical, axillary or femoral lymph node enlargement.  CHEST: Good respiratory effort.   ABDOMEN: Soft. No tenderness or masses. No hepatosplenomegaly. No hernias.  BREASTS: Symmetrical, no skin changes or visible lesions. No palpable masses, nipple discharge bilaterally.  PELVIC: Normal external female genitalia without lesions. Normal hair distribution. Adequate perineal body, normal urethral meatus. Vagina atrophic without lesions or discharge. No significant cystocele or rectocele. Bimanual exam shows uterus and cervix to be surgically absent. Adnexa without masses or tenderness.  EXTREMITIES: No edema.    ASSESSMENT & PLAN    ICD-10-CM ICD-9-CM    1. Well woman exam Z01.419 V72.31 Mammo Digital Screening Bilat with Tomosynthesis CAD   2. Vaginal atrophy N95.2 627.3 estradiol (ESTRING) 2 mg (7.5 mcg /24 hour) vaginal ring          Patient was " counseled today on A.C.S. Pap guidelines and recommendations for yearly pelvic exams, mammograms and monthly self breast exams; to see her PCP for other health maintenance.

## 2018-03-12 ENCOUNTER — HOSPITAL ENCOUNTER (OUTPATIENT)
Dept: RADIOLOGY | Facility: HOSPITAL | Age: 58
Discharge: HOME | End: 2018-03-12
Attending: OBSTETRICS & GYNECOLOGY
Payer: COMMERCIAL

## 2018-03-12 DIAGNOSIS — Z01.419 WELL WOMAN EXAM: ICD-10-CM

## 2018-03-12 PROCEDURE — 77063 BREAST TOMOSYNTHESIS BI: CPT | Mod: 26,,, | Performed by: RADIOLOGY

## 2018-03-12 PROCEDURE — 77067 SCR MAMMO BI INCL CAD: CPT | Mod: TC

## 2018-03-12 PROCEDURE — 77067 SCR MAMMO BI INCL CAD: CPT | Mod: 26,,, | Performed by: RADIOLOGY

## 2018-03-19 ENCOUNTER — TELEPHONE (OUTPATIENT)
Dept: OBSTETRICS AND GYNECOLOGY | Facility: CLINIC | Age: 58
End: 2018-03-19

## 2018-03-19 DIAGNOSIS — Z91.89 INCREASED RISK OF BREAST CANCER: Primary | ICD-10-CM

## 2018-03-19 NOTE — TELEPHONE ENCOUNTER
----- Message from Dustin Vaz LPN sent at 3/19/2018 10:27 AM CDT -----  Contact: Patient  Patient need order for referral for breast MRI  ----- Message -----  From: Juliana Petersen  Sent: 3/19/2018  10:00 AM  To: Rafaela Anthony Staff    X _1st Request  _  2nd Request  _  3rd Request    Who:ABDULAZIZ ACKERMAN [[id number]]    Why:Patient is calling to speak with someone concerning her mammogram results she received in the mail it states she needs to order a Breast MRI because of her Score Value of 23%    What Number to Call Back:[phone]    When to Expect a call back: (Before the end of the day)   -- if call after 3:00 call back will be tomorrow.

## 2018-04-04 ENCOUNTER — PATIENT MESSAGE (OUTPATIENT)
Dept: OBSTETRICS AND GYNECOLOGY | Facility: CLINIC | Age: 58
End: 2018-04-04

## 2018-04-04 ENCOUNTER — TELEPHONE (OUTPATIENT)
Dept: SURGERY | Facility: CLINIC | Age: 58
End: 2018-04-04

## 2018-04-04 NOTE — TELEPHONE ENCOUNTER
Returned the patient call regarding the message below.  All of the patient questions were answered.  The patient stated that she will contact her doctor office and call our office back at a later time if she needs to schedule an appointment with our office.

## 2018-04-04 NOTE — TELEPHONE ENCOUNTER
----- Message from Brianda Elizabeth sent at 4/4/2018 11:06 AM CDT -----  [phone]//pt states that she needs to speak with nurse in ref to a few questions about a breast MRI before scheduling her appt//please call//thank you

## 2018-04-05 ENCOUNTER — TELEPHONE (OUTPATIENT)
Dept: SURGERY | Facility: CLINIC | Age: 58
End: 2018-04-05

## 2018-04-05 NOTE — TELEPHONE ENCOUNTER
Returned the patient call regarding the message below.  The patient is scheduled to be seen on Tuesday 5/1/18 at 10 am with Jackie Ramos NP.  The patient voiced understanding of appointment date and time.  Reminder letter mailed to the patient.

## 2018-04-05 NOTE — TELEPHONE ENCOUNTER
----- Message from Liyah Calvillo sent at 4/5/2018  9:38 AM CDT -----  Contact: self   Xochitl States that she needs a call returned by a nurse in reference to an referral to [facility] for increased risk of breast cancer , Please call Xochitl @ [phone]  . Thanks :)

## 2018-05-01 ENCOUNTER — OFFICE VISIT (OUTPATIENT)
Dept: SURGERY | Facility: CLINIC | Age: 58
End: 2018-05-01
Payer: COMMERCIAL

## 2018-05-01 VITALS
BODY MASS INDEX: 23.39 KG/M2 | WEIGHT: 137 LBS | HEART RATE: 71 BPM | SYSTOLIC BLOOD PRESSURE: 106 MMHG | TEMPERATURE: 99 F | DIASTOLIC BLOOD PRESSURE: 73 MMHG | HEIGHT: 64 IN

## 2018-05-01 DIAGNOSIS — Z12.39 BREAST CANCER SCREENING, HIGH RISK PATIENT: Primary | ICD-10-CM

## 2018-05-01 DIAGNOSIS — Z80.3 FAMILY HISTORY OF BREAST CANCER: ICD-10-CM

## 2018-05-01 DIAGNOSIS — Z91.89 AT HIGH RISK FOR BREAST CANCER: ICD-10-CM

## 2018-05-01 PROCEDURE — 99202 OFFICE O/P NEW SF 15 MIN: CPT | Mod: S$GLB,,, | Performed by: NURSE PRACTITIONER

## 2018-05-01 PROCEDURE — 99999 PR PBB SHADOW E&M-EST. PATIENT-LVL III: CPT | Mod: PBBFAC,,, | Performed by: NURSE PRACTITIONER

## 2018-05-01 NOTE — PROGRESS NOTES
"Subjective:      Patient ID: Xochitl Weiss is a 57 y.o. female.    Chief Complaint: Breast Cancer Screening (High Risk Screening)      HPI: (PF, EPF - 1-3) (Detailed, Comp, - 4) patient presents today to discuss breast cancer risk reported on mammogram, referred by Dr Russo. She was previously seen by me for genetic counseling in  with negative Integrated BRACAnalysis. She also reports being tested for Stephen Syndrome approximately 10 years ago and was "ok". Patient denies palpable breast mass, pain, nipple discharge, redness, increased warmth. Normal CBE recently with Dr Russo therefore deferred today       3- screening mmg with no abnormality reported, extremely dense breasts, Imerer-Madick: 23 %    Menarche 14   first at 29  Menopause surgical at 34, remains on HRT  Nonsmoker  etoh occasional       Review of Systems  Objective:   Physical Exam  Assessment:       1. Breast cancer screening, high risk patient    2. Family history of breast cancer        Plan:       Reviewed results of mammogram and breast cancer risk per Tyrer Cuzick model.   Discussed family history and sporadic verses family clustering verses hereditary breast cancer.   Discussed environmental and modifiable risk factors for breast cancer including obesity, alcohol use, smoking.   Discussed there are several models available to estimate a woman's risk for breast cancer with differences in variables contributing to the reported estimated risk compared to the general population risk for breast cancer. Tyrer Cuzick is the model chosen by [facility] and calculated risk was discussed. Discussed an individuals risk may be lower or higher than the score provided. Discussed general population risk for breast cancer and high risk now being defined by ACS and NCCN as 20% or greater. Discussed current recommendations for risk management by ACS and NCCN including increased breast cancer screenings: semiannual CBE, annual mmg and " annual MRI to alternate every 6 months. Discussed pros and cons of screening breast MRI.  Discussed healthy diet and exercise, and limit alcohol intake to less than one drink/day    She desires to proceed with breast MRI as additional screening  Return in October for CBE and breast MRI. She will then alternate appts with me and her GYN for breast cancer screenings (see Dr Russo for mmg/CBE )     Time in counseling 20 min, total time 20 min

## 2018-05-01 NOTE — LETTER
May 1, 2018      Gabrielle Russo MD  [address]  [facility] [id number]           [facility]  [address]  [facility] [address]  Phone: [phone]  Fax: [phone]          Patient: Xochitl Weiss   MR Number: [id number]   YOB: 1960   Date of Visit: 5/1/2018       Dear Dr. Gabrielle Russo:    Thank you for referring Xochitl Weiss to me for evaluation. Attached you will find relevant portions of my assessment and plan of care.    If you have questions, please do not hesitate to call me. I look forward to following Xochitl Weiss along with you.    Sincerely,    DEISY Bay    Enclosure  CC:  No Recipients    If you would like to receive this communication electronically, please contact [email or web address] or [phone] to request more information on [employer] Link access.    For providers and/or their staff who would like to refer a patient to [facility], please contact us through our one-stop-shop provider referral line, [facility], at [phone].    If you feel you have received this communication in error or would no longer like to receive these types of communications, please e-mail [email or web address]

## 2018-06-22 DIAGNOSIS — B00.1 HERPES LABIALIS: Primary | ICD-10-CM

## 2018-06-22 RX ORDER — ACYCLOVIR 50 MG/G
OINTMENT TOPICAL
Qty: 15 G | Refills: 2 | Status: SHIPPED | OUTPATIENT
Start: 2018-06-22 | End: 2018-10-15

## 2018-06-22 NOTE — TELEPHONE ENCOUNTER
----- Message from Jenn Mccarty sent at 6/22/2018 12:58 PM CDT -----            Name of Who is Calling: ABDULAZIZ ACKERMAN [[id number]]      What is the request in detail pt is calling to have a RX called into [facility] for Acyclovir ointment for cold scores     Can the clinic reply by [facility]: no    What Number to Call Back if not in [place]: [phone]

## 2018-06-22 NOTE — TELEPHONE ENCOUNTER
Pt would like medication, this is a new medication request, patient have a cold sore on lips and pt says she use it in the past.

## 2018-08-30 ENCOUNTER — OFFICE VISIT (OUTPATIENT)
Dept: INTERNAL MEDICINE | Facility: CLINIC | Age: 58
End: 2018-08-30
Attending: INTERNAL MEDICINE
Payer: COMMERCIAL

## 2018-08-30 VITALS
BODY MASS INDEX: 22.12 KG/M2 | WEIGHT: 129.56 LBS | OXYGEN SATURATION: 98 % | HEART RATE: 72 BPM | SYSTOLIC BLOOD PRESSURE: 105 MMHG | DIASTOLIC BLOOD PRESSURE: 60 MMHG | HEIGHT: 64 IN

## 2018-08-30 DIAGNOSIS — N30.00 ACUTE CYSTITIS WITHOUT HEMATURIA: ICD-10-CM

## 2018-08-30 DIAGNOSIS — R30.0 DYSURIA: ICD-10-CM

## 2018-08-30 DIAGNOSIS — N63.31 MASS OF AXILLARY TAIL OF RIGHT BREAST: Primary | ICD-10-CM

## 2018-08-30 DIAGNOSIS — Z00.00 ANNUAL PHYSICAL EXAM: ICD-10-CM

## 2018-08-30 PROCEDURE — 87186 SC STD MICRODIL/AGAR DIL: CPT

## 2018-08-30 PROCEDURE — 87088 URINE BACTERIA CULTURE: CPT

## 2018-08-30 PROCEDURE — 99999 PR PBB SHADOW E&M-EST. PATIENT-LVL III: CPT | Mod: PBBFAC,,, | Performed by: INTERNAL MEDICINE

## 2018-08-30 PROCEDURE — 87077 CULTURE AEROBIC IDENTIFY: CPT

## 2018-08-30 PROCEDURE — 87086 URINE CULTURE/COLONY COUNT: CPT

## 2018-08-30 PROCEDURE — 99396 PREV VISIT EST AGE 40-64: CPT | Mod: S$GLB,,, | Performed by: INTERNAL MEDICINE

## 2018-08-30 NOTE — Clinical Note
Please ask lab if they can send a culture on the sample from today. Please inform pt that u/a was consistent with a UTI and bactrim was called into her pharmacy.Ultrasound of breast was normal.

## 2018-08-30 NOTE — PROGRESS NOTES
Subjective:       Patient ID: Xochitl Weiss is a 57 y.o. female.    Chief Complaint: Annual Exam     Xochitl Weiss is a 57 y.o.  female who presents for Annual Exam  .  Patient Active Problem List   Diagnosis    Encounter for long-term (current) use of other medications    Family history of colon cancer    GERD (gastroesophageal reflux disease)    Hydronephrosis    Family history of breast cancer    Diverticulitis    Premature menopause on HRT    Hemorrhoids, internal    Diverticulosis of large intestine without hemorrhage    Food impaction of esophagus    Esophageal foreign body    Recurrent UTI    Dysphagia     recetnly noticed a BB sized nodule under right arm.  Moveable, non tender, not changing. + strong family history of BRCA.       Health Maintenance       Date Due Completion Date    Influenza Vaccine 08/01/2018 9/27/2017 (Done)    Override on 9/27/2017: Done    Override on 11/22/2014: Done (per fax received from [employer])    Mammogram 03/12/2019 3/12/2018    Lipid Panel 04/05/2021 4/5/2016    Colonoscopy 03/08/2022 3/8/2017    TETANUS VACCINE 09/17/2025 9/17/2015          Review of Systems   Constitutional: Negative for activity change and unexpected weight change.   HENT: Negative for hearing loss, rhinorrhea and trouble swallowing.    Eyes: Negative for discharge and visual disturbance.   Respiratory: Negative for chest tightness and wheezing.    Cardiovascular: Negative for chest pain and palpitations.   Gastrointestinal: Negative for blood in stool, constipation, diarrhea and vomiting.   Endocrine: Negative for polydipsia and polyuria.   Genitourinary: Positive for dysuria. Negative for difficulty urinating, hematuria and menstrual problem.   Musculoskeletal: Negative for arthralgias, joint swelling and neck pain.   Neurological: Negative for weakness and headaches.   Psychiatric/Behavioral: Negative for confusion and dysphoric mood.         Past Medical History:   Diagnosis Date    BRCA  "negative     Colon polyp     Diverticula of colon     Diverticulitis     Family history of breast cancer     mother    Family history of colon cancer     2 family members over age 60    General anesthetics causing adverse effect in therapeutic use     per patient report slow to awaken    Genetic testing     negative Integrated BRACAnalysis    GERD (gastroesophageal reflux disease)     Kidney stones     Mild hyperlipidemia        Past Surgical History:   Procedure Laterality Date    breast augmentation      BREAST LUMPECTOMY      below right breast    BREAST SURGERY       SECTION      x 2    COLONOSCOPY      EYE SURGERY      blephroplasty    HYSTERECTOMY      AUB    OOPHORECTOMY      TRIGGER FINGER RELEASE Right 2017    thumb and 3rd digit    TUBAL LIGATION         Family History   Problem Relation Age of Onset    Colon cancer Father     Cancer Father         colon cancer    Breast cancer Mother 68    Colon cancer Mother 70        twice    Uterine cancer Mother     Diabetes Mother     Hypertension Mother     Cancer Mother         colon    Ovarian cancer Maternal Aunt     Lung cancer Maternal Uncle     No Known Problems Daughter     No Known Problems Son      labor Neg Hx     Eclampsia Neg Hx        Social History     Tobacco Use    Smoking status: Never Smoker    Smokeless tobacco: Never Used   Substance Use Topics    Alcohol use: Yes     Alcohol/week: 0.0 oz     Comment: socially    Drug use: No             Objective:   Blood pressure 105/60, pulse 72, height 5' 4" (1.626 m), weight 58.8 kg (129 lb 9.4 oz), SpO2 98 %.     Physical Exam   Constitutional: She is oriented to person, place, and time. She appears well-developed and well-nourished.   HENT:   Head: Normocephalic and atraumatic.   Neck: Carotid bruit is not present. No thyromegaly present.   Cardiovascular: Normal rate and normal heart sounds. Exam reveals no gallop and no friction rub.   No murmur " heard.  Pulmonary/Chest: Effort normal and breath sounds normal. She has no wheezes. She has no rales.       Abdominal: Soft. Bowel sounds are normal. She exhibits no distension. There is no tenderness.   Musculoskeletal: She exhibits no edema or tenderness.   Lymphadenopathy:     She has no cervical adenopathy.        Right: No supraclavicular adenopathy present.        Left: No supraclavicular adenopathy present.   Neurological: She is alert and oriented to person, place, and time. She has normal reflexes.   Skin: Skin is warm and dry.   Psychiatric: She has a normal mood and affect. Her behavior is normal.       Prior labs reviewed  Assessment/Plan:        Xochitl was seen today for annual exam.    Diagnoses and all orders for this visit:    Mass of axillary tail of right breast  -     US Breast Right Limited; Future  Area not concerning but with family history will check ultrasound    Annual physical exam  -     Comprehensive metabolic panel; Future  -     Lipid panel; Future  -     TSH; Future  Recommend daily sunscreen, cardiovascular exercise min 30 min 5 days per week. Seatbelts routinely.  Recommend monthly self breast exams and annual mammogram.  Also screening  pap with reflex HPV q 3 years or as recommended by gyn provider.    Dysuria  -     Urinalysis; Future  -     Urinalysis Microscopic; Future      ADDENDUM:  Acute cystitis without hematuria  -     Urine culture; Future    Other orders  -     sulfamethoxazole-trimethoprim 800-160mg (BACTRIM DS) 800-160 mg Tab; Take 1 tablet by mouth 2 (two) times daily. for 3 days

## 2018-09-06 ENCOUNTER — TELEPHONE (OUTPATIENT)
Dept: INTERNAL MEDICINE | Facility: CLINIC | Age: 58
End: 2018-09-06

## 2018-09-06 ENCOUNTER — HOSPITAL ENCOUNTER (OUTPATIENT)
Dept: RADIOLOGY | Facility: OTHER | Age: 58
Discharge: HOME | End: 2018-09-06
Attending: INTERNAL MEDICINE
Payer: COMMERCIAL

## 2018-09-06 DIAGNOSIS — N63.31 MASS OF AXILLARY TAIL OF RIGHT BREAST: ICD-10-CM

## 2018-09-06 PROCEDURE — 76642 ULTRASOUND BREAST LIMITED: CPT | Mod: 26,RT,, | Performed by: RADIOLOGY

## 2018-09-06 PROCEDURE — 76642 ULTRASOUND BREAST LIMITED: CPT | Mod: TC,RT

## 2018-09-06 RX ORDER — SULFAMETHOXAZOLE AND TRIMETHOPRIM 800; 160 MG/1; MG/1
1 TABLET ORAL 2 TIMES DAILY
Qty: 6 TABLET | Refills: 0 | Status: SHIPPED | OUTPATIENT
Start: 2018-09-06 | End: 2018-09-09

## 2018-09-06 NOTE — TELEPHONE ENCOUNTER
----- Message from Lily Stoddard MD sent at 9/6/2018  1:22 PM CDT -----  Please ask lab if they can send a culture on the sample from today. Please inform pt that u/a was consistent with a UTI and bactrim was called into her pharmacy.  Ultrasound of breast was normal.

## 2018-09-06 NOTE — TELEPHONE ENCOUNTER
Spoke w/ pt and informed her of urine results and have confirmed that an abx has been approved and sent to the pharmacy   Also spoke w/ pharmacy and they have linked urine culture to the urine that was collected.

## 2018-09-08 LAB — BACTERIA UR CULT: NORMAL

## 2018-09-19 ENCOUNTER — DOCUMENTATION ONLY (OUTPATIENT)
Dept: ADMINISTRATIVE | Facility: HOSPITAL | Age: 58
End: 2018-09-19

## 2018-09-20 ENCOUNTER — PATIENT MESSAGE (OUTPATIENT)
Dept: INTERNAL MEDICINE | Facility: CLINIC | Age: 58
End: 2018-09-20

## 2018-09-20 DIAGNOSIS — R35.0 FREQUENCY OF MICTURITION: Primary | ICD-10-CM

## 2018-09-20 RX ORDER — NITROFURANTOIN (MACROCRYSTALS) 100 MG/1
100 CAPSULE ORAL EVERY 12 HOURS
Qty: 20 CAPSULE | Refills: 0 | Status: SHIPPED | OUTPATIENT
Start: 2018-09-20 | End: 2018-09-30

## 2018-10-03 ENCOUNTER — TELEPHONE (OUTPATIENT)
Dept: SURGERY | Facility: CLINIC | Age: 58
End: 2018-10-03

## 2018-10-03 ENCOUNTER — HOSPITAL ENCOUNTER (OUTPATIENT)
Dept: RADIOLOGY | Facility: HOSPITAL | Age: 58
Discharge: HOME | End: 2018-10-03
Attending: NURSE PRACTITIONER
Payer: COMMERCIAL

## 2018-10-03 DIAGNOSIS — Z91.89 AT HIGH RISK FOR BREAST CANCER: ICD-10-CM

## 2018-10-03 PROCEDURE — 25500020 PHARM REV CODE 255: Performed by: NURSE PRACTITIONER

## 2018-10-03 PROCEDURE — A9577 INJ MULTIHANCE: HCPCS | Performed by: NURSE PRACTITIONER

## 2018-10-03 PROCEDURE — 77059 MRI BREAST BILATERAL W W/O CONTRAST: CPT | Mod: TC

## 2018-10-03 PROCEDURE — 77059 MRI BREAST BILATERAL W W/O CONTRAST: CPT | Mod: 26,,, | Performed by: RADIOLOGY

## 2018-10-03 RX ADMIN — GADOBENATE DIMEGLUMINE 13 ML: 529 INJECTION, SOLUTION INTRAVENOUS at 11:10

## 2018-10-03 NOTE — TELEPHONE ENCOUNTER
Contacted the patient regarding the message below.  The patient did not answer message left for the patient to contact our office regarding this matter.

## 2018-10-05 ENCOUNTER — TELEPHONE (OUTPATIENT)
Dept: SURGERY | Facility: CLINIC | Age: 58
End: 2018-10-05

## 2018-10-05 NOTE — TELEPHONE ENCOUNTER
Returned the patient call regarding the message below.  The patient is scheduled to be seen on Monday 10/15/18 at 8:30 am at [facility].  The patient voiced understanding of appointment date, time, and location.  Reminder letter mailed to the patient.

## 2018-10-05 NOTE — TELEPHONE ENCOUNTER
----- Message from Mauro Hall sent at 10/5/2018  9:55 AM CDT -----  Contact: Pt  Patient Returning Call from [facility]    Who Left Message for Patient: He  Communication Preference: [phone]   Additional Information: N/A

## 2018-10-15 ENCOUNTER — OFFICE VISIT (OUTPATIENT)
Dept: SURGERY | Facility: CLINIC | Age: 58
End: 2018-10-15
Payer: COMMERCIAL

## 2018-10-15 VITALS
HEART RATE: 65 BPM | TEMPERATURE: 99 F | HEIGHT: 64 IN | WEIGHT: 129.19 LBS | DIASTOLIC BLOOD PRESSURE: 69 MMHG | SYSTOLIC BLOOD PRESSURE: 110 MMHG | BODY MASS INDEX: 22.06 KG/M2

## 2018-10-15 DIAGNOSIS — Z12.39 BREAST CANCER SCREENING, HIGH RISK PATIENT: Primary | ICD-10-CM

## 2018-10-15 PROCEDURE — 99999 PR PBB SHADOW E&M-EST. PATIENT-LVL III: CPT | Mod: PBBFAC,,, | Performed by: NURSE PRACTITIONER

## 2018-10-15 PROCEDURE — 3008F BODY MASS INDEX DOCD: CPT | Mod: CPTII,S$GLB,, | Performed by: NURSE PRACTITIONER

## 2018-10-15 PROCEDURE — 99212 OFFICE O/P EST SF 10 MIN: CPT | Mod: S$GLB,,, | Performed by: NURSE PRACTITIONER

## 2018-10-15 RX ORDER — MUPIROCIN 20 MG/G
OINTMENT TOPICAL
Refills: 1 | COMMUNITY
Start: 2018-10-06 | End: 2021-04-22

## 2018-10-15 NOTE — PROGRESS NOTES
Subjective:      Patient ID: Xochitl Weiss is a 57 y.o. female.    Chief Complaint: Breast Cancer Screening (CBE)      HPI: (PF, EPF - 1-3) (Detailed, Comp, - 4) returning patient presents today for high risk screening. Patient denies palpable breast mass, pain, nipple discharge, redness, increased warmth. Normal CBE recently with Dr Russo therefore deferred today     10-3-2018 breast MRI with no abnormality reported      negative Integrated BRACAnalysis  Reports negative Stephen Syndrome testing approximately in  which was negative      3- screening mmg with no abnormality reported, extremely dense breasts, Xander: 23 %     Menarche 14   first at 29  Menopause surgical at 34, remains on HRT  Nonsmoker  etoh occasional       Review of Systems  Objective:   Physical Exam   Pulmonary/Chest: Right breast exhibits no inverted nipple, no mass, no nipple discharge, no skin change and no tenderness. Left breast exhibits no inverted nipple, no mass, no nipple discharge, no skin change and no tenderness. Breasts are symmetrical. There is no breast swelling.   Lymphadenopathy:     She has no cervical adenopathy.     She has no axillary adenopathy.        Right: No supraclavicular adenopathy present.        Left: No supraclavicular adenopathy present.     Assessment:       1. Breast cancer screening, high risk patient        Plan:     clinically YOHANA  Return in one year with breast MRI and CBE, can see her GYN for CBE/mmg in 6 months  Call for any interval palpable breast mass, pain, nipple discharge, skin changes or other breast related concerns

## 2019-01-07 ENCOUNTER — OFFICE VISIT (OUTPATIENT)
Dept: PRIMARY CARE CLINIC | Facility: CLINIC | Age: 59
End: 2019-01-07
Payer: COMMERCIAL

## 2019-01-07 VITALS
OXYGEN SATURATION: 96 % | TEMPERATURE: 99 F | HEART RATE: 78 BPM | WEIGHT: 135.81 LBS | SYSTOLIC BLOOD PRESSURE: 114 MMHG | HEIGHT: 64 IN | BODY MASS INDEX: 23.18 KG/M2 | DIASTOLIC BLOOD PRESSURE: 69 MMHG

## 2019-01-07 DIAGNOSIS — J06.9 URI WITH COUGH AND CONGESTION: Primary | ICD-10-CM

## 2019-01-07 PROCEDURE — 99213 OFFICE O/P EST LOW 20 MIN: CPT | Mod: S$GLB,,, | Performed by: NURSE PRACTITIONER

## 2019-01-07 PROCEDURE — 3008F PR BODY MASS INDEX (BMI) DOCUMENTED: ICD-10-PCS | Mod: CPTII,S$GLB,, | Performed by: NURSE PRACTITIONER

## 2019-01-07 PROCEDURE — 99213 PR OFFICE/OUTPT VISIT, EST, LEVL III, 20-29 MIN: ICD-10-PCS | Mod: S$GLB,,, | Performed by: NURSE PRACTITIONER

## 2019-01-07 PROCEDURE — 99999 PR PBB SHADOW E&M-EST. PATIENT-LVL IV: CPT | Mod: PBBFAC,,, | Performed by: NURSE PRACTITIONER

## 2019-01-07 PROCEDURE — 99999 PR PBB SHADOW E&M-EST. PATIENT-LVL IV: ICD-10-PCS | Mod: PBBFAC,,, | Performed by: NURSE PRACTITIONER

## 2019-01-07 PROCEDURE — 3008F BODY MASS INDEX DOCD: CPT | Mod: CPTII,S$GLB,, | Performed by: NURSE PRACTITIONER

## 2019-01-07 RX ORDER — FLUTICASONE PROPIONATE 50 MCG
1 SPRAY, SUSPENSION (ML) NASAL DAILY
Qty: 1 BOTTLE | Refills: 0 | Status: SHIPPED | OUTPATIENT
Start: 2019-01-07 | End: 2021-04-22

## 2019-01-07 RX ORDER — BENZONATATE 100 MG/1
100 CAPSULE ORAL 3 TIMES DAILY PRN
Qty: 30 CAPSULE | Refills: 0 | Status: SHIPPED | OUTPATIENT
Start: 2019-01-07 | End: 2019-01-17

## 2019-01-07 NOTE — PROGRESS NOTES
Chief Complaint: Chest Congestion and Cough      HPI     Xochitl Weiss is a 58 y.o. female with GERD who presents for an urgent visit today. She is an established patient of Dr Stoddard but new to me.    Pt c/o 7 days of nasal congestion with clear to yellow rhinorrhea. Cough is mainly dry, but does have chest congestion. Reports subj fever and chills for one day at onset, but not now. +Sore throat. Does have some SOB and chest discomfort with coughing. No wheezing.  Using otc meds (dayquil and nyquil, delsym, sudafed and theraflu) with some relief.   Was sick a few URI symptoms a few weeks ago, but not this bad. Symptoms resolved for a few days. Did travel out of town on airplane during time of feeling better.      Past Medical History:  Past Medical History:   Diagnosis Date    BRCA negative     Colon polyp     Diverticula of colon     Diverticulitis     Family history of breast cancer     mother    Family history of colon cancer     2 family members over age 60    General anesthetics causing adverse effect in therapeutic use     per patient report slow to awaken    Genetic testing 2014    negative Integrated BRACAnalysis    GERD (gastroesophageal reflux disease)     Kidney stones     Mild hyperlipidemia        Review of Systems:  Review of Systems   Constitutional: Negative for activity change, appetite change, chills, diaphoresis, fatigue and fever.   HENT: Positive for congestion, postnasal drip, rhinorrhea and sore throat. Negative for ear pain, sinus pressure, sinus pain, sneezing and trouble swallowing.    Eyes: Negative for visual disturbance.   Respiratory: Positive for cough and chest tightness. Negative for shortness of breath and wheezing.    Cardiovascular: Negative for chest pain, palpitations and leg swelling.   Gastrointestinal: Negative for abdominal pain, blood in stool, constipation, diarrhea, nausea and vomiting.   Genitourinary: Negative for dysuria, flank pain and frequency.  "  Skin: Negative for rash.   Neurological: Negative for dizziness, syncope, weakness, light-headedness and headaches.         PHYSICAL EXAM     Vitals:    01/07/19 1244   BP: 114/69   BP Location: Right arm   Patient Position: Sitting   Pulse: 78   Temp: 98.5 °F (36.9 °C)   SpO2: 96%   Weight: 61.6 kg (135 lb 12.9 oz)   Height: 5' 4" (1.626 m)       Physical Exam   Constitutional: She is oriented to person, place, and time. She appears well-developed and well-nourished. No distress.   HENT:   Head: Normocephalic and atraumatic.   Right Ear: Tympanic membrane, external ear and ear canal normal.   Left Ear: Tympanic membrane, external ear and ear canal normal.   Nose: Nose normal.   Mouth/Throat: Posterior oropharyngeal erythema (mild) present. No oropharyngeal exudate.   Eyes: Conjunctivae and EOM are normal. Pupils are equal, round, and reactive to light.   Neck: Normal range of motion. Neck supple. No tracheal deviation present. No thyromegaly present.   Cardiovascular: Normal rate, regular rhythm, normal heart sounds and intact distal pulses. Exam reveals no friction rub.   No murmur heard.  Pulmonary/Chest: Effort normal and breath sounds normal. No stridor. No respiratory distress. She has no wheezes. She has no rales.   Musculoskeletal: Normal range of motion. She exhibits no edema.   Lymphadenopathy:     She has no cervical adenopathy.   Neurological: She is alert and oriented to person, place, and time.   Skin: Skin is warm and dry. She is not diaphoretic. No erythema.   Psychiatric: She has a normal mood and affect. Her behavior is normal. Thought content normal.   Vitals reviewed.      Assessment:       1. URI with cough and congestion        Plan:       Xochitl was seen today for chest congestion and cough.    Diagnoses and all orders for this visit:    URI with cough and congestion - presumed viral. Lungs clear. Will treat symptoms.  -     benzonatate (TESSALON) 100 MG capsule; Take 1 capsule (100 mg total) " by mouth 3 (three) times daily as needed for Cough.  -     fluticasone (FLONASE) 50 mcg/actuation nasal spray; 1 spray (50 mcg total) by Each Nare route once daily.    - Distilled salt water sinus rinses via neti pots or products such as Gonzalo Med Sinus Rinse or Sinugator. Must wash container or device and use bottled water or distilled water to avoid introducing infection.  - Nasal Steroids (Nasocort, Rhinocort, Flonase)  - Can continue Decongestants (Pseudoephedrine)-Sudafed, only take for a couple of days  - Add Mucinex DM   - Strongly encouraged adequate hydration and rest.  - Can take acetaminophen or ibuprofen as needed and as directed on bottle for any pain.   - Saline gargle and OTC throat lozenges as needed for sore throat.  - Avoid smoking and alcohol until symptoms improve.  - Hand washing for prevention.      - Return if symptoms worsen or do not improve in 1 week.     Follow up with PCP as needed or if symptoms worsen or fail to improve over the next several days.

## 2019-01-07 NOTE — PATIENT INSTRUCTIONS
- Distilled salt water sinus rinses via neti pots or products such as Gonzalo Med Sinus Rinse or Sinugator. Must wash container or device and use bottled water or distilled water to avoid introducing infection.  - Nasal Steroids (Nasocort, Rhinocort, Flonase)  - Can add...Decongestants (Pseudoephedrine)-Sudafed, only take for a couple of days  - Add Mucinex DM or tessalon pearls  - Strongly encouraged adequate hydration and rest.  - Can take acetaminophen or ibuprofen as needed and as directed on bottle for any pain.   - Saline gargle and OTC throat lozenges as needed for sore throat.  - Avoid smoking and alcohol until symptoms improve.  - Hand washing for prevention.      - Return if symptoms worsen or do not improve in 1 week.

## 2019-02-28 ENCOUNTER — TELEPHONE (OUTPATIENT)
Dept: OBSTETRICS AND GYNECOLOGY | Facility: CLINIC | Age: 59
End: 2019-02-28

## 2019-02-28 DIAGNOSIS — N95.2 VAGINAL ATROPHY: ICD-10-CM

## 2019-04-11 ENCOUNTER — TELEPHONE (OUTPATIENT)
Dept: OBSTETRICS AND GYNECOLOGY | Facility: CLINIC | Age: 59
End: 2019-04-11

## 2019-04-11 DIAGNOSIS — Z12.31 BREAST CANCER SCREENING BY MAMMOGRAM: Primary | ICD-10-CM

## 2019-04-15 ENCOUNTER — OFFICE VISIT (OUTPATIENT)
Dept: OBSTETRICS AND GYNECOLOGY | Facility: CLINIC | Age: 59
End: 2019-04-15
Attending: OBSTETRICS & GYNECOLOGY
Payer: COMMERCIAL

## 2019-04-15 VITALS
WEIGHT: 139.56 LBS | DIASTOLIC BLOOD PRESSURE: 64 MMHG | BODY MASS INDEX: 23.82 KG/M2 | SYSTOLIC BLOOD PRESSURE: 115 MMHG | HEIGHT: 64 IN

## 2019-04-15 DIAGNOSIS — N95.2 VAGINAL ATROPHY: ICD-10-CM

## 2019-04-15 DIAGNOSIS — Z01.419 WELL WOMAN EXAM: Primary | ICD-10-CM

## 2019-04-15 DIAGNOSIS — E28.319 PREMATURE MENOPAUSE ON HRT: ICD-10-CM

## 2019-04-15 DIAGNOSIS — Z80.3 FAMILY HISTORY OF BREAST CANCER: ICD-10-CM

## 2019-04-15 DIAGNOSIS — Z79.890 PREMATURE MENOPAUSE ON HRT: ICD-10-CM

## 2019-04-15 PROCEDURE — 99999 PR PBB SHADOW E&M-EST. PATIENT-LVL III: ICD-10-PCS | Mod: PBBFAC,,, | Performed by: OBSTETRICS & GYNECOLOGY

## 2019-04-15 PROCEDURE — 99999 PR PBB SHADOW E&M-EST. PATIENT-LVL III: CPT | Mod: PBBFAC,,, | Performed by: OBSTETRICS & GYNECOLOGY

## 2019-04-15 PROCEDURE — 99396 PR PREVENTIVE VISIT,EST,40-64: ICD-10-PCS | Mod: S$GLB,,, | Performed by: OBSTETRICS & GYNECOLOGY

## 2019-04-15 PROCEDURE — 99396 PREV VISIT EST AGE 40-64: CPT | Mod: S$GLB,,, | Performed by: OBSTETRICS & GYNECOLOGY

## 2019-04-15 RX ORDER — ESTRADIOL 0.1 MG/G
1 CREAM VAGINAL DAILY
Qty: 42.5 G | Refills: 4 | Status: SHIPPED | OUTPATIENT
Start: 2019-04-15 | End: 2020-04-20

## 2019-04-15 NOTE — PROGRESS NOTES
CC: Well woman exam    Xochitl Weiss is a 57 y.o. female  status post hyst/bso presents for a well woman exam.  No current issues, problems, or complaints.   Has been using vaginal estrogen (femring) since her hysterectomy and doing well on it.   States it is expensive because she has a high deductible.      Past Medical History:   Diagnosis Date    BRCA negative     Colon polyp     Diverticula of colon     Diverticulitis     Family history of breast cancer     mother    Family history of colon cancer     2 family members over age 60    General anesthetics causing adverse effect in therapeutic use     per patient report slow to awaken    Genetic testing     negative Integrated BRACAnalysis    GERD (gastroesophageal reflux disease)     Kidney stones     Mild hyperlipidemia      Past Surgical History:   Procedure Laterality Date    breast augmentation      BREAST LUMPECTOMY      below right breast    BREAST SURGERY       SECTION      x 2    COLONOSCOPY      COLONOSCOPY N/A 3/8/2017    [facility] ([facility] F[facility]    ESOPHAGOGASTRODUODENOSCOPY (EGD) N/A 2017    [facility] (4[facility] FLR)    ESOPHAGOGASTRODUODENOSCOPY (EGD) N/A 2017    [facility] Vaz [facility] at [facility] (2ND FLR)    EYE SURGERY      blephroplasty    HYSTERECTOMY      AUB    OOPHORECTOMY      RELEASE-FINGER-TRIGGER-RIGHT THUMB AND LONG FINGER TRIGGER RELEASE Right 3/17/2017    [facility]    TRIGGER FINGER RELEASE Right 2017    thumb and 3rd digit    TUBAL LIGATION       Family History   Problem Relation Age of Onset    Colon cancer Father     Cancer Father         colon cancer    Breast cancer Mother 68    Colon cancer Mother 70        twice    Uterine cancer Mother     Diabetes Mother     Hypertension Mother     Cancer Mother         colon    Ovarian cancer Maternal Aunt     Lung  "cancer Maternal Uncle     No Known Problems Daughter     No Known Problems Son      labor Neg Hx     Eclampsia Neg Hx      Social History     Tobacco Use    Smoking status: Never Smoker    Smokeless tobacco: Never Used   Substance Use Topics    Alcohol use: Yes     Alcohol/week: 0.0 oz     Comment: socially    Drug use: No     OB History        2    Para   2    Term   2            AB        Living   2       SAB        TAB        Ectopic        Multiple        Live Births                     /64   Ht 5' 4" (1.626 m)   Wt 63.3 kg (139 lb 8.8 oz)   BMI 23.95 kg/m²     ROS:    GENERAL: Denies weight gain or weight loss. Feeling well overall.   SKIN: Denies rash or lesions.   HEAD: Denies head injury or headache.   NODES: Denies enlarged lymph nodes.   CHEST: Denies chest pain or shortness of breath.   CARDIOVASCULAR: Denies palpitations or left sided chest pain.   ABDOMEN: No abdominal pain, constipation, diarrhea, nausea, vomiting or rectal bleeding.   URINARY: No frequency, dysuria, hematuria, or burning on urination.  REPRODUCTIVE: See HPI.   BREASTS: The patient performs breast self-examination and denies pain, lumps, or nipple discharge.   HEMATOLOGIC: No easy bruisability or excessive bleeding.   MUSCULOSKELETAL: Denies joint pain or swelling.   NEUROLOGIC: Denies syncope or weakness.   PSYCHIATRIC: Denies depression, anxiety or mood swings.      PHYSICAL EXAM:     APPEARANCE: Well nourished, well developed, in no acute distress.  AFFECT: WNL, alert and oriented x 3.  SKIN: No acne or hirsutism.  NECK: Neck symmetric without masses or thyromegaly.  NODES: No inguinal, cervical, axillary or femoral lymph node enlargement.  CHEST: Good respiratory effort.   ABDOMEN: Soft. No tenderness or masses. No hepatosplenomegaly. No hernias.  BREASTS: Symmetrical, no skin changes or visible lesions. No palpable masses, nipple discharge bilaterally.  PELVIC: Normal external female genitalia " without lesions. Normal hair distribution. Adequate perineal body, normal urethral meatus. Vagina atrophic without lesions or discharge. No significant cystocele or rectocele. Bimanual exam shows uterus and cervix to be surgically absent. Adnexa without masses or tenderness.  EXTREMITIES: No edema.    ASSESSMENT    ICD-10-CM ICD-9-CM    1. Well woman exam Z01.419 V72.31    2. Vaginal atrophy N95.2 627.3 estradiol (ESTRACE) 0.01 % (0.1 mg/gram) vaginal cream   3. Family history of breast cancer Z80.3 V16.3    4. Premature menopause on HRT E28.319 256.31     Z79.890          Will try vaginal estrace - can decrease dose based on symptoms.  Patient was counseled today on A.C.S. Pap guidelines and recommendations for yearly pelvic exams, mammograms and monthly self breast exams; to see her PCP for other health maintenance.

## 2019-04-25 ENCOUNTER — HOSPITAL ENCOUNTER (OUTPATIENT)
Dept: RADIOLOGY | Facility: OTHER | Age: 59
Discharge: HOME | End: 2019-04-25
Attending: OBSTETRICS & GYNECOLOGY
Payer: COMMERCIAL

## 2019-04-25 DIAGNOSIS — Z12.31 BREAST CANCER SCREENING BY MAMMOGRAM: ICD-10-CM

## 2019-04-25 PROCEDURE — 77067 SCR MAMMO BI INCL CAD: CPT | Mod: 26,,, | Performed by: RADIOLOGY

## 2019-04-25 PROCEDURE — 77067 MAMMO DIGITAL SCREENING BILAT WITH TOMOSYNTHESIS_CAD: ICD-10-PCS | Mod: 26,,, | Performed by: RADIOLOGY

## 2019-04-25 PROCEDURE — 77063 BREAST TOMOSYNTHESIS BI: CPT | Mod: 26,,, | Performed by: RADIOLOGY

## 2019-04-25 PROCEDURE — 77063 MAMMO DIGITAL SCREENING BILAT WITH TOMOSYNTHESIS_CAD: ICD-10-PCS | Mod: 26,,, | Performed by: RADIOLOGY

## 2019-04-25 PROCEDURE — 77067 SCR MAMMO BI INCL CAD: CPT | Mod: TC

## 2019-08-09 RX ORDER — BIMATOPROST 3 UG/ML
1 SOLUTION TOPICAL NIGHTLY
Qty: 5 ML | Refills: 12 | Status: SHIPPED | OUTPATIENT
Start: 2019-08-09 | End: 2021-04-22

## 2019-08-09 RX ORDER — BIMATOPROST 3 UG/ML
1 SOLUTION TOPICAL NIGHTLY
Qty: 5 ML | Refills: 12 | Status: SHIPPED | OUTPATIENT
Start: 2019-08-09 | End: 2019-08-09 | Stop reason: SDUPTHER

## 2019-10-07 ENCOUNTER — OFFICE VISIT (OUTPATIENT)
Dept: INTERNAL MEDICINE | Facility: CLINIC | Age: 59
End: 2019-10-07
Attending: INTERNAL MEDICINE
Payer: COMMERCIAL

## 2019-10-07 ENCOUNTER — LAB VISIT (OUTPATIENT)
Dept: LAB | Facility: OTHER | Age: 59
End: 2019-10-07
Attending: INTERNAL MEDICINE
Payer: COMMERCIAL

## 2019-10-07 VITALS
DIASTOLIC BLOOD PRESSURE: 67 MMHG | HEIGHT: 64 IN | OXYGEN SATURATION: 98 % | HEART RATE: 66 BPM | WEIGHT: 140.19 LBS | SYSTOLIC BLOOD PRESSURE: 113 MMHG | BODY MASS INDEX: 23.93 KG/M2

## 2019-10-07 DIAGNOSIS — Z91.89 AT HIGH RISK FOR BREAST CANCER: ICD-10-CM

## 2019-10-07 DIAGNOSIS — M25.562 LEFT ANTERIOR KNEE PAIN: ICD-10-CM

## 2019-10-07 DIAGNOSIS — Z00.00 ANNUAL PHYSICAL EXAM: Primary | ICD-10-CM

## 2019-10-07 DIAGNOSIS — Z00.00 ANNUAL PHYSICAL EXAM: ICD-10-CM

## 2019-10-07 LAB
25(OH)D3+25(OH)D2 SERPL-MCNC: 41 NG/ML (ref 30–96)
ALBUMIN SERPL BCP-MCNC: 4.1 G/DL (ref 3.5–5.2)
ALP SERPL-CCNC: 66 U/L (ref 55–135)
ALT SERPL W/O P-5'-P-CCNC: 18 U/L (ref 10–44)
ANION GAP SERPL CALC-SCNC: 9 MMOL/L (ref 8–16)
AST SERPL-CCNC: 21 U/L (ref 10–40)
BASOPHILS # BLD AUTO: 0.04 K/UL (ref 0–0.2)
BASOPHILS NFR BLD: 0.6 % (ref 0–1.9)
BILIRUB SERPL-MCNC: 0.6 MG/DL (ref 0.1–1)
BUN SERPL-MCNC: 13 MG/DL (ref 6–20)
CALCIUM SERPL-MCNC: 9.5 MG/DL (ref 8.7–10.5)
CHLORIDE SERPL-SCNC: 103 MMOL/L (ref 95–110)
CHOLEST SERPL-MCNC: 255 MG/DL (ref 120–199)
CHOLEST/HDLC SERPL: 3.1 {RATIO} (ref 2–5)
CO2 SERPL-SCNC: 27 MMOL/L (ref 23–29)
CREAT SERPL-MCNC: 0.8 MG/DL (ref 0.5–1.4)
DIFFERENTIAL METHOD: ABNORMAL
EOSINOPHIL # BLD AUTO: 0.2 K/UL (ref 0–0.5)
EOSINOPHIL NFR BLD: 3.2 % (ref 0–8)
ERYTHROCYTE [DISTWIDTH] IN BLOOD BY AUTOMATED COUNT: 12.1 % (ref 11.5–14.5)
EST. GFR  (AFRICAN AMERICAN): >60 ML/MIN/1.73 M^2
EST. GFR  (NON AFRICAN AMERICAN): >60 ML/MIN/1.73 M^2
GLUCOSE SERPL-MCNC: 96 MG/DL (ref 70–110)
HCT VFR BLD AUTO: 43 % (ref 37–48.5)
HDLC SERPL-MCNC: 83 MG/DL (ref 40–75)
HDLC SERPL: 32.5 % (ref 20–50)
HGB BLD-MCNC: 14.3 G/DL (ref 12–16)
IMM GRANULOCYTES # BLD AUTO: 0.01 K/UL (ref 0–0.04)
IMM GRANULOCYTES NFR BLD AUTO: 0.2 % (ref 0–0.5)
LDLC SERPL CALC-MCNC: 148.8 MG/DL (ref 63–159)
LYMPHOCYTES # BLD AUTO: 2.4 K/UL (ref 1–4.8)
LYMPHOCYTES NFR BLD: 36.2 % (ref 18–48)
MCH RBC QN AUTO: 29.2 PG (ref 27–31)
MCHC RBC AUTO-ENTMCNC: 33.3 G/DL (ref 32–36)
MCV RBC AUTO: 88 FL (ref 82–98)
MONOCYTES # BLD AUTO: 0.6 K/UL (ref 0.3–1)
MONOCYTES NFR BLD: 8.4 % (ref 4–15)
NEUTROPHILS # BLD AUTO: 3.4 K/UL (ref 1.8–7.7)
NEUTROPHILS NFR BLD: 51.4 % (ref 38–73)
NONHDLC SERPL-MCNC: 172 MG/DL
NRBC BLD-RTO: 0 /100 WBC
PLATELET # BLD AUTO: 277 K/UL (ref 150–350)
PMV BLD AUTO: 9.1 FL (ref 9.2–12.9)
POTASSIUM SERPL-SCNC: 4.2 MMOL/L (ref 3.5–5.1)
PROT SERPL-MCNC: 7.1 G/DL (ref 6–8.4)
RBC # BLD AUTO: 4.9 M/UL (ref 4–5.4)
SODIUM SERPL-SCNC: 139 MMOL/L (ref 136–145)
TRIGL SERPL-MCNC: 116 MG/DL (ref 30–150)
TSH SERPL DL<=0.005 MIU/L-ACNC: 3.26 UIU/ML (ref 0.4–4)
WBC # BLD AUTO: 6.57 K/UL (ref 3.9–12.7)

## 2019-10-07 PROCEDURE — 82306 VITAMIN D 25 HYDROXY: CPT

## 2019-10-07 PROCEDURE — 85025 COMPLETE CBC W/AUTO DIFF WBC: CPT

## 2019-10-07 PROCEDURE — 99999 PR PBB SHADOW E&M-EST. PATIENT-LVL III: ICD-10-PCS | Mod: PBBFAC,,, | Performed by: INTERNAL MEDICINE

## 2019-10-07 PROCEDURE — 99396 PREV VISIT EST AGE 40-64: CPT | Mod: S$GLB,,, | Performed by: INTERNAL MEDICINE

## 2019-10-07 PROCEDURE — 36415 COLL VENOUS BLD VENIPUNCTURE: CPT

## 2019-10-07 PROCEDURE — 80061 LIPID PANEL: CPT

## 2019-10-07 PROCEDURE — 84443 ASSAY THYROID STIM HORMONE: CPT

## 2019-10-07 PROCEDURE — 80053 COMPREHEN METABOLIC PANEL: CPT

## 2019-10-07 PROCEDURE — 99396 PR PREVENTIVE VISIT,EST,40-64: ICD-10-PCS | Mod: S$GLB,,, | Performed by: INTERNAL MEDICINE

## 2019-10-07 PROCEDURE — 99999 PR PBB SHADOW E&M-EST. PATIENT-LVL III: CPT | Mod: PBBFAC,,, | Performed by: INTERNAL MEDICINE

## 2019-10-07 NOTE — PROGRESS NOTES
Subjective:       Patient ID: Xochitl Weiss is a 58 y.o. female.    Chief Complaint: Annual Exam     Xochitl Weiss is a 58 y.o.  female who presents for Annual Exam  .  Patient Active Problem List   Diagnosis    Encounter for long-term (current) use of other medications    Family history of colon cancer    GERD (gastroesophageal reflux disease)    Hydronephrosis    Family history of breast cancer    Diverticulitis    Premature menopause on HRT    Hemorrhoids, internal    Diverticulosis of large intestine without hemorrhage    Food impaction of esophagus    Esophageal foreign body    Recurrent UTI    Dysphagia    At high risk for breast cancer     The 10-year ASCVD risk score (Eugene MOODY Jr., et al., 2013) is: 1.5%    Values used to calculate the score:      Age: 58 years      Sex: Female      Is Non- : No      Diabetic: No      Tobacco smoker: No      Systolic Blood Pressure: 113 mmHg      Is BP treated: No      HDL Cholesterol: 79 mg/dL      Total Cholesterol: 238 mg/dL      Health Maintenance       Date Due Completion Date    Shingles Vaccine (1 of 2) 11/08/2012 ---    Mammogram 04/25/2020 4/25/2019    Colonoscopy 03/08/2022 3/8/2017    Lipid Panel 09/06/2023 9/6/2018    TETANUS VACCINE 09/17/2025 9/17/2015          Review of Systems   Constitutional: Negative for chills and fever.   HENT: Negative for rhinorrhea and sore throat.    Respiratory: Negative for cough and shortness of breath.    Cardiovascular: Negative for chest pain and palpitations.   Gastrointestinal: Negative for nausea and vomiting.   Genitourinary: Negative for dysuria and hematuria.   Musculoskeletal: Negative for arthralgias and back pain.   Skin: Negative for color change and rash.   Neurological: Negative for weakness and numbness.   Psychiatric/Behavioral: Negative for agitation and dysphoric mood.         Past Medical History:   Diagnosis Date    BRCA negative     Colon polyp     Diverticula  "of colon     Diverticulitis     Family history of breast cancer     mother    Family history of colon cancer     2 family members over age 60    General anesthetics causing adverse effect in therapeutic use     per patient report slow to awaken    Genetic testing     negative Integrated BRACAnalysis    GERD (gastroesophageal reflux disease)     Kidney stones     Mild hyperlipidemia        Past Surgical History:   Procedure Laterality Date    breast augmentation      BREAST LUMPECTOMY      below right breast    BREAST SURGERY       SECTION      x 2    COLONOSCOPY      COLONOSCOPY N/A 3/8/2017    Procedure: COLONOSCOPY;  Surgeon: Torey Macias MD;  Location: [facility] ([address] [place] [address]);  Service: Endoscopy;  Laterality: N/A;    EYE SURGERY      blephroplasty    HYSTERECTOMY      AUB    OOPHORECTOMY      TRIGGER FINGER RELEASE Right 2017    thumb and 3rd digit    TUBAL LIGATION         Family History   Problem Relation Age of Onset    Colon cancer Father     Cancer Father         colon cancer    Breast cancer Mother 68    Colon cancer Mother 70        twice    Uterine cancer Mother     Diabetes Mother     Hypertension Mother     Cancer Mother         colon    Ovarian cancer Maternal Aunt     Lung cancer Maternal Uncle     No Known Problems Daughter     No Known Problems Son      labor Neg Hx     Eclampsia Neg Hx        Social History     Tobacco Use    Smoking status: Never Smoker    Smokeless tobacco: Never Used   Substance Use Topics    Alcohol use: Yes     Alcohol/week: 0.0 standard drinks     Frequency: 2-3 times a week     Drinks per session: 1 or 2     Binge frequency: Never     Comment: socially    Drug use: No             Objective:   Blood pressure 113/67, pulse 66, height 5' 4" (1.626 m), weight 63.6 kg (140 lb 3.4 oz), SpO2 98 %.     Physical Exam   Constitutional: She is oriented to person, place, and time. She appears well-developed and well-nourished. "   HENT:   Head: Normocephalic and atraumatic.   Neck: Carotid bruit is not present. No thyromegaly present.   Cardiovascular: Normal rate and normal heart sounds. Exam reveals no gallop and no friction rub.   No murmur heard.  Pulmonary/Chest: Effort normal and breath sounds normal. She has no wheezes. She has no rales.   Abdominal: Soft. Bowel sounds are normal. She exhibits no distension. There is no tenderness.   Musculoskeletal: She exhibits no edema or tenderness.   Left knee pain, anteriorly, denies trauma   Lymphadenopathy:     She has no cervical adenopathy.        Right: No supraclavicular adenopathy present.        Left: No supraclavicular adenopathy present.   Neurological: She is alert and oriented to person, place, and time. She has normal reflexes.   Skin: Skin is warm and dry.   Psychiatric: She has a normal mood and affect. Her behavior is normal.       Prior labs reviewed  Assessment/Plan:        Xochitl was seen today for annual exam.    Diagnoses and all orders for this visit:    Annual physical exam  Recommend daily sunscreen, cardiovascular exercise min 30 min 5 days per week. Seatbelts routinely.  Recommend monthly self breast exams and annual mammogram.  Also screening  pap with reflex HPV q 3 years or as recommended by gyn provider.    Left anterior knee pain  -     Ambulatory referral/consult to Orthopedics; Future    At high risk for breast cancer  Followed by breast cancer clinic with mri and mammo q 6 mo      Medication List with Changes/Refills   Current Medications    BIMATOPROST (LATISSE) 0.03 % OPHTHALMIC SOLUTION    Place 1 application into both eyes every evening. Place one drop on applicator and apply evenly along the skin of the upper eyelid at base of eyelashes once daily at bedtime; repeat procedure for second eye (use a clean applicator).    ESTRADIOL (ESTRACE) 0.01 % (0.1 MG/GRAM) VAGINAL CREAM    Place 1 g vaginally once daily.    FLUCELVAX QUAD 6055-5689, PF, 60 MCG (15 MCG X  4)/0.5 ML SYRG    ADM 0.5ML IM UTD    FLUTICASONE (FLONASE) 50 MCG/ACTUATION NASAL SPRAY    1 spray (50 mcg total) by Each Nare route once daily.    MUPIROCIN (BACTROBAN) 2 % OINTMENT    APPLY TO AFFECTED AREA TWICE A DAY FOR 3 WEEKS    PANTOPRAZOLE (PROTONIX) 20 MG TABLET    Take 1 tablet (20 mg total) by mouth before breakfast.

## 2019-10-10 ENCOUNTER — TELEPHONE (OUTPATIENT)
Dept: SPORTS MEDICINE | Facility: CLINIC | Age: 59
End: 2019-10-10

## 2019-10-14 ENCOUNTER — TELEPHONE (OUTPATIENT)
Dept: INTERNAL MEDICINE | Facility: CLINIC | Age: 59
End: 2019-10-14

## 2019-10-14 ENCOUNTER — TELEPHONE (OUTPATIENT)
Dept: SURGERY | Facility: CLINIC | Age: 59
End: 2019-10-14

## 2019-10-14 DIAGNOSIS — Z91.89 AT HIGH RISK FOR BREAST CANCER: Primary | ICD-10-CM

## 2019-10-14 NOTE — TELEPHONE ENCOUNTER
Returned call to  regarding message left needing Breast MRI . Patient last seen by Jackie in 10/15/18 . Patient is due for Breast MRI family Hx Breast Cancer.  was just seen by her PCP  who told patient she needed her Breast MRI . Message will be sent to Js Batista NP to see if she will place the order for patients MRI or if she needs to be seen since she just had visit with PCP and Breast Exam.

## 2019-10-14 NOTE — TELEPHONE ENCOUNTER
----- Message from Lorin Mercado sent at 10/14/2019  3:55 PM CDT -----  Contact: Pt    Name of Who is Calling:ABDULAZIZ ACKERMAN [[id number]]  What is the request in detail: Patient states she needs orders for breast  MRI  ...Please contact to further discuss and advise      Can the clinic reply by [facility]: NO    What [facility] to [facility]: [phone]

## 2019-10-15 ENCOUNTER — HOSPITAL ENCOUNTER (OUTPATIENT)
Dept: RADIOLOGY | Facility: HOSPITAL | Age: 59
Discharge: HOME | End: 2019-10-15
Attending: PHYSICIAN ASSISTANT
Payer: COMMERCIAL

## 2019-10-15 ENCOUNTER — OFFICE VISIT (OUTPATIENT)
Dept: SPORTS MEDICINE | Facility: CLINIC | Age: 59
End: 2019-10-15
Payer: COMMERCIAL

## 2019-10-15 VITALS
HEART RATE: 76 BPM | SYSTOLIC BLOOD PRESSURE: 108 MMHG | DIASTOLIC BLOOD PRESSURE: 72 MMHG | HEIGHT: 64 IN | WEIGHT: 140 LBS | BODY MASS INDEX: 23.9 KG/M2

## 2019-10-15 DIAGNOSIS — M25.562 LEFT ANTERIOR KNEE PAIN: ICD-10-CM

## 2019-10-15 DIAGNOSIS — M17.0 PRIMARY OSTEOARTHRITIS OF BOTH KNEES: Primary | ICD-10-CM

## 2019-10-15 PROCEDURE — 3008F BODY MASS INDEX DOCD: CPT | Mod: CPTII,S$GLB,, | Performed by: PHYSICIAN ASSISTANT

## 2019-10-15 PROCEDURE — 99999 PR PBB SHADOW E&M-EST. PATIENT-LVL IV: ICD-10-PCS | Mod: PBBFAC,,, | Performed by: PHYSICIAN ASSISTANT

## 2019-10-15 PROCEDURE — 97110 THERAPEUTIC EXERCISES: CPT | Mod: S$GLB,,, | Performed by: PHYSICIAN ASSISTANT

## 2019-10-15 PROCEDURE — 73564 X-RAY EXAM KNEE 4 OR MORE: CPT | Mod: TC,50

## 2019-10-15 PROCEDURE — 3008F PR BODY MASS INDEX (BMI) DOCUMENTED: ICD-10-PCS | Mod: CPTII,S$GLB,, | Performed by: PHYSICIAN ASSISTANT

## 2019-10-15 PROCEDURE — 99999 PR PBB SHADOW E&M-EST. PATIENT-LVL IV: CPT | Mod: PBBFAC,,, | Performed by: PHYSICIAN ASSISTANT

## 2019-10-15 PROCEDURE — 73564 X-RAY EXAM KNEE 4 OR MORE: CPT | Mod: 26,,, | Performed by: RADIOLOGY

## 2019-10-15 PROCEDURE — 73564 XR KNEE ORTHO BILAT WITH FLEXION: ICD-10-PCS | Mod: 26,,, | Performed by: RADIOLOGY

## 2019-10-15 PROCEDURE — 99204 PR OFFICE/OUTPT VISIT, NEW, LEVL IV, 45-59 MIN: ICD-10-PCS | Mod: S$GLB,,, | Performed by: PHYSICIAN ASSISTANT

## 2019-10-15 PROCEDURE — 99204 OFFICE O/P NEW MOD 45 MIN: CPT | Mod: S$GLB,,, | Performed by: PHYSICIAN ASSISTANT

## 2019-10-15 PROCEDURE — 97110 PR THERAPEUTIC EXERCISES: ICD-10-PCS | Mod: S$GLB,,, | Performed by: PHYSICIAN ASSISTANT

## 2019-10-15 RX ORDER — MELOXICAM 15 MG/1
15 TABLET ORAL DAILY
Qty: 30 TABLET | Refills: 2 | Status: SHIPPED | OUTPATIENT
Start: 2019-10-15 | End: 2021-04-22

## 2019-10-15 NOTE — TELEPHONE ENCOUNTER
Called pt and LVM stating :      Tatiana, this is Greta I've received your request I'm returning your call from Dr.Blessey ralph at [facility]. I just wanted to let you know that your breast MRI has been signed/.    **please schedule breast MRI or have pt call at [phone]**      Message has been sent via my chart '

## 2019-10-15 NOTE — LETTER
October 15, 2019      Lily Stoddard MD  [address]  [facility] [address]           [facility]  [address]  [facility] [address]  Phone: [phone]          Patient: Xochitl Weiss   MR Number: [id number]   YOB: 1960   Date of Visit: 10/15/2019       Dear Dr. Lily Stoddard:    Thank you for referring Xochitl Weiss to me for evaluation. Attached you will find relevant portions of my assessment and plan of care.    If you have questions, please do not hesitate to call me. I look forward to following Xochitl Weiss along with you.    Sincerely,    Jasper Taylor PA-C    Enclosure  CC:  No Recipients    If you would like to receive this communication electronically, please contact [email or web address] or [phone] to request more information on [employer] Link access.    For providers and/or their staff who would like to refer a patient to [facility], please contact us through our one-stop-shop provider referral line, [facility], at [phone].    If you feel you have received this communication in error or would no longer like to receive these types of communications, please e-mail [email or web address]

## 2019-10-15 NOTE — PROGRESS NOTES
"CC: Left knee pain    58 y.o. Female with a history of intermittent left knee pain and swelling x 2 years. Pain is worse with kneeling, walking down stairs, and playing golf. She has applied ice to knee with some improvement of pain. She has not taken any medication for this yet.  She recently saw PCP who suggested that she have knee pain further evaluated.    She reports that she has recently developed some "tingling" to her lateral thigh for past few weeks. She does report some chronic lower back "soreness."    No mechanical symptoms, no instability    Is affecting ADLs.  Pain is 5/10 at it's worst, 1/10 now    REVIEW OF SYSTEMS:   Constitution: Negative. Negative for chills, fever and night sweats.   HENT: Negative for congestion and headaches.    Eyes: Negative for blurred vision, left vision loss and right vision loss.   Cardiovascular: Negative for chest pain and syncope.   Respiratory: Negative for cough and shortness of breath.    Endocrine: Negative for polydipsia, polyphagia and polyuria.   Hematologic/Lymphatic: Negative for bleeding problem. Does not bruise/bleed easily.   Skin: Negative for dry skin, itching and rash.   Musculoskeletal: Negative for falls. Positive for left knee pain and  muscle weakness.   Gastrointestinal: Negative for abdominal pain and bowel incontinence.   Genitourinary: Negative for bladder incontinence and nocturia.   Neurological: Negative for disturbances in coordination, loss of balance and seizures.   Psychiatric/Behavioral: Negative for depression. The patient does not have insomnia.    Allergic/Immunologic: Negative for hives and persistent infections.     PAST MEDICAL HISTORY:   Past Medical History:   Diagnosis Date    BRCA negative     Colon polyp     Diverticula of colon     Diverticulitis     Family history of breast cancer     mother    Family history of colon cancer     2 family members over age 60    General anesthetics causing adverse effect in therapeutic " use     per patient report slow to awaken    Genetic testing     negative Integrated BRACAnalysis    GERD (gastroesophageal reflux disease)     Kidney stones     Mild hyperlipidemia        PAST SURGICAL HISTORY:   Past Surgical History:   Procedure Laterality Date    breast augmentation      BREAST LUMPECTOMY      below right breast    BREAST SURGERY       SECTION      x 2    COLONOSCOPY      COLONOSCOPY N/A 3/8/2017    Procedure: COLONOSCOPY;  Surgeon: Torey Macias MD;  Location: [address]);  Service: Endoscopy;  Laterality: N/A;    EYE SURGERY      blephroplasty    HYSTERECTOMY      AUB    OOPHORECTOMY      TRIGGER FINGER RELEASE Right 2017    thumb and 3rd digit    TUBAL LIGATION         FAMILY HISTORY:   Family History   Problem Relation Age of Onset    Colon cancer Father     Cancer Father         colon cancer    Breast cancer Mother 68    Colon cancer Mother 70        twice    Uterine cancer Mother     Diabetes Mother     Hypertension Mother     Cancer Mother         colon    Ovarian cancer Maternal Aunt     Lung cancer Maternal Uncle     No Known Problems Daughter     No Known Problems Son      labor Neg Hx     Eclampsia Neg Hx        SOCIAL HISTORY:   Social History     Socioeconomic History    Marital status:      Spouse name: Not on file    Number of children: 2    Years of education: Not on file    Highest education level: Not on file   Occupational History    Occupation:       Employer: [employer]     Comment: caretaker for parents   Social Needs    Financial resource strain: Not hard at all    Food insecurity:     Worry: Never true     Inability: Never true    Transportation needs:     Medical: No     Non-medical: No   Tobacco Use    Smoking status: Never Smoker    Smokeless tobacco: Never Used   Substance and Sexual Activity    Alcohol use: Yes     Alcohol/week: 0.0 standard drinks     Frequency:  "2-3 times a week     Drinks per session: 1 or 2     Binge frequency: Never     Comment: socially    Drug use: No    Sexual activity: Yes     Partners: Male     Birth control/protection: None, See Surgical Hx     Comment: hysterectomy   Lifestyle    Physical activity:     Days per week: 3 days     Minutes per session: 90 min    Stress: Not at all   Relationships    Social connections:     Talks on phone: More than three times a week     Gets together: Twice a week     Attends Bahai service: Not on file     Active member of club or organization: Yes     Attends meetings of clubs or organizations: More than 4 times per year     Relationship status:    Other Topics Concern    Not on file   Social History Narrative    Not on file       MEDICATIONS:     Current Outpatient Medications:     bimatoprost (LATISSE) 0.03 % ophthalmic solution, Place 1 application into both eyes every evening. Place one drop on applicator and apply evenly along the skin of the upper eyelid at base of eyelashes once daily at bedtime; repeat procedure for second eye (use a clean applicator)., Disp: 5 mL, Rfl: 12    estradiol (ESTRACE) 0.01 % (0.1 mg/gram) vaginal cream, Place 1 g vaginally once daily., Disp: 42.5 g, Rfl: 4    FLUCELVAX QUAD 8513-4420, PF, 60 mcg (15 mcg x 4)/0.5 mL Syrg, ADM 0.5ML IM UTD, Disp: , Rfl: 0    fluticasone (FLONASE) 50 mcg/actuation nasal spray, 1 spray (50 mcg total) by Each Nare route once daily., Disp: 1 Bottle, Rfl: 0    mupirocin (BACTROBAN) 2 % ointment, APPLY TO AFFECTED AREA TWICE A DAY FOR 3 WEEKS, Disp: , Rfl: 1    pantoprazole (PROTONIX) 20 MG tablet, Take 1 tablet (20 mg total) by mouth before breakfast., Disp: 90 tablet, Rfl: 3    ALLERGIES:   Review of patient's allergies indicates:  No Known Allergies    VITAL SIGNS:   /72   Pulse 76   Ht 5' 4" (1.626 m)   Wt 63.5 kg (140 lb)   BMI 24.03 kg/m²      PHYSICAL EXAMINATION:  General:  The patient is alert and oriented x 3.  " Mood is pleasant.  Observation of ears, eyes and nose reveal no gross abnormalities.  No labored breathing observed.    LEFT KNEE EXAMINATION     OBSERVATION / INSPECTION   Gait:   Nonantalgic   Alignment:  Neutral   Scars:   None   Muscle atrophy: Mild  Effusion:  None   Warmth:  None   Discoloration:   none     TENDERNESS / CREPITUS (T / C):          T / C      T / C   Patella   +/ +   Lateral joint line   - / -   Peripatellar medial  -  Medial joint line    - / -   Peripatellar lateral +  Medial plica   - / -   Patellar tendon -   Popliteal fossa   - / -   Quad tendon   -   Gastrocnemius   -   Prepatellar Bursa - / -   Quadricep   -   Tibial tubercle  -  Thigh/hamstring  -   Pes anserine/HS -  Fibula    -   ITB   - / -  Tibia     -   Tib/fib joint  - / -  LCL    -     MFC   - / -   MCL: Proximal  -    LFC   - / -    Distal   -          ROM: (* = pain)  PASSIVE   ACTIVE    Left :   5 / 0 / 145   5 / 0 / 145     Right :    5 / 0 / 145   5 / 0 / 145    Patellofemoral examination:  See above noted areas of tenderness.   Patella position    Subluxation / dislocation: Centered           Sup. / Inf;   Normal   Crepitus (PF):    +   Patellar Mobility:       Medial-lateral:   Normal    Superior-inferior:  Normal    Inferior tilt   Normal    Patellar tendon:  Normal   Lateral tilt:    Normal   J-sign:     None   Patellofemoral grind:   + pain       MENISCAL SIGNS:     Pain on terminal extension:  -  Pain on terminal flexion:  -  Stefanias maneuver:  -   Squat     -    LIGAMENT EXAMINATION:  ACL / Lachman:  normal (-1 to 2mm)    PCL-Post.  drawer: normal 0 to 2mm  MCL- Valgus:  normal 0 to 2mm  LCL- Varus:  normal 0 to 2mm  Pivot shift: normal (Equal)   Dial Test: difference c/w other side   At 30° flexion: normal (< 5°)    At 90° flexion: normal (< 5°)   Reverse Pivot Shift:   normal (Equal)     STRENGTH: (* = with pain) PAINFUL SIDE   Quadricep   5/5   Hamstrin/5    EXTREMITY NEURO-VASCULAR EXAMINATION:    Sensation:  Grossly intact to light touch all dermatomal regions.   Motor Function:  Fully intact motor function at hip, knee, foot and ankle    DTRs;  quadriceps and  achilles 2+.  No clonus and downgoing Babinski.    Vascular status:  DP and PT pulses 2+, brisk capillary refill, symmetric.         IMAGING:    X-rays including standing, weight bearing AP and flexion bilateral knees, lateral and merchant views ordered and images reviewed by me show:    No fracture, dislocation or other pathology mild DJD   Medial compartment: no degenerative changes   Lateral compartment: no degenerative changes   Patellofemoral compartment: no degenerative changes        ASSESSMENT:      ICD-10-CM ICD-9-CM   1. Primary osteoarthritis of both knees M17.0 715.16   2. Left anterior knee pain M25.562 719.46         PLAN:   I made the decision to obtain old records of the patient including previous notes and imaging. I independently reviewed and interpreted lab results today as well as prior imaging.      1. Mobic 15 mg 1 time daily PRN for pain management. Patient understands to take with food and/or OTC prilosec to decrease GI side effects.  2. HEP 99051 - Felice Taylor PA-C and Rhonda Owens PA-C, instructed and demonstrated a patellofemoral HEP. The patient then demonstrated understanding of exercises and proper technique. This program was performed for 10 minutes.   3. Ice compress to the affected area 2-3x a day for 15-20 minutes as needed for pain management.  4. RTC to see Felice Taylor PA-C in 8 weeks for follow-up.      All of the patient's questions were answered and the patient will contact us if they have any questions or concerns in the interim.          All questions were answered, pt will contact us for questions or concerns in the interim.

## 2019-10-17 ENCOUNTER — PATIENT OUTREACH (OUTPATIENT)
Dept: ADMINISTRATIVE | Facility: HOSPITAL | Age: 59
End: 2019-10-17

## 2019-10-21 ENCOUNTER — PATIENT MESSAGE (OUTPATIENT)
Dept: INTERNAL MEDICINE | Facility: CLINIC | Age: 59
End: 2019-10-21

## 2019-10-21 ENCOUNTER — HOSPITAL ENCOUNTER (OUTPATIENT)
Dept: RADIOLOGY | Facility: HOSPITAL | Age: 59
Discharge: HOME | End: 2019-10-21
Attending: INTERNAL MEDICINE
Payer: COMMERCIAL

## 2019-10-21 DIAGNOSIS — Z91.89 AT HIGH RISK FOR BREAST CANCER: ICD-10-CM

## 2019-10-21 DIAGNOSIS — T85.43XA BREAST IMPLANT RUPTURE, INITIAL ENCOUNTER: Primary | ICD-10-CM

## 2019-10-21 PROCEDURE — 77049 MRI BREAST C-+ W/CAD BI: CPT | Mod: 26,,, | Performed by: RADIOLOGY

## 2019-10-21 PROCEDURE — A9577 INJ MULTIHANCE: HCPCS | Performed by: INTERNAL MEDICINE

## 2019-10-21 PROCEDURE — 77049 MRI BREAST C-+ W/CAD BI: CPT | Mod: TC

## 2019-10-21 PROCEDURE — 25500020 PHARM REV CODE 255: Performed by: INTERNAL MEDICINE

## 2019-10-21 PROCEDURE — 77049 MRI BREAST W/WO CONTRAST, W/CAD, BILATERAL: ICD-10-PCS | Mod: 26,,, | Performed by: RADIOLOGY

## 2019-10-21 RX ADMIN — GADOBENATE DIMEGLUMINE 14 ML: 529 INJECTION, SOLUTION INTRAVENOUS at 11:10

## 2019-10-22 ENCOUNTER — PATIENT MESSAGE (OUTPATIENT)
Dept: SURGERY | Facility: CLINIC | Age: 59
End: 2019-10-22

## 2019-10-22 ENCOUNTER — PATIENT MESSAGE (OUTPATIENT)
Dept: INTERNAL MEDICINE | Facility: CLINIC | Age: 59
End: 2019-10-22

## 2019-10-22 DIAGNOSIS — Z80.0 FAMILY HISTORY OF COLON CANCER: Primary | ICD-10-CM

## 2019-10-23 ENCOUNTER — TELEPHONE (OUTPATIENT)
Dept: PLASTIC SURGERY | Facility: CLINIC | Age: 59
End: 2019-10-23

## 2019-10-23 ENCOUNTER — PATIENT MESSAGE (OUTPATIENT)
Dept: INTERNAL MEDICINE | Facility: CLINIC | Age: 59
End: 2019-10-23

## 2019-10-23 ENCOUNTER — TELEPHONE (OUTPATIENT)
Dept: SURGERY | Facility: CLINIC | Age: 59
End: 2019-10-23

## 2019-10-23 ENCOUNTER — TELEPHONE (OUTPATIENT)
Dept: ENDOSCOPY | Facility: HOSPITAL | Age: 59
End: 2019-10-23

## 2019-10-23 DIAGNOSIS — Z12.11 SPECIAL SCREENING FOR MALIGNANT NEOPLASMS, COLON: Primary | ICD-10-CM

## 2019-10-23 RX ORDER — SODIUM, POTASSIUM,MAG SULFATES 17.5-3.13G
SOLUTION, RECONSTITUTED, ORAL ORAL
Qty: 1 BOTTLE | Refills: 0 | Status: SHIPPED | OUTPATIENT
Start: 2019-10-23 | End: 2021-04-22

## 2019-10-23 NOTE — TELEPHONE ENCOUNTER
Spoke with pt she stated her message was intended for plastics and she has since spoken with someone ----- Message from Divina Jimenez MA sent at 10/23/2019  3:01 PM CDT -----  Contact: Self  Please call and see what she needs. According to her last message with Letitia she needed to see a plastic surgeon.    Thanks  Divina  ----- Message -----  From: Holly Alicia  Sent: 10/23/2019   2:57 PM CDT  To: Godfrey DENNIS Staff    Pt is calling to speak with Staff regarding several appts that need to be scheduled on her behalf.  She has questions prior to being scheduled.    She can be reached at [phone].    Thank you.

## 2019-10-23 NOTE — TELEPHONE ENCOUNTER
spoke with pt and got her scheduled for an earlier appt. She verbalized understanding of time , date, and location of scheduled appt.               ----- Message from Claude Nugent RN sent at 10/23/2019  4:41 PM CDT -----  Contact: Pt      ----- Message -----  From: Matty Oneil  Sent: 10/23/2019   4:22 PM CDT  To: Nati Erazo Staff    Type:  Needs Medical Advice    Who Called: The Pt would like to be called if there are any cancellations on between now and 10/29/19.  Please contact the Pt if anything becomes available since she is going out of town on 10/30/19    Best Call Back Number: [phone]

## 2019-10-28 ENCOUNTER — OFFICE VISIT (OUTPATIENT)
Dept: PLASTIC SURGERY | Facility: CLINIC | Age: 59
End: 2019-10-28
Payer: COMMERCIAL

## 2019-10-28 VITALS
HEART RATE: 67 BPM | BODY MASS INDEX: 24.07 KG/M2 | SYSTOLIC BLOOD PRESSURE: 124 MMHG | DIASTOLIC BLOOD PRESSURE: 79 MMHG | WEIGHT: 140.19 LBS

## 2019-10-28 DIAGNOSIS — Z91.89 AT HIGH RISK FOR BREAST CANCER: Primary | ICD-10-CM

## 2019-10-28 DIAGNOSIS — T85.43XA RUPTURED SILICONE BREAST IMPLANT, INITIAL ENCOUNTER: ICD-10-CM

## 2019-10-28 PROCEDURE — 99203 OFFICE O/P NEW LOW 30 MIN: CPT | Mod: S$GLB,,, | Performed by: SURGERY

## 2019-10-28 PROCEDURE — 99203 PR OFFICE/OUTPT VISIT, NEW, LEVL III, 30-44 MIN: ICD-10-PCS | Mod: S$GLB,,, | Performed by: SURGERY

## 2019-10-28 PROCEDURE — 99999 PR PBB SHADOW E&M-EST. PATIENT-LVL III: ICD-10-PCS | Mod: PBBFAC,,, | Performed by: SURGERY

## 2019-10-28 PROCEDURE — 99999 PR PBB SHADOW E&M-EST. PATIENT-LVL III: CPT | Mod: PBBFAC,,, | Performed by: SURGERY

## 2019-10-28 PROCEDURE — 3008F PR BODY MASS INDEX (BMI) DOCUMENTED: ICD-10-PCS | Mod: CPTII,S$GLB,, | Performed by: SURGERY

## 2019-10-28 PROCEDURE — 3008F BODY MASS INDEX DOCD: CPT | Mod: CPTII,S$GLB,, | Performed by: SURGERY

## 2019-10-28 NOTE — LETTER
[facility]  [address], [address]  [facility] [address]  Phone: [phone]  Fax: [phone] November 7, 2019      Lily Stoddard MD  [address]  [address]  [facility] [address]    Patient: Xochitl Weiss   MR Number: [id number]   YOB: 1960   Date of Visit: 10/28/2019     Dear Dr. Lily Stoddard:    Thank you for referring Xochitl Weiss to me for evaluation. Attached you will find relevant portions of my assessment and plan of care.    Ms. Weiss presents to discuss management of her ruptured silicone gel implants.  She had them placed more than 30 years ago.  Was getting annual breast screening with MRI because of dense breasts.  Last year, possible intracapsular rupture was reported for the first time. She cannot recall an inciting event.  Says that she has no pain, is pleased with her result and is not seeking any surgery unless it is medically indicated.  She has no prior or current history of recurrent swelling or fluid appreciable in either breast.  She is in her usual state of health and does not feel tired.  She has no personal history of connective tissue disorder.       On examination, she has good symmetry between the implant sides.  There are grade 1 capsules.  She has no adenopathy or obvious skin changes.       I reviewed her most recent MRI results personally and found that there is no evidence of extracapsular rupture.  There are folding signs of the implant and it was read as having evidence of intracapsular rupture.  This was not read on her previous scans.  Her implants are in the subpectoral position     It is unclear whether she will have further MRI screening.       I discussed how to proceed with her.  I gave her the option to have the implants replaced and she states that she does not wish to do so unless it is medically indicated.  I explained to her that removal of her gel implants and replacement with prostheses is not unreasonable  but comes with risks.  One risk is the risk of early capsular contracture.  The risk of leaving her implants in is that she could have further damage to the prostheses or leaking of gel outside of the capsule in to the breast.  I explained to her that replacement of the prostheses would likely carry out of pocket medical expenses, in particular to pay for new implants.  The safest approach is to do this in a staged fashion.  Capsulectomy and implant removal could be submitted to insurance for coverage.       I advise that she watch out for seroma, new capsular contracture, and continue her annual screening.       She will consider her options and can return for a consultation at any point in the future.       Her medical intake form will be uploaded into the computer.     I submitted images to medical photography.       40 minutes was spend with patient, more than 50% was spent explaining her options for management of ruptured gel prosthesis as outlined above.      If you have questions, please do not hesitate to call me. I look forward to following Xochitl Weiss along with you.    Sincerely,    Castro Damian MD  Section of Plastic & Reconstructive Surgery  Department of Surgery  [facility]    [email or web address]

## 2019-11-07 ENCOUNTER — PATIENT MESSAGE (OUTPATIENT)
Dept: INTERNAL MEDICINE | Facility: CLINIC | Age: 59
End: 2019-11-07

## 2019-11-22 ENCOUNTER — ANESTHESIA EVENT (OUTPATIENT)
Dept: ENDOSCOPY | Facility: HOSPITAL | Age: 59
End: 2019-11-22
Payer: COMMERCIAL

## 2019-11-22 ENCOUNTER — ANESTHESIA (OUTPATIENT)
Dept: ENDOSCOPY | Facility: HOSPITAL | Age: 59
End: 2019-11-22
Payer: COMMERCIAL

## 2019-11-22 ENCOUNTER — HOSPITAL ENCOUNTER (OUTPATIENT)
Facility: HOSPITAL | Age: 59
Discharge: HOME | End: 2019-11-22
Attending: INTERNAL MEDICINE | Admitting: INTERNAL MEDICINE
Payer: COMMERCIAL

## 2019-11-22 VITALS
WEIGHT: 136 LBS | BODY MASS INDEX: 23.22 KG/M2 | OXYGEN SATURATION: 99 % | HEART RATE: 77 BPM | TEMPERATURE: 98 F | HEIGHT: 64 IN | RESPIRATION RATE: 18 BRPM | DIASTOLIC BLOOD PRESSURE: 72 MMHG | SYSTOLIC BLOOD PRESSURE: 111 MMHG

## 2019-11-22 DIAGNOSIS — Z80.0 FAMILY HISTORY OF COLON CANCER IN FATHER: ICD-10-CM

## 2019-11-22 PROCEDURE — 63600175 PHARM REV CODE 636 W HCPCS: Performed by: INTERNAL MEDICINE

## 2019-11-22 PROCEDURE — 37000008 HC ANESTHESIA 1ST 15 MINUTES: Performed by: INTERNAL MEDICINE

## 2019-11-22 PROCEDURE — G0105 COLORECTAL SCRN; HI RISK IND: HCPCS | Performed by: INTERNAL MEDICINE

## 2019-11-22 PROCEDURE — E9220 PRA ENDO ANESTHESIA: HCPCS | Mod: ,,, | Performed by: NURSE ANESTHETIST, CERTIFIED REGISTERED

## 2019-11-22 PROCEDURE — 63600175 PHARM REV CODE 636 W HCPCS: Performed by: NURSE ANESTHETIST, CERTIFIED REGISTERED

## 2019-11-22 PROCEDURE — 37000009 HC ANESTHESIA EA ADD 15 MINS: Performed by: INTERNAL MEDICINE

## 2019-11-22 PROCEDURE — G0105 COLORECTAL SCRN; HI RISK IND: HCPCS | Mod: ,,, | Performed by: INTERNAL MEDICINE

## 2019-11-22 PROCEDURE — 25000003 PHARM REV CODE 250: Performed by: NURSE ANESTHETIST, CERTIFIED REGISTERED

## 2019-11-22 PROCEDURE — E9220 PRA ENDO ANESTHESIA: ICD-10-PCS | Mod: ,,, | Performed by: NURSE ANESTHETIST, CERTIFIED REGISTERED

## 2019-11-22 PROCEDURE — G0105 COLORECTAL SCRN; HI RISK IND: ICD-10-PCS | Mod: ,,, | Performed by: INTERNAL MEDICINE

## 2019-11-22 RX ORDER — SODIUM CHLORIDE 0.9 % (FLUSH) 0.9 %
10 SYRINGE (ML) INJECTION
Status: DISCONTINUED | OUTPATIENT
Start: 2019-11-22 | End: 2019-11-22 | Stop reason: HOSPADM

## 2019-11-22 RX ORDER — LIDOCAINE HYDROCHLORIDE 20 MG/ML
INJECTION, SOLUTION EPIDURAL; INFILTRATION; INTRACAUDAL; PERINEURAL
Status: DISCONTINUED | OUTPATIENT
Start: 2019-11-22 | End: 2019-11-22

## 2019-11-22 RX ORDER — PROPOFOL 10 MG/ML
VIAL (ML) INTRAVENOUS
Status: DISCONTINUED | OUTPATIENT
Start: 2019-11-22 | End: 2019-11-22

## 2019-11-22 RX ORDER — PROPOFOL 10 MG/ML
INJECTION, EMULSION INTRAVENOUS CONTINUOUS PRN
Status: DISCONTINUED | OUTPATIENT
Start: 2019-11-22 | End: 2019-11-22

## 2019-11-22 RX ORDER — SODIUM CHLORIDE 9 MG/ML
INJECTION, SOLUTION INTRAVENOUS CONTINUOUS
Status: DISCONTINUED | OUTPATIENT
Start: 2019-11-22 | End: 2019-11-22 | Stop reason: HOSPADM

## 2019-11-22 RX ADMIN — SODIUM CHLORIDE: 0.9 INJECTION, SOLUTION INTRAVENOUS at 04:11

## 2019-11-22 RX ADMIN — PROPOFOL 150 MCG/KG/MIN: 10 INJECTION, EMULSION INTRAVENOUS at 04:11

## 2019-11-22 RX ADMIN — PROPOFOL 30 MG: 10 INJECTION, EMULSION INTRAVENOUS at 04:11

## 2019-11-22 RX ADMIN — LIDOCAINE HYDROCHLORIDE 100 MG: 20 INJECTION, SOLUTION EPIDURAL; INFILTRATION; INTRACAUDAL; PERINEURAL at 04:11

## 2019-11-22 NOTE — H&P
Short Stay Endoscopy History and Physical    PCP - Lily Stoddard MD    Procedure - Colonoscopy  ASA - per anesthesia  Mallampati - per anesthesia  History of Anesthesia problems - no  Family history Anesthesia problems - no   Plan of anesthesia - General    HPI:  This is a 59 y.o. female here for screening colonoscopy. Father with colon cancer at 68. Mother with colon cancer at 70. Mother with uterine cancer at 30, mother with breast cancer at 68. Mother's sister with ovarian cancer at 60, mother's brother with lung cancer around 60 years of age. Stephen testing was negative for patient.      ROS:  Constitutional: No fevers, chills, No weight loss  CV: No chest pain  Pulm: No cough, No shortness of breath  GI: No abdominal pain, blood in stool  Derm: No rash    Medical History:  has a past medical history of BRCA negative, Colon polyp, Diverticula of colon, Diverticulitis, Family history of breast cancer, Family history of colon cancer, General anesthetics causing adverse effect in therapeutic use, Genetic testing (), GERD (gastroesophageal reflux disease), Kidney stones, and Mild hyperlipidemia.    Surgical History:  has a past surgical history that includes Breast lumpectomy; breast augmentation; Oophorectomy; Tubal ligation;  section; Colonoscopy; Colonoscopy (N/A, 3/8/2017); Trigger finger release (Right, 2017); Hysterectomy; Breast surgery; and Eye surgery.    Family History: family history includes Breast cancer (age of onset: 68) in her mother; Cancer in her father and mother; Colon cancer in her father; Colon cancer (age of onset: 70) in her mother; Diabetes in her mother; Hypertension in her mother; Lung cancer in her maternal uncle; No Known Problems in her daughter and son; Ovarian cancer in her maternal aunt; Uterine cancer in her mother.. Otherwise no colon cancer, inflammatory bowel disease, or GI malignancies.    Social History:  reports that she has never smoked. She has never  used smokeless tobacco. She reports that she drinks alcohol. She reports that she does not use drugs.    Review of patient's allergies indicates:  No Known Allergies    Medications:   Medications Prior to Admission   Medication Sig Dispense Refill Last Dose    bimatoprost (LATISSE) 0.03 % ophthalmic solution Place 1 application into both eyes every evening. Place one drop on applicator and apply evenly along the skin of the upper eyelid at base of eyelashes once daily at bedtime; repeat procedure for second eye (use a clean applicator). 5 mL 12 Past Week at Unknown time    estradiol (ESTRACE) 0.01 % (0.1 mg/gram) vaginal cream Place 1 g vaginally once daily. 42.5 g 4 Past Week at Unknown time    meloxicam (MOBIC) 15 MG tablet Take 1 tablet (15 mg total) by mouth once daily. 30 tablet 2 Past Week at Unknown time    FLUCELVAX QUAD 9556-9909, PF, 60 mcg (15 mcg x 4)/0.5 mL Syrg ADM 0.5ML IM UTD  0 Taking    fluticasone (FLONASE) 50 mcg/actuation nasal spray 1 spray (50 mcg total) by Each Nare route once daily. 1 Bottle 0 More than a month at Unknown time    mupirocin (BACTROBAN) 2 % ointment APPLY TO AFFECTED AREA TWICE A DAY FOR 3 WEEKS  1 Unknown at Unknown time    pantoprazole (PROTONIX) 20 MG tablet Take 1 tablet (20 mg total) by mouth before breakfast. 90 tablet 3 Taking    sodium,potassium,mag sulfates (SUPREP BOWEL PREP KIT) 17.5-3.13-1.6 gram SolR As Directed 1 Bottle 0          Physical Exam:    Vital Signs:   Vitals:    11/22/19 1452   BP: 124/81   Pulse: 84   Resp: 16   Temp: 98.2 °F (36.8 °C)       General Appearance: Well appearing in no acute distress  Eyes:    No scleral icterus  ENT: Neck supple, Lips, mucosa, and tongue normal; teeth and gums normal  Lungs: CTA bilaterally  Heart:  S1, S2 normal, no murmurs heard  Abdomen: Soft, non tender, non distended with positive bowel sounds. No hepatosplenomegaly, ascites, or mass.  Extremities: 2+ pulses, no clubbing, cyanosis or edema  Skin: No  rash      Labs:  Lab Results   Component Value Date    WBC 6.57 10/07/2019    HGB 14.3 10/07/2019    HCT 43.0 10/07/2019     10/07/2019    CHOL 255 (H) 10/07/2019    TRIG 116 10/07/2019    HDL 83 (H) 10/07/2019    ALT 18 10/07/2019    AST 21 10/07/2019     10/07/2019    K 4.2 10/07/2019     10/07/2019    CREATININE 0.8 10/07/2019    BUN 13 10/07/2019    CO2 27 10/07/2019    TSH 3.264 10/07/2019    INR 1.0 05/29/2017    HGBA1C 5.6 04/06/2017       I have explained the risks and benefits of endoscopy procedures to the patient including but not limited to bleeding, perforation, infection, and death.  The patient was asked if they understand and allowed to ask any further questions to their satisfaction.     Ricardo Perera MD PGY-VI  Gastroenterology Fellow  [facility]

## 2019-11-22 NOTE — ANESTHESIA POSTPROCEDURE EVALUATION
Anesthesia Post Evaluation    Patient: Xochitl Weiss    Procedure(s) Performed: Procedure(s) (LRB):  COLONOSCOPY (N/A)    Final Anesthesia Type: general    Patient location during evaluation: GI PACU  Patient participation: Yes- Able to Participate  Level of consciousness: awake and alert  Post-procedure vital signs: reviewed and stable  Pain management: adequate  Airway patency: patent    PONV status at discharge: No PONV  Anesthetic complications: no      Cardiovascular status: stable  Respiratory status: spontaneous ventilation and room air  Hydration status: euvolemic  Follow-up not needed.          Vitals Value Taken Time   /56 11/22/2019  4:49 PM   Temp 36.6 °C (97.9 °F) 11/22/2019  4:49 PM   Pulse 77 11/22/2019  4:49 PM   Resp 17 11/22/2019  4:49 PM   SpO2 98 % 11/22/2019  4:49 PM         No case tracking events are documented in the log.      Pain/Laura Score: Laura Score: 9 (11/22/2019  4:50 PM)

## 2019-11-22 NOTE — [employer] PATIENT INSTRUCTIONS
Discharge Summary/Instructions after an Endoscopic Procedure  Patient Name: Xochitl Weiss  Patient MRN: [id number]  Patient YOB: 1960 Friday, November 22, 2019  Torey Macias MD  RESTRICTIONS:  During your procedure today, you received medications for sedation.  These   medications may affect your judgment, balance and coordination.  Therefore,   for 24 hours, you have the following restrictions:   - DO NOT drive a car, operate machinery, make legal/financial decisions,   sign important papers or drink alcohol.    ACTIVITY:  Today: no heavy lifting, straining or running due to procedural   sedation/anesthesia.  The following day: return to full activity including work.  DIET:  Eat and drink normally unless instructed otherwise.     TREATMENT FOR COMMON SIDE EFFECTS:  - Mild abdominal pain, nausea, belching, bloating or excessive gas:  rest,   eat lightly and use a heating pad.  - Sore Throat: treat with throat lozenges and/or gargle with warm salt   water.  - Because air was used during the procedure, expelling large amounts of air   from your rectum or belching is normal.  - If a bowel prep was taken, you may not have a bowel movement for 1-3 days.    This is normal.  SYMPTOMS TO WATCH FOR AND REPORT TO YOUR PHYSICIAN:  1. Abdominal pain or bloating, other than gas cramps.  2. Chest pain.  3. Back pain.  4. Signs of infection such as: chills or fever occurring within 24 hours   after the procedure.  5. Rectal bleeding, which would show as bright red, maroon, or black stools.   (A tablespoon of blood from the rectum is not serious, especially if   hemorrhoids are present.)  6. Vomiting.  7. Weakness or dizziness.  GO DIRECTLY TO THE NEAREST EMERGENCY ROOM IF YOU HAVE ANY OF THE FOLLOWING:      Difficulty breathing              Chills and/or fever over 101 F   Persistent vomiting and/or vomiting blood   Severe abdominal pain   Severe chest pain   Black, tarry stools   Bleeding- more than one  tablespoon   Any other symptom or condition that you feel may need urgent attention  Your doctor recommends these additional instructions:  If any biopsies were taken, your doctors clinic will contact you in 1 to 2   weeks with any results.  - Discharge patient to home.   - Repeat colonoscopy in 5 years for screening purposes.   - The findings and recommendations were discussed with the patient.   - Return to primary care physician.  For questions, problems or results please call your physician - Torey Macias MD at Work:  [phone].  [facility], EMERGENCY ROOM PHONE NUMBER: [phone]  IF A COMPLICATION OR EMERGENCY SITUATION ARISES AND YOU ARE UNABLE TO REACH   YOUR PHYSICIAN - GO DIRECTLY TO THE EMERGENCY ROOM.  Torey Macias MD  11/22/2019 4:56:29 PM  This report has been verified and signed electronically.  [employer]

## 2019-11-22 NOTE — DISCHARGE INSTRUCTIONS
Colonoscopy     A camera attached to a flexible tube with a viewing lens is used to take video pictures.     Colonoscopy is a test to view the inside of your lower digestive tract (colon and rectum). Sometimes it can show the last part of the small intestine (ileum). During the test, small pieces of tissue may be removed for testing. This is called a biopsy. Small growths, such as polyps, may also be removed.   Why is colonoscopy done?  The test is done to help look for colon cancer. And it can help find the source of abdominal pain, bleeding, and changes in bowel habits. It may be needed once a year, depending on factors such as your:  · Age  · Health history  · Family health history  · Symptoms  · Results from any prior colonoscopy  Risks and possible complications  These include:  · Bleeding               · A puncture or tear in the colon   · Risks of anesthesia  · A cancer lesion not being seen  Getting ready   To prepare for the test:  · Talk with your healthcare provider about the risks of the test (see below). Also ask your healthcare provider about alternatives to the test.  · Tell your healthcare provider about any medicines you take. Also tell him or her about any health conditions you may have.  · Make sure your rectum and colon are empty for the test. Follow the diet and bowel prep instructions exactly. If you dont, the test may need to be rescheduled.  · Plan for a friend or family member to drive you home after the test.     Colonoscopy provides an inside view of the entire colon.     You may discuss the results with your doctor right away or at a future visit.  During the test   The test is usually done in the hospital on an outpatient basis. This means you go home the same day. The procedure takes about 30 minutes. During that time:  · You are given relaxing (sedating) medicine through an IV line. You may be drowsy, or fully asleep.  · The healthcare provider will first give you a physical exam to  check for anal and rectal problems.  · Then the anus is lubricated and the scope inserted.  · If you are awake, you may have a feeling similar to needing to have a bowel movement. You may also feel pressure as air is pumped into the colon. Its OK to pass gas during the procedure.  · Biopsy, polyp removal, or other treatments may be done during the test.  After the test   You may have gas right after the test. It can help to try to pass it to help prevent later bloating. Your healthcare provider may discuss the results with you right away. Or you may need to schedule a follow-up visit to talk about the results. After the test, you can go back to your normal eating and other activities. You may be tired from the sedation and need to rest for a few hours.  Date Last Reviewed: 11/1/2016 © 2000-2017 [employer]. [address] [place] [address], [place] [address]. All rights reserved. This information is not intended as a substitute for professional medical care. Always follow your healthcare professional's instructions.

## 2019-11-22 NOTE — ANESTHESIA PREPROCEDURE EVALUATION
11/22/2019  Xochitl Weiss is a 59 y.o., female.    Anesthesia Evaluation    I have reviewed the Patient Summary Reports.     I have reviewed the Medications.     Review of Systems  Anesthesia Hx:   Denies Personal Hx of Anesthesia complications.   Hematology/Oncology:  Hematology Normal   Oncology Normal     EENT/Dental:EENT/Dental Normal   Cardiovascular:  Cardiovascular Normal     Pulmonary:  Pulmonary Normal    Renal/:  Renal/ Normal     Hepatic/GI:  Hepatic/GI Normal    Musculoskeletal:  Musculoskeletal Normal    Neurological:  Neurology Normal    Endocrine:  Endocrine Normal    Dermatological:  Skin Normal    Psych:  Psychiatric Normal           Physical Exam  General:  Well nourished    Airway/Jaw/Neck:  AIRWAY FINDINGS: Normal      Eyes/Ears/Nose:  EYES/EARS/NOSE FINDINGS: Normal   Dental:  DENTAL FINDINGS: Normal   Chest/Lungs:  Chest/Lungs Clear    Heart/Vascular:  Heart Findings: Normal Heart murmur: negative Vascular Findings: Normal    Abdomen:  Abdomen Findings: Normal    Musculoskeletal:  Musculoskeletal Findings: Normal   Skin:  Skin Findings: Normal    Mental Status:  Mental Status Findings: Normal        Anesthesia Plan  Type of Anesthesia, risks & benefits discussed:  Anesthesia Type:  general  Patient's Preference: general  Intra-op Monitoring Plan: standard ASA monitors  Intra-op Monitoring Plan Comments:   Post Op Pain Control Plan:   Post Op Pain Control Plan Comments:   Induction:   IV  Beta Blocker:  Patient is not currently on a Beta-Blocker (No further documentation required).       Informed Consent: Patient understands risks and agrees with Anesthesia plan.  Questions answered. Anesthesia consent signed with patient.  ASA Score: 2     Day of Surgery Review of History & Physical:    H&P update referred to the surgeon.         Ready For Surgery From Anesthesia Perspective.

## 2019-12-02 ENCOUNTER — TELEPHONE (OUTPATIENT)
Dept: ENDOSCOPY | Facility: HOSPITAL | Age: 59
End: 2019-12-02

## 2019-12-02 ENCOUNTER — TELEPHONE (OUTPATIENT)
Dept: INTERNAL MEDICINE | Facility: CLINIC | Age: 59
End: 2019-12-02

## 2019-12-02 ENCOUNTER — PATIENT MESSAGE (OUTPATIENT)
Dept: INTERNAL MEDICINE | Facility: CLINIC | Age: 59
End: 2019-12-02

## 2019-12-17 ENCOUNTER — LAB VISIT (OUTPATIENT)
Dept: LAB | Facility: OTHER | Age: 59
End: 2019-12-17
Attending: SPECIALIST
Payer: COMMERCIAL

## 2019-12-17 DIAGNOSIS — Z01.818 OTHER SPECIFIED PRE-OPERATIVE EXAMINATION: Primary | ICD-10-CM

## 2019-12-17 LAB
BASOPHILS # BLD AUTO: 0.03 K/UL (ref 0–0.2)
BASOPHILS NFR BLD: 0.5 % (ref 0–1.9)
DIFFERENTIAL METHOD: ABNORMAL
EOSINOPHIL # BLD AUTO: 0.3 K/UL (ref 0–0.5)
EOSINOPHIL NFR BLD: 4.4 % (ref 0–8)
ERYTHROCYTE [DISTWIDTH] IN BLOOD BY AUTOMATED COUNT: 12.4 % (ref 11.5–14.5)
HCT VFR BLD AUTO: 43.9 % (ref 37–48.5)
HGB BLD-MCNC: 13.9 G/DL (ref 12–16)
IMM GRANULOCYTES # BLD AUTO: 0.02 K/UL (ref 0–0.04)
IMM GRANULOCYTES NFR BLD AUTO: 0.3 % (ref 0–0.5)
LYMPHOCYTES # BLD AUTO: 2.1 K/UL (ref 1–4.8)
LYMPHOCYTES NFR BLD: 35.4 % (ref 18–48)
MCH RBC QN AUTO: 28.4 PG (ref 27–31)
MCHC RBC AUTO-ENTMCNC: 31.7 G/DL (ref 32–36)
MCV RBC AUTO: 90 FL (ref 82–98)
MONOCYTES # BLD AUTO: 0.6 K/UL (ref 0.3–1)
MONOCYTES NFR BLD: 9.8 % (ref 4–15)
NEUTROPHILS # BLD AUTO: 2.9 K/UL (ref 1.8–7.7)
NEUTROPHILS NFR BLD: 49.6 % (ref 38–73)
NRBC BLD-RTO: 0 /100 WBC
PLATELET # BLD AUTO: 303 K/UL (ref 150–350)
PLATELET # BLD AUTO: 303 K/UL (ref 150–350)
PMV BLD AUTO: 9.1 FL (ref 9.2–12.9)
PMV BLD AUTO: 9.1 FL (ref 9.2–12.9)
RBC # BLD AUTO: 4.89 M/UL (ref 4–5.4)
WBC # BLD AUTO: 5.9 K/UL (ref 3.9–12.7)

## 2019-12-17 PROCEDURE — 36415 COLL VENOUS BLD VENIPUNCTURE: CPT

## 2019-12-17 PROCEDURE — 85025 COMPLETE CBC W/AUTO DIFF WBC: CPT

## 2020-02-21 ENCOUNTER — OFFICE VISIT (OUTPATIENT)
Dept: URGENT CARE | Facility: CLINIC | Age: 60
End: 2020-02-21
Payer: COMMERCIAL

## 2020-02-21 VITALS
RESPIRATION RATE: 17 BRPM | DIASTOLIC BLOOD PRESSURE: 71 MMHG | HEART RATE: 72 BPM | HEIGHT: 64 IN | WEIGHT: 136 LBS | SYSTOLIC BLOOD PRESSURE: 114 MMHG | BODY MASS INDEX: 23.22 KG/M2 | OXYGEN SATURATION: 98 % | TEMPERATURE: 97 F

## 2020-02-21 DIAGNOSIS — L30.9 DERMATITIS: ICD-10-CM

## 2020-02-21 DIAGNOSIS — S63.501A SPRAIN OF RIGHT WRIST, INITIAL ENCOUNTER: Primary | ICD-10-CM

## 2020-02-21 PROCEDURE — 73110 X-RAY EXAM OF WRIST: CPT | Mod: RT,S$GLB,, | Performed by: RADIOLOGY

## 2020-02-21 PROCEDURE — 99214 PR OFFICE/OUTPT VISIT, EST, LEVL IV, 30-39 MIN: ICD-10-PCS | Mod: S$GLB,,, | Performed by: FAMILY MEDICINE

## 2020-02-21 PROCEDURE — 99214 OFFICE O/P EST MOD 30 MIN: CPT | Mod: S$GLB,,, | Performed by: FAMILY MEDICINE

## 2020-02-21 PROCEDURE — 73110 XR WRIST COMPLETE 3 VIEWS RIGHT: ICD-10-PCS | Mod: RT,S$GLB,, | Performed by: RADIOLOGY

## 2020-02-21 RX ORDER — HYDROCORTISONE 25 MG/G
CREAM TOPICAL 2 TIMES DAILY
Qty: 28 G | Refills: 0 | Status: SHIPPED | OUTPATIENT
Start: 2020-02-21 | End: 2021-04-22

## 2020-02-21 NOTE — PROGRESS NOTES
"Subjective:       Patient ID: Xochitl Weiss is a 59 y.o. female.    Vitals:  height is 5' 4" (1.626 m) and weight is 61.7 kg (136 lb). Her temperature is 97 °F (36.1 °C). Her blood pressure is 114/71 and her pulse is 72. Her respiration is 17 and oxygen saturation is 98%.     Chief Complaint: Wrist Pain    Pt states injury to right wrist this am. Pt states she fell of her car landing on her right side and right wrist. Did not hit head no loc.  Shoulder and elbow improving but her wrist has not improved much. Took ibuprofen.     Wrist Pain    The pain is present in the right wrist. This is a new problem. The current episode started today. The problem occurs constantly. The problem has been unchanged. The quality of the pain is described as aching. The pain is at a severity of 5/10. Pertinent negatives include no fever. The symptoms are aggravated by activity. Treatments tried: ibuprofen. The treatment provided mild relief.       Constitution: Negative for chills, fatigue and fever.   HENT: Negative for congestion and sore throat.    Neck: Negative for painful lymph nodes.   Cardiovascular: Negative for chest pain and leg swelling.   Eyes: Negative for double vision and blurred vision.   Respiratory: Negative for cough and shortness of breath.    Gastrointestinal: Negative for nausea, vomiting and diarrhea.   Genitourinary: Negative for dysuria, frequency, urgency and history of kidney stones.   Musculoskeletal: Positive for joint pain and joint swelling. Negative for muscle cramps and muscle ache.   Skin: Negative for color change, pale, rash and bruising.   Allergic/Immunologic: Negative for seasonal allergies.   Neurological: Negative for dizziness, history of vertigo, light-headedness, passing out and headaches.   Hematologic/Lymphatic: Negative for swollen lymph nodes.   Psychiatric/Behavioral: Negative for nervous/anxious, sleep disturbance and depression. The patient is not nervous/anxious.      "   Objective:      Physical Exam   Constitutional: She is oriented to person, place, and time. She appears well-developed and well-nourished. She is cooperative.  Non-toxic appearance. She does not appear ill. No distress.   HENT:   Head: Normocephalic and atraumatic. Head is without abrasion, without contusion and without laceration.   Right Ear: Hearing, tympanic membrane, external ear and ear canal normal. No hemotympanum.   Left Ear: Hearing, tympanic membrane, external ear and ear canal normal. No hemotympanum.   Nose: Nose normal. No mucosal edema, rhinorrhea or nasal deformity. No epistaxis. Right sinus exhibits no maxillary sinus tenderness and no frontal sinus tenderness. Left sinus exhibits no maxillary sinus tenderness and no frontal sinus tenderness.   Mouth/Throat: Uvula is midline, oropharynx is clear and moist and mucous membranes are normal. No trismus in the jaw. Normal dentition. No uvula swelling. No posterior oropharyngeal erythema.   Eyes: Pupils are equal, round, and reactive to light. Conjunctivae, EOM and lids are normal. Right eye exhibits no discharge. Left eye exhibits no discharge. No scleral icterus.   Neck: Trachea normal, normal range of motion, full passive range of motion without pain and phonation normal. Neck supple. No spinous process tenderness and no muscular tenderness present. No neck rigidity. No tracheal deviation present.   Cardiovascular: Normal rate, regular rhythm, normal heart sounds, intact distal pulses and normal pulses.   Pulmonary/Chest: Effort normal and breath sounds normal. No respiratory distress.   Abdominal: Soft. Normal appearance and bowel sounds are normal. She exhibits no distension, no pulsatile midline mass and no mass. There is no tenderness.   Musculoskeletal: She exhibits no edema or deformity.        Right shoulder: She exhibits normal range of motion, no tenderness, no bony tenderness and no deformity.        Right elbow: She exhibits normal range  of motion, no swelling, no effusion and no laceration. No tenderness found.        Right wrist: She exhibits decreased range of motion, bony tenderness and swelling (mild). She exhibits no effusion and no crepitus.        Hands:  Neg snuff box tenderness  Cap refill 2 seconds, radial pulse 2+, sensation intact  No deformity or wounds.        Neurological: She is alert and oriented to person, place, and time. She has normal strength. No cranial nerve deficit or sensory deficit. She exhibits normal muscle tone. She displays no seizure activity. Coordination normal. GCS eye subscore is 4. GCS verbal subscore is 5. GCS motor subscore is 6.   Skin: Skin is warm, dry, intact, not diaphoretic and not pale. Capillary refill takes less than 2 seconds. abrasion, burn, bruising and ecchymosis  Psychiatric: She has a normal mood and affect. Her speech is normal and behavior is normal. Judgment and thought content normal. Cognition and memory are normal.   Nursing note and vitals reviewed.  X-ray Wrist Complete Right    Result Date: 2/21/2020  EXAMINATION: XR WRIST COMPLETE 3 VIEWS RIGHT CLINICAL HISTORY: Unspecified injury of right wrist, hand and finger(s), initial encounter TECHNIQUE: PA, lateral, and oblique views of the right wrist were performed. COMPARISON: None FINDINGS: Four views right wrist. No acute displaced fracture or dislocation of the wrist.  No radiopaque foreign body.  No significant edema.     1. No acute displaced fracture or dislocation of the wrist. Electronically signed by: Milton Fontanez MD Date:    02/21/2020 Time:    17:39        Assessment:       1. Sprain of right wrist, initial encounter    2. Dermatitis        Plan:         Sprain of right wrist, initial encounter  -     X-Ray Wrist Complete Right; Future; Expected date: 02/21/2020  -     WRIST BRACE FOR HOME USE    Dermatitis  -     hydrocortisone 2.5 % cream; Apply topically 2 (two) times daily. for 7 days  Dispense: 28 g; Refill: 0    f/u prn  worsening sx.     Patient Instructions   PLEASE READ YOUR DISCHARGE INSTRUCTIONS ENTIRELY AS IT CONTAINS IMPORTANT INFORMATION.    Tylenol and ibuprofen for pain    Rest, ice, compress, and elevate at home    Avoid prolonged use of the affected area until better.     Hydrocortisone cream twice daily 5-7 days only    Please see your primary care doctor if you develop new or worsening symptoms.     Please arrange follow up with your primary medical clinic as soon as possible. You must understand that you've received an Urgent Care treatment only and that you may be released before all of your medical problems are known or treated. You, the patient, will arrange for follow up as instructed. If your symptoms worsen or fail to improve you should go to the Emergency Room.    Nonspecific Dermatitis  Dermatitis is a skin rash caused by something that touches the skin and makes it irritated and inflamed.  Your skin may be red, swollen, dry, and may be cracked. Blisters may form and ooze. The rash will itch.  Dermatitis can form on the face and neck, backs of hands, forearms, genitals, and lower legs. Dermatitis is not passed from person to person.  Talk with your health care provider about what may have caused the rash. Common things that cause skin allergies are metal in jewelry, plants like poison ivy or poison oak, and certain skin care products. You will need to avoid the source of your rash in the future to prevent it from coming back. In some cases, the cause of the dermatitis may not be found.  Treatment is done to relieve itching and prevent the rash from coming back. The rash should go away in a few days to a few weeks.  Home care  The health care provider may prescribe medications to relieve swelling and itching. Follow all instructions when using these medications.  · Avoid anything that heats up your skin, such as hot showers or baths, or direct sunlight. This can make itching worse.  · Stay away from whatever  you think caused the rash.  · Bathe in warm, not hot, water. Apply a moisturizing lotion after bathing to prevent dry skin.  · Avoid skin irritants such as wool or silk clothing, grease, oils, harsh soaps, and detergents.  · Apply cold compresses to soothe your sores to help relieve your symptoms. Do this for 30 minutes 3 to 4 times a day. You can make a cold compress by soaking a cloth in cold water. Squeeze out excess water. You can add colloidal oatmeal to the water to help reduce itching. For severe itching in a small area, apply an ice pack wrapped in a thin towel. Do this for 20 minutes 3 to 4 times a day.  · You can also help relieve large areas of itching by taking a lukewarm bath with colloidal oatmeal added to the water.  · Use hydrocortisone cream for redness and irritation, unless another medicine was prescribed. You can also use benzocaine anesthetic cream or spray.  · Use oral diphenhydramine to help reduce itching. This is an antihistamine you can buy at drug and grocery stores. It can make you sleepy, so use lower doses during the daytime. Or you can use loratadine. This is an antihistamine that will not make you sleepy. Dont use diphenhydramine if you have glaucoma or have trouble urinating because of an enlarged prostate.  · Wash your hands or use an antibacterial gel often to prevent the spread of the rash.  Follow-up care  Follow up with your health care provider. Make an appointment with your health care provider if your symptoms do not get better in the next 1 to 2 weeks.  When to seek medical advice  Call your health care provider right away if any of these occur:  · Spreading of the rash to other parts of your body  · Severe swelling of your face, eyelids, mouth, throat or tongue  · Trouble urinating due to swelling in the genital area  · Fever of 100.4°F (38°C) or higher  · Redness or swelling that gets worse  · Pain that gets worse  · Foul-smelling fluid leaking from the  skin  · Yellow-brown crusts on the open blisters  · Joint pain   Date Last Reviewed: 7/23/2014  © 3151-4109 [employer] [address] [place] [address], [place] [address]. All rights reserved. This information is not intended as a substitute for professional medical care. Always follow your healthcare professional's instructions.        Wrist Sprain  A sprain is an injury to the ligaments or capsule that holds a joint together. There are no broken bones. Most sprains take about 3 to 6 weeks to heal. If it a severe sprain where the ligament is completely torn, it can take months to recover.     Most wrist sprains are treated with a splint, wrist brace, or elastic wrap for support. Severe sprains may require surgery.  Home care  · Keep your arm elevated to reduce pain and swelling. This is very important during the first 48 hours.  · Apply an ice pack over the injured area for 15 to 20 minutes every 3 to 6 hours. You should do this for the first 24 to 48 hours. You can make an ice pack by filling a plastic bag that seals at the top with ice cubes and then wrapping it with a thin towel. Continue to use ice packs for relief of pain and swelling as needed. As the ice melts, be careful to avoid getting your wrap, splint, or cast wet. After 48 hours, apply heat (warm shower or warm bath) for 15 to 20 minutes several times a day, or alternate ice and heat.   · You may use over-the-counter pain medicine to control pain, unless another pain medicine was prescribed. If you have chronic liver or kidney disease or ever had a stomach ulcer or GI bleeding, talk with your doctor before using these medicines.  · If you were given a splint or brace, wear it for the time advised by your doctor.  Follow-up care  Follow up with your healthcare provider as advised. Any X-rays you had today dont show any broken bones, breaks, or fractures. Sometimes fractures dont show up on the first X-ray. Bruises and sprains can sometimes hurt  as much as a fracture. These injuries can take time to heal completely. If your symptoms dont improve or they get worse, talk with your doctor. You may need a repeat X-ray. If X-rays were taken, you will be told of any new findings that may affect your care.  When to seek medical advice  Call your healthcare provider right away if any of these occur:  · Pain or swelling increases  · Fingers or hand becomes cold, blue, numb, or tingly  Date Last Reviewed: 11/20/2015  © 9801-3559 [employer]. [address] [place] [address]. All rights reserved. This information is not intended as a substitute for professional medical care. Always follow your healthcare professional's instructions.

## 2020-02-22 NOTE — PATIENT INSTRUCTIONS
PLEASE READ YOUR DISCHARGE INSTRUCTIONS ENTIRELY AS IT CONTAINS IMPORTANT INFORMATION.    Tylenol and ibuprofen for pain    Rest, ice, compress, and elevate at home    Avoid prolonged use of the affected area until better.     Hydrocortisone cream twice daily 5-7 days only    Please see your primary care doctor if you develop new or worsening symptoms.     Please arrange follow up with your primary medical clinic as soon as possible. You must understand that you've received an Urgent Care treatment only and that you may be released before all of your medical problems are known or treated. You, the patient, will arrange for follow up as instructed. If your symptoms worsen or fail to improve you should go to the Emergency Room.    Nonspecific Dermatitis  Dermatitis is a skin rash caused by something that touches the skin and makes it irritated and inflamed.  Your skin may be red, swollen, dry, and may be cracked. Blisters may form and ooze. The rash will itch.  Dermatitis can form on the face and neck, backs of hands, forearms, genitals, and lower legs. Dermatitis is not passed from person to person.  Talk with your health care provider about what may have caused the rash. Common things that cause skin allergies are metal in jewelry, plants like poison ivy or poison oak, and certain skin care products. You will need to avoid the source of your rash in the future to prevent it from coming back. In some cases, the cause of the dermatitis may not be found.  Treatment is done to relieve itching and prevent the rash from coming back. The rash should go away in a few days to a few weeks.  Home care  The health care provider may prescribe medications to relieve swelling and itching. Follow all instructions when using these medications.  · Avoid anything that heats up your skin, such as hot showers or baths, or direct sunlight. This can make itching worse.  · Stay away from whatever you think caused the rash.  · Bathe in  warm, not hot, water. Apply a moisturizing lotion after bathing to prevent dry skin.  · Avoid skin irritants such as wool or silk clothing, grease, oils, harsh soaps, and detergents.  · Apply cold compresses to soothe your sores to help relieve your symptoms. Do this for 30 minutes 3 to 4 times a day. You can make a cold compress by soaking a cloth in cold water. Squeeze out excess water. You can add colloidal oatmeal to the water to help reduce itching. For severe itching in a small area, apply an ice pack wrapped in a thin towel. Do this for 20 minutes 3 to 4 times a day.  · You can also help relieve large areas of itching by taking a lukewarm bath with colloidal oatmeal added to the water.  · Use hydrocortisone cream for redness and irritation, unless another medicine was prescribed. You can also use benzocaine anesthetic cream or spray.  · Use oral diphenhydramine to help reduce itching. This is an antihistamine you can buy at drug and grocery stores. It can make you sleepy, so use lower doses during the daytime. Or you can use loratadine. This is an antihistamine that will not make you sleepy. Dont use diphenhydramine if you have glaucoma or have trouble urinating because of an enlarged prostate.  · Wash your hands or use an antibacterial gel often to prevent the spread of the rash.  Follow-up care  Follow up with your health care provider. Make an appointment with your health care provider if your symptoms do not get better in the next 1 to 2 weeks.  When to seek medical advice  Call your health care provider right away if any of these occur:  · Spreading of the rash to other parts of your body  · Severe swelling of your face, eyelids, mouth, throat or tongue  · Trouble urinating due to swelling in the genital area  · Fever of 100.4°F (38°C) or higher  · Redness or swelling that gets worse  · Pain that gets worse  · Foul-smelling fluid leaking from the skin  · Yellow-brown crusts on the open blisters  · Joint  pain   Date Last Reviewed: 7/23/2014  © 3324-1309 [employer]. [address] [place] [address], [place] [address]. All rights reserved. This information is not intended as a substitute for professional medical care. Always follow your healthcare professional's instructions.        Wrist Sprain  A sprain is an injury to the ligaments or capsule that holds a joint together. There are no broken bones. Most sprains take about 3 to 6 weeks to heal. If it a severe sprain where the ligament is completely torn, it can take months to recover.     Most wrist sprains are treated with a splint, wrist brace, or elastic wrap for support. Severe sprains may require surgery.  Home care  · Keep your arm elevated to reduce pain and swelling. This is very important during the first 48 hours.  · Apply an ice pack over the injured area for 15 to 20 minutes every 3 to 6 hours. You should do this for the first 24 to 48 hours. You can make an ice pack by filling a plastic bag that seals at the top with ice cubes and then wrapping it with a thin towel. Continue to use ice packs for relief of pain and swelling as needed. As the ice melts, be careful to avoid getting your wrap, splint, or cast wet. After 48 hours, apply heat (warm shower or warm bath) for 15 to 20 minutes several times a day, or alternate ice and heat.   · You may use over-the-counter pain medicine to control pain, unless another pain medicine was prescribed. If you have chronic liver or kidney disease or ever had a stomach ulcer or GI bleeding, talk with your doctor before using these medicines.  · If you were given a splint or brace, wear it for the time advised by your doctor.  Follow-up care  Follow up with your healthcare provider as advised. Any X-rays you had today dont show any broken bones, breaks, or fractures. Sometimes fractures dont show up on the first X-ray. Bruises and sprains can sometimes hurt as much as a fracture. These injuries can take time to  heal completely. If your symptoms dont improve or they get worse, talk with your doctor. You may need a repeat X-ray. If X-rays were taken, you will be told of any new findings that may affect your care.  When to seek medical advice  Call your healthcare provider right away if any of these occur:  · Pain or swelling increases  · Fingers or hand becomes cold, blue, numb, or tingly  Date Last Reviewed: 11/20/2015  © 2982-2940 [employer]. [address] [place] [address], [place] [address]. All rights reserved. This information is not intended as a substitute for professional medical care. Always follow your healthcare professional's instructions.

## 2020-04-09 ENCOUNTER — PATIENT MESSAGE (OUTPATIENT)
Dept: ADMINISTRATIVE | Facility: HOSPITAL | Age: 60
End: 2020-04-09

## 2020-04-18 DIAGNOSIS — N95.2 VAGINAL ATROPHY: ICD-10-CM

## 2020-04-20 RX ORDER — ESTRADIOL 0.1 MG/G
CREAM VAGINAL
Qty: 42.5 G | Refills: 0 | Status: SHIPPED | OUTPATIENT
Start: 2020-04-20 | End: 2020-05-25 | Stop reason: SDUPTHER

## 2020-05-04 ENCOUNTER — TELEPHONE (OUTPATIENT)
Dept: INTERNAL MEDICINE | Facility: CLINIC | Age: 60
End: 2020-05-04

## 2020-05-04 ENCOUNTER — PATIENT MESSAGE (OUTPATIENT)
Dept: OBSTETRICS AND GYNECOLOGY | Facility: CLINIC | Age: 60
End: 2020-05-04

## 2020-05-04 ENCOUNTER — PATIENT MESSAGE (OUTPATIENT)
Dept: INTERNAL MEDICINE | Facility: CLINIC | Age: 60
End: 2020-05-04

## 2020-05-04 ENCOUNTER — LAB VISIT (OUTPATIENT)
Dept: LAB | Facility: HOSPITAL | Age: 60
End: 2020-05-04
Attending: INTERNAL MEDICINE
Payer: COMMERCIAL

## 2020-05-04 DIAGNOSIS — R30.0 DYSURIA: Primary | ICD-10-CM

## 2020-05-04 DIAGNOSIS — Z20.822 SUSPECTED COVID-19 VIRUS INFECTION: ICD-10-CM

## 2020-05-04 DIAGNOSIS — Z20.822 SUSPECTED COVID-19 VIRUS INFECTION: Primary | ICD-10-CM

## 2020-05-04 LAB — SARS-COV-2 IGG SERPLBLD QL IA.RAPID: NEGATIVE

## 2020-05-04 PROCEDURE — 36415 COLL VENOUS BLD VENIPUNCTURE: CPT | Mod: PO

## 2020-05-04 PROCEDURE — 86769 SARS-COV-2 COVID-19 ANTIBODY: CPT

## 2020-05-04 RX ORDER — NITROFURANTOIN 25; 75 MG/1; MG/1
100 CAPSULE ORAL 2 TIMES DAILY
Qty: 14 CAPSULE | Refills: 0 | Status: SHIPPED | OUTPATIENT
Start: 2020-05-04 | End: 2020-05-11

## 2020-05-04 NOTE — TELEPHONE ENCOUNTER
----- Message from Carol Ann Herron sent at 5/4/2020  1:27 PM CDT -----  Contact: ABDULAZIZ ACKERMAN [[id number]]  Name of Who is Calling: ABDULAZIZ ACKERMAN [[id number]]    What is the request in detail: Patient is requesting a COVID-19 antibody test so she can find out before she goes out of town. She would like to be schedule at the [facility]. Please contact to further discuss and advise      Can the clinic reply by [facility]: yes    What [facility] Back [facility]: [phone]

## 2020-05-05 ENCOUNTER — TELEPHONE (OUTPATIENT)
Dept: OBSTETRICS AND GYNECOLOGY | Facility: CLINIC | Age: 60
End: 2020-05-05

## 2020-05-05 DIAGNOSIS — Z12.39 BREAST CANCER SCREENING: Primary | ICD-10-CM

## 2020-05-05 NOTE — TELEPHONE ENCOUNTER
----- Message from Dustin Vaz LPN sent at 5/4/2020  1:14 PM CDT -----  Contact: Nurse  Need mmg order   ----- Message -----  From: Manju Ledbetter  Sent: 5/4/2020  12:08 PM CDT  To: Rafaela Anthony Staff        Name of Who is Calling: Nurse      What is the request in detail: Nurse from [facility] called to get Pt order in the system she's at the facility.Please contact to further discuss and advise.        Can the clinic reply by [facility]: N      What Number to Call Back if not in [facility]: [phone]

## 2020-05-20 ENCOUNTER — PATIENT OUTREACH (OUTPATIENT)
Dept: ADMINISTRATIVE | Facility: OTHER | Age: 60
End: 2020-05-20

## 2020-05-21 ENCOUNTER — OFFICE VISIT (OUTPATIENT)
Dept: OPTOMETRY | Facility: CLINIC | Age: 60
End: 2020-05-21
Payer: COMMERCIAL

## 2020-05-21 DIAGNOSIS — H52.4 PRESBYOPIA: ICD-10-CM

## 2020-05-21 DIAGNOSIS — Z83.511 FAMILY HISTORY OF GLAUCOMA IN MOTHER: ICD-10-CM

## 2020-05-21 DIAGNOSIS — Z98.890 S/P LASIK (LASER ASSISTED IN SITU KERATOMILEUSIS) OF BOTH EYES: ICD-10-CM

## 2020-05-21 DIAGNOSIS — H25.13 NS (NUCLEAR SCLEROSIS), BILATERAL: Primary | ICD-10-CM

## 2020-05-21 DIAGNOSIS — H10.13 CONJUNCTIVITIS, ALLERGIC, BILATERAL: ICD-10-CM

## 2020-05-21 PROCEDURE — 92004 PR EYE EXAM, NEW PATIENT,COMPREHESV: ICD-10-PCS | Mod: S$GLB,,, | Performed by: OPTOMETRIST

## 2020-05-21 PROCEDURE — 99999 PR PBB SHADOW E&M-EST. PATIENT-LVL III: CPT | Mod: PBBFAC,,, | Performed by: OPTOMETRIST

## 2020-05-21 PROCEDURE — 92004 COMPRE OPH EXAM NEW PT 1/>: CPT | Mod: S$GLB,,, | Performed by: OPTOMETRIST

## 2020-05-21 PROCEDURE — 99999 PR PBB SHADOW E&M-EST. PATIENT-LVL III: ICD-10-PCS | Mod: PBBFAC,,, | Performed by: OPTOMETRIST

## 2020-05-21 NOTE — PROGRESS NOTES
HPI     Last eye exam was 2/9/15 with   Pt here for routine eye exam.    Pt states that she only wears reading glasses because she had lasik.  Pt states her eyes have been watering but could be from pollen.   Patient denies diplopia, headaches, flashes/floaters, and pain.  OTC eye drops PRN, LASIK Sx.  Hemoglobin A1C       Date                     Value               Ref Range             Status                04/06/2017               5.6                 4.5 - 6.2 %           Final                     Last edited by Alma Bernal on 5/21/2020 10:32 AM. (History)            Assessment /Plan     For exam results, see Encounter Report.      Conjunctivitis, allergic, bilateral   Zaditor or Pataday PRN    S/P LASIK (laser assisted in situ keratomileusis) of both eyes  Presbyopia   Ok to continue with readers PRN    Family history of glaucoma in mother    Nuclear sclerosis, bilateral              Mild, not visually significant     Refractive error              Rx specs SV with AR for driving pRN     RTC 1 year, for DFE

## 2020-05-25 ENCOUNTER — OFFICE VISIT (OUTPATIENT)
Dept: OBSTETRICS AND GYNECOLOGY | Facility: CLINIC | Age: 60
End: 2020-05-25
Attending: OBSTETRICS & GYNECOLOGY
Payer: COMMERCIAL

## 2020-05-25 ENCOUNTER — HOSPITAL ENCOUNTER (OUTPATIENT)
Dept: RADIOLOGY | Facility: OTHER | Age: 60
Discharge: HOME | End: 2020-05-25
Attending: OBSTETRICS & GYNECOLOGY
Payer: COMMERCIAL

## 2020-05-25 VITALS
SYSTOLIC BLOOD PRESSURE: 100 MMHG | BODY MASS INDEX: 24.29 KG/M2 | WEIGHT: 141.56 LBS | DIASTOLIC BLOOD PRESSURE: 72 MMHG

## 2020-05-25 DIAGNOSIS — Z12.39 BREAST CANCER SCREENING: ICD-10-CM

## 2020-05-25 DIAGNOSIS — N95.2 VAGINAL ATROPHY: ICD-10-CM

## 2020-05-25 DIAGNOSIS — Z01.419 WELL WOMAN EXAM: Primary | ICD-10-CM

## 2020-05-25 PROCEDURE — 77067 MAMMO DIGITAL SCREENING BILAT WITH TOMOSYNTHESIS_CAD: ICD-10-PCS | Mod: 26,,, | Performed by: RADIOLOGY

## 2020-05-25 PROCEDURE — 99396 PREV VISIT EST AGE 40-64: CPT | Mod: S$GLB,,, | Performed by: OBSTETRICS & GYNECOLOGY

## 2020-05-25 PROCEDURE — 99999 PR PBB SHADOW E&M-EST. PATIENT-LVL III: ICD-10-PCS | Mod: PBBFAC,,, | Performed by: OBSTETRICS & GYNECOLOGY

## 2020-05-25 PROCEDURE — 99999 PR PBB SHADOW E&M-EST. PATIENT-LVL III: CPT | Mod: PBBFAC,,, | Performed by: OBSTETRICS & GYNECOLOGY

## 2020-05-25 PROCEDURE — 77067 SCR MAMMO BI INCL CAD: CPT | Mod: 26,,, | Performed by: RADIOLOGY

## 2020-05-25 PROCEDURE — 99396 PR PREVENTIVE VISIT,EST,40-64: ICD-10-PCS | Mod: S$GLB,,, | Performed by: OBSTETRICS & GYNECOLOGY

## 2020-05-25 PROCEDURE — 77063 BREAST TOMOSYNTHESIS BI: CPT | Mod: 26,,, | Performed by: RADIOLOGY

## 2020-05-25 PROCEDURE — 77063 MAMMO DIGITAL SCREENING BILAT WITH TOMOSYNTHESIS_CAD: ICD-10-PCS | Mod: 26,,, | Performed by: RADIOLOGY

## 2020-05-25 PROCEDURE — 77067 SCR MAMMO BI INCL CAD: CPT | Mod: TC

## 2020-05-25 RX ORDER — ESTRADIOL 0.1 MG/G
CREAM VAGINAL
Qty: 42.5 G | Refills: 11 | Status: SHIPPED | OUTPATIENT
Start: 2020-05-25 | End: 2021-11-08

## 2020-05-25 NOTE — PROGRESS NOTES
CC: Well woman exam    Xochitl Weiss is a 59 y.o. female  status post hyst/bso presents for a well woman exam.  No current issues, problems, or complaints.      Has been using vaginal estrogen since her hysterectomy and doing well on it.   using estrace 3x/wk.    Had breast implants removed and happy with it.Dictation #1  MRN:[id number]  CSN:[id number]      Past Medical History:   Diagnosis Date    BRCA negative     Colon polyp     Diverticula of colon     Diverticulitis     Family history of breast cancer     mother    Family history of colon cancer     2 family members over age 60    General anesthetics causing adverse effect in therapeutic use     per patient report slow to awaken    Genetic testing     negative Integrated BRACAnalysis    GERD (gastroesophageal reflux disease)     Kidney stones     Mild hyperlipidemia      Past Surgical History:   Procedure Laterality Date    breast augmentation      BREAST CYST EXCISION      BREAST SURGERY      implant removal in 2020     SECTION      x 2    COLONOSCOPY      COLONOSCOPY N/A 3/8/2017    Procedure: COLONOSCOPY;  Surgeon: Torey Macias MD;  Location: [facility] ([address] [place] [address]);  Service: Endoscopy;  Laterality: N/A;    COLONOSCOPY N/A 2019    Procedure: COLONOSCOPY;  Surgeon: Torey Macias MD;  Location: [facility] ([facility]);  Service: Endoscopy;  Laterality: N/A;    EYE SURGERY      blephroplasty    HYSTERECTOMY      AUB    OOPHORECTOMY      TRIGGER FINGER RELEASE Right 2017    thumb and 3rd digit    TUBAL LIGATION       Family History   Problem Relation Age of Onset    Colon cancer Father     Cancer Father         colon cancer    Breast cancer Mother 68    Colon cancer Mother 70        twice    Uterine cancer Mother     Diabetes Mother     Hypertension Mother     Cancer Mother         colon    Ovarian cancer Maternal Aunt     Lung cancer Maternal Uncle     No Known Problems Daughter     No Known  Problems Son      labor Neg Hx     Eclampsia Neg Hx      Social History     Tobacco Use    Smoking status: Never Smoker    Smokeless tobacco: Never Used   Substance Use Topics    Alcohol use: Yes     Alcohol/week: 0.0 standard drinks     Frequency: 2-3 times a week     Drinks per session: 1 or 2     Binge frequency: Never     Comment: socially    Drug use: No     OB History        2    Para   2    Term   2            AB        Living   2       SAB        TAB        Ectopic        Multiple        Live Births                     /72   Wt 64.2 kg (141 lb 8.6 oz)   BMI 24.29 kg/m²     ROS:    GENERAL: Denies weight gain or weight loss. Feeling well overall.   SKIN: Denies rash or lesions.   HEAD: Denies head injury or headache.   NODES: Denies enlarged lymph nodes.   CHEST: Denies chest pain or shortness of breath.   CARDIOVASCULAR: Denies palpitations or left sided chest pain.   ABDOMEN: No abdominal pain, constipation, diarrhea, nausea, vomiting or rectal bleeding.   URINARY: No frequency, dysuria, hematuria, or burning on urination.  REPRODUCTIVE: See HPI.   BREASTS: The patient performs breast self-examination and denies pain, lumps, or nipple discharge.   HEMATOLOGIC: No easy bruisability or excessive bleeding.   MUSCULOSKELETAL: Denies joint pain or swelling.   NEUROLOGIC: Denies syncope or weakness.   PSYCHIATRIC: Denies depression, anxiety or mood swings.      PHYSICAL EXAM:     APPEARANCE: Well nourished, well developed, in no acute distress.  AFFECT: WNL, alert and oriented x 3.  SKIN: No acne or hirsutism.  NECK: Neck symmetric without masses or thyromegaly.  NODES: No inguinal, cervical, axillary or femoral lymph node enlargement.  CHEST: Good respiratory effort.   ABDOMEN: Soft. No tenderness or masses. No hepatosplenomegaly. No hernias.  BREASTS: Symmetrical, no skin changes or visible lesions. No palpable masses, nipple discharge bilaterally.  PELVIC: Normal external  female genitalia without lesions. Normal hair distribution. Adequate perineal body, normal urethral meatus. Vagina atrophic without lesions or discharge. No significant cystocele or rectocele. Bimanual exam shows uterus and cervix to be surgically absent. Adnexa without masses or tenderness.  EXTREMITIES: No edema.    ASSESSMENT & PLAN    ICD-10-CM ICD-9-CM    1. Well woman exam Z01.419 V72.31    2. Vaginal atrophy N95.2 627.3 estradioL (ESTRACE) 0.01 % (0.1 mg/gram) vaginal cream          Patient was counseled today on A.C.S. Pap guidelines and recommendations for yearly pelvic exams, mammograms and monthly self breast exams; to see her PCP for other health maintenance.

## 2020-06-08 ENCOUNTER — HOSPITAL ENCOUNTER (OUTPATIENT)
Dept: RADIOLOGY | Facility: OTHER | Age: 60
Discharge: HOME | End: 2020-06-08
Attending: OBSTETRICS & GYNECOLOGY
Payer: COMMERCIAL

## 2020-06-08 DIAGNOSIS — R92.8 ABNORMAL MAMMOGRAM: ICD-10-CM

## 2020-06-08 PROCEDURE — 77065 DX MAMMO INCL CAD UNI: CPT | Mod: 26,,, | Performed by: RADIOLOGY

## 2020-06-08 PROCEDURE — 77065 DX MAMMO INCL CAD UNI: CPT | Mod: TC

## 2020-06-08 PROCEDURE — 77061 BREAST TOMOSYNTHESIS UNI: CPT | Mod: 26,,, | Performed by: RADIOLOGY

## 2020-06-08 PROCEDURE — 77065 MAMMO DIGITAL DIAGNOSTIC RIGHT WITH TOMOSYNTHESIS_CAD: ICD-10-PCS | Mod: 26,,, | Performed by: RADIOLOGY

## 2020-06-08 PROCEDURE — 77061 MAMMO DIGITAL DIAGNOSTIC RIGHT WITH TOMOSYNTHESIS_CAD: ICD-10-PCS | Mod: 26,,, | Performed by: RADIOLOGY

## 2020-12-08 NOTE — TELEPHONE ENCOUNTER
[facility] RHEUMATOLOGY           Follow-up visit    Notes dictated to M*Modal. Please forgive any unintended errors.  Subjective:       Patient ID:   NAME: Todd Clark : 1955     65 y.o. female    Referring Doc: No ref. provider found  Other Physicians:    Chief Complaint:  Osteoarthritis and Fibromyalgia      HPI/Interval History:  The patient has been doing well.  Her pain level is manageable.  She is sleeping well.    ROS:   GEN:    no fever, night sweats or weight loss  SKIN:   no rashes, bruising, or swelling, no Raynauds, no photosensitivity  HEENT: no changes in vision, no mouth ulcers, no sicca symptoms, no scalp tenderness, no jaw claudication.  CV:      no CP, PND, SALVADOR, orthopnea, no palpitations  PULM: no SOB, cough, hemoptysis, sputum or pleuritic pain  GI:        no dysphagia, no GERD, no hematemesis, no abdominal pain, nausea, vomiting, constipation, diarrhea, melanotic stools, BRBPR  :      no hematuria, dysuria  NEURO: no paresthesias, headaches, acute visual disturbances  MUSCULOSKELETAL:  No red, hot, and/or swollen joints  PSYCH:   No increased insomnia, no increased anxiety, no increased depression    Past Medical/Surgical History:  History reviewed. No pertinent past medical history.  Past Surgical History:   Procedure Laterality Date    CARPAL TUNNEL RELEASE Right      SECTION      x 2    TONSILLECTOMY         Allergies:  Review of patient's allergies indicates:   Allergen Reactions    Codeine     Pcn [penicillins]        Social/Family History:  Social History     Socioeconomic History    Marital status:      Spouse name: Not on file    Number of children: Not on file    Years of education: Not on file    Highest education level: Not on file   Occupational History    Not on file   Social Needs    Financial resource strain: Not on file    Food insecurity     Worry: Not on file     Inability: Not on file    Transportation needs     Medical: Not on  Spoke to pt.   file     Non-medical: Not on file   Tobacco Use    Smoking status: Former Smoker     Packs/day: 1.00     Years: 18.00     Pack years: 18.00     Types: Cigarettes     Quit date:      Years since quittin.9    Smokeless tobacco: Never Used   Substance and Sexual Activity    Alcohol use: No    Drug use: No    Sexual activity: Not on file   Lifestyle    Physical activity     Days per week: Not on file     Minutes per session: Not on file    Stress: Not on file   Relationships    Social connections     Talks on phone: Not on file     Gets together: Not on file     Attends Yazidism service: Not on file     Active member of club or organization: Not on file     Attends meetings of clubs or organizations: Not on file     Relationship status: Not on file   Other Topics Concern    Not on file   Social History Narrative    Not on file     Family History   Problem Relation Age of Onset    Cancer Mother     Breast cancer Mother     Cancer Father     Heart attack Sister     Heart attack Sister     Breast cancer Paternal Grandmother      FAMILY HISTORY: negative for Connective Tissue Disease      Medications:    Current Outpatient Medications:     ALBUTEROL INHL, Inhale into the lungs., Disp: , Rfl:     cyclobenzaprine (FLEXERIL) 10 MG tablet, , Disp: , Rfl: 0    fluticasone (FLONASE) 50 mcg/actuation nasal spray, , Disp: , Rfl:     hydrOXYzine HCL (ATARAX) 25 MG tablet, TAKE 1 TABLET BY MOUTH TWICE DAILY AS NEEDED FOR ITCHING, Disp: , Rfl:     loratadine (CLARITIN) 10 mg tablet, , Disp: , Rfl:     mupirocin (BACTROBAN) 2 % ointment, APPLY A SMALL AMOUNT OF OINTMENT TOPICALLY TO AFFECTED AREA THREE TIMES DAILY, Disp: , Rfl:     naproxen (NAPROSYN) 500 MG tablet, Take 1 tablet (500 mg total) by mouth 2 (two) times daily with meals., Disp: 180 tablet, Rfl: 1    nortriptyline (PAMELOR) 10 MG capsule, Take 1 capsule (10 mg total) by mouth every evening. TOTAL DAILY DOSE 35 MG, Disp: 90 capsule, Rfl:  1    nortriptyline (PAMELOR) 25 MG capsule, TAKE 1 CAPSULE BY MOUTH IN THE EVENING TOTAL  DAILY  DOSE  35  MG, Disp: 90 capsule, Rfl: 1    ondansetron (ZOFRAN-ODT) 4 MG TbDL, Take 1 tablet (4 mg total) by mouth every 8 (eight) hours as needed., Disp: 12 tablet, Rfl: 0    sumatriptan (IMITREX) 50 MG tablet, , Disp: , Rfl:     UNABLE TO FIND, Active PK, Disp: , Rfl:       Objective:     Vitals:  Blood pressure 128/88, temperature 97.2 °F (36.2 °C), weight 117.9 kg (260 lb).    Physical Examination:   GEN: wn/wd female in no apparent distress  SKIN: no rashes, no sclerodactyly, no Raynaud's, no periungual erythema, no digital tip ulcerations, no nailbed pitting  HEAD: no alopecia, no scalp tenderness, no temporal artery tenderness or induration.  EYES: no pallor, no icterus, PERRLA  ENT:  no thrush, no mucosal dryness or ulcerations, adequate oral hygiene & dentition.  NECK: supple x 6, no masses, no thyromegaly, no lymphadenopathy.  CV:   S1 and S2 regular, no murmurs, gallop or rubs  CHEST: Normal respiratory effort;  normal breath sounds/no adventitious sounds. No signs of consolidation.  ABD: non-tender and non-distended; soft; normal bowel sounds; no rebound/guarding or tenderness. No hepatosplenomegaly.  Musculoskeletal:  No evidence of inflammatory arthritis  EXTREM: no clubbing, cyanosis or edema. normal pulses.  NEURO:  grossly intact; motor/sensory WNL; no tremors  PSYCH:  normal mood, affect and behavior    Labs:   No results found for: WBC, HGB, HCT, MCV, PLTCMP@  Total Protein   Date Value Ref Range Status   08/24/2017 7.3 6.0 - 8.5 g/dL      Albumin   Date Value Ref Range Status   08/24/2017 3.6 2.9 - 4.4 g/dL      CPK   Date Value Ref Range Status   08/24/2017 40 13 - 135 IU/L        Radiology/Diagnostic Studies:    None    Assessment/Discussion/Plan:   65 y.o. female with fibromyalgia-good control on Pamelor 35 mg nightly    PLAN:  I will continue her medication without change.    RTC:  I will see  her back in 6 months    Electronically signed by Monty Medina MD

## 2021-01-05 ENCOUNTER — PATIENT MESSAGE (OUTPATIENT)
Dept: ADMINISTRATIVE | Facility: HOSPITAL | Age: 61
End: 2021-01-05

## 2021-01-28 ENCOUNTER — PATIENT MESSAGE (OUTPATIENT)
Dept: INTERNAL MEDICINE | Facility: CLINIC | Age: 61
End: 2021-01-28

## 2021-01-28 DIAGNOSIS — Z12.31 ENCOUNTER FOR SCREENING MAMMOGRAM FOR HIGH-RISK PATIENT: Primary | ICD-10-CM

## 2021-01-28 DIAGNOSIS — Z91.89 AT HIGH RISK FOR BREAST CANCER: ICD-10-CM

## 2021-02-04 ENCOUNTER — HOSPITAL ENCOUNTER (OUTPATIENT)
Dept: RADIOLOGY | Facility: OTHER | Age: 61
Discharge: HOME | End: 2021-02-04
Attending: INTERNAL MEDICINE
Payer: COMMERCIAL

## 2021-02-04 DIAGNOSIS — Z91.89 AT HIGH RISK FOR BREAST CANCER: ICD-10-CM

## 2021-02-04 PROCEDURE — 25500020 PHARM REV CODE 255: Performed by: INTERNAL MEDICINE

## 2021-02-04 PROCEDURE — 77049 MRI BREAST W/WO CONTRAST, W/CAD, BILATERAL: ICD-10-PCS | Mod: 26,,, | Performed by: RADIOLOGY

## 2021-02-04 PROCEDURE — 77049 MRI BREAST C-+ W/CAD BI: CPT | Mod: 26,,, | Performed by: RADIOLOGY

## 2021-02-04 PROCEDURE — A9577 INJ MULTIHANCE: HCPCS | Performed by: INTERNAL MEDICINE

## 2021-02-04 PROCEDURE — 77049 MRI BREAST C-+ W/CAD BI: CPT | Mod: TC

## 2021-02-04 RX ADMIN — GADOBENATE DIMEGLUMINE 13 ML: 529 INJECTION, SOLUTION INTRAVENOUS at 11:02

## 2021-02-05 ENCOUNTER — TELEPHONE (OUTPATIENT)
Dept: RADIOLOGY | Facility: HOSPITAL | Age: 61
End: 2021-02-05

## 2021-02-09 ENCOUNTER — HOSPITAL ENCOUNTER (OUTPATIENT)
Dept: RADIOLOGY | Facility: HOSPITAL | Age: 61
Discharge: HOME | End: 2021-02-09
Attending: INTERNAL MEDICINE
Payer: COMMERCIAL

## 2021-02-09 VITALS — WEIGHT: 140 LBS | BODY MASS INDEX: 24.03 KG/M2

## 2021-02-09 DIAGNOSIS — R92.8 ABNORMAL MAMMOGRAM: ICD-10-CM

## 2021-02-09 DIAGNOSIS — R92.8 ABNORMAL MAMMOGRAM: Primary | ICD-10-CM

## 2021-02-09 PROCEDURE — 77065 DX MAMMO INCL CAD UNI: CPT | Mod: 26,LT,, | Performed by: RADIOLOGY

## 2021-02-09 PROCEDURE — 77065 MAMMO DIGITAL DIAGNOSTIC LEFT: ICD-10-PCS | Mod: 26,LT,, | Performed by: RADIOLOGY

## 2021-02-09 PROCEDURE — 77061 MAMMO DIGITAL DIAGNOSTIC LEFT WITH TOMO: ICD-10-PCS | Mod: 26,LT,, | Performed by: RADIOLOGY

## 2021-02-09 PROCEDURE — 88305 TISSUE EXAM BY PATHOLOGIST: CPT | Performed by: PATHOLOGY

## 2021-02-09 PROCEDURE — 88305 TISSUE EXAM BY PATHOLOGIST: ICD-10-PCS | Mod: 26,,, | Performed by: PATHOLOGY

## 2021-02-09 PROCEDURE — 27201068 MAMMO DIGITAL DIAGNOSTIC LEFT

## 2021-02-09 PROCEDURE — 77061 BREAST TOMOSYNTHESIS UNI: CPT | Mod: 26,LT,, | Performed by: RADIOLOGY

## 2021-02-09 PROCEDURE — 76642 US BREAST LEFT LIMITED: ICD-10-PCS | Mod: 26,LT,, | Performed by: RADIOLOGY

## 2021-02-09 PROCEDURE — 88305 TISSUE EXAM BY PATHOLOGIST: CPT | Mod: 26,,, | Performed by: PATHOLOGY

## 2021-02-09 PROCEDURE — 25000003 PHARM REV CODE 250: Performed by: INTERNAL MEDICINE

## 2021-02-09 PROCEDURE — 77065 MAMMO DIGITAL DIAGNOSTIC LEFT WITH TOMO: ICD-10-PCS | Mod: 26,LT,, | Performed by: RADIOLOGY

## 2021-02-09 PROCEDURE — 76642 ULTRASOUND BREAST LIMITED: CPT | Mod: TC,LT

## 2021-02-09 PROCEDURE — 19083 US BREAST BIOPSY WITH IMAGING 1ST SITE LEFT: ICD-10-PCS | Mod: LT,,, | Performed by: RADIOLOGY

## 2021-02-09 PROCEDURE — 19083 BX BREAST 1ST LESION US IMAG: CPT | Mod: LT

## 2021-02-09 PROCEDURE — 19083 BX BREAST 1ST LESION US IMAG: CPT | Mod: LT,,, | Performed by: RADIOLOGY

## 2021-02-09 PROCEDURE — 76642 ULTRASOUND BREAST LIMITED: CPT | Mod: 26,LT,, | Performed by: RADIOLOGY

## 2021-02-09 PROCEDURE — 77061 BREAST TOMOSYNTHESIS UNI: CPT | Mod: TC,LT

## 2021-02-09 RX ORDER — LIDOCAINE HYDROCHLORIDE AND EPINEPHRINE 20; 10 MG/ML; UG/ML
10 INJECTION, SOLUTION INFILTRATION; PERINEURAL ONCE
Status: COMPLETED | OUTPATIENT
Start: 2021-02-09 | End: 2021-02-09

## 2021-02-09 RX ORDER — LIDOCAINE HYDROCHLORIDE 10 MG/ML
3 INJECTION, SOLUTION EPIDURAL; INFILTRATION; INTRACAUDAL; PERINEURAL ONCE
Status: COMPLETED | OUTPATIENT
Start: 2021-02-09 | End: 2021-02-09

## 2021-02-09 RX ADMIN — LIDOCAINE HYDROCHLORIDE 3 ML: 10 INJECTION, SOLUTION EPIDURAL; INFILTRATION; INTRACAUDAL; PERINEURAL at 04:02

## 2021-02-09 RX ADMIN — LIDOCAINE HYDROCHLORIDE,EPINEPHRINE BITARTRATE 10 ML: 20; .01 INJECTION, SOLUTION INFILTRATION; PERINEURAL at 02:02

## 2021-02-11 ENCOUNTER — TELEPHONE (OUTPATIENT)
Dept: INTERNAL MEDICINE | Facility: CLINIC | Age: 61
End: 2021-02-11

## 2021-02-11 LAB
FINAL PATHOLOGIC DIAGNOSIS: NORMAL
GROSS: NORMAL

## 2021-02-12 ENCOUNTER — TELEPHONE (OUTPATIENT)
Dept: SURGERY | Facility: CLINIC | Age: 61
End: 2021-02-12

## 2021-03-09 ENCOUNTER — PATIENT MESSAGE (OUTPATIENT)
Dept: INTERNAL MEDICINE | Facility: CLINIC | Age: 61
End: 2021-03-09

## 2021-03-12 ENCOUNTER — IMMUNIZATION (OUTPATIENT)
Dept: FAMILY MEDICINE | Facility: CLINIC | Age: 61
End: 2021-03-12
Payer: COMMERCIAL

## 2021-03-12 DIAGNOSIS — Z23 NEED FOR VACCINATION: Primary | ICD-10-CM

## 2021-03-12 PROCEDURE — 0031A COVID-19,VECTOR-NR,RS-AD26,PF,0.5 ML DOSE VACCINE (JANSSEN): CPT | Mod: PBBFAC | Performed by: FAMILY MEDICINE

## 2021-04-22 ENCOUNTER — OFFICE VISIT (OUTPATIENT)
Dept: INTERNAL MEDICINE | Facility: CLINIC | Age: 61
End: 2021-04-22
Attending: INTERNAL MEDICINE
Payer: COMMERCIAL

## 2021-04-22 VITALS
HEIGHT: 64 IN | BODY MASS INDEX: 23.18 KG/M2 | OXYGEN SATURATION: 98 % | SYSTOLIC BLOOD PRESSURE: 102 MMHG | WEIGHT: 135.81 LBS | DIASTOLIC BLOOD PRESSURE: 76 MMHG | HEART RATE: 74 BPM

## 2021-04-22 DIAGNOSIS — R13.19 ESOPHAGEAL DYSPHAGIA: ICD-10-CM

## 2021-04-22 DIAGNOSIS — Z80.0 FAMILY HISTORY OF COLON CANCER: ICD-10-CM

## 2021-04-22 DIAGNOSIS — Z00.00 ANNUAL PHYSICAL EXAM: Primary | ICD-10-CM

## 2021-04-22 DIAGNOSIS — E55.9 VITAMIN D DEFICIENCY: ICD-10-CM

## 2021-04-22 PROCEDURE — 99396 PREV VISIT EST AGE 40-64: CPT | Mod: S$GLB,,, | Performed by: INTERNAL MEDICINE

## 2021-04-22 PROCEDURE — 3008F BODY MASS INDEX DOCD: CPT | Mod: CPTII,S$GLB,, | Performed by: INTERNAL MEDICINE

## 2021-04-22 PROCEDURE — 99999 PR PBB SHADOW E&M-EST. PATIENT-LVL III: ICD-10-PCS | Mod: PBBFAC,,, | Performed by: INTERNAL MEDICINE

## 2021-04-22 PROCEDURE — 99396 PR PREVENTIVE VISIT,EST,40-64: ICD-10-PCS | Mod: S$GLB,,, | Performed by: INTERNAL MEDICINE

## 2021-04-22 PROCEDURE — 99999 PR PBB SHADOW E&M-EST. PATIENT-LVL III: CPT | Mod: PBBFAC,,, | Performed by: INTERNAL MEDICINE

## 2021-04-22 PROCEDURE — 3008F PR BODY MASS INDEX (BMI) DOCUMENTED: ICD-10-PCS | Mod: CPTII,S$GLB,, | Performed by: INTERNAL MEDICINE

## 2021-04-26 ENCOUNTER — LAB VISIT (OUTPATIENT)
Dept: LAB | Facility: OTHER | Age: 61
End: 2021-04-26
Attending: INTERNAL MEDICINE
Payer: COMMERCIAL

## 2021-04-26 DIAGNOSIS — Z00.00 ANNUAL PHYSICAL EXAM: ICD-10-CM

## 2021-04-26 LAB
25(OH)D3+25(OH)D2 SERPL-MCNC: 45 NG/ML (ref 30–96)
ALBUMIN SERPL BCP-MCNC: 3.9 G/DL (ref 3.5–5.2)
ALP SERPL-CCNC: 59 U/L (ref 55–135)
ALT SERPL W/O P-5'-P-CCNC: 10 U/L (ref 10–44)
ANION GAP SERPL CALC-SCNC: 11 MMOL/L (ref 8–16)
AST SERPL-CCNC: 21 U/L (ref 10–40)
BASOPHILS # BLD AUTO: 0.02 K/UL (ref 0–0.2)
BASOPHILS NFR BLD: 0.4 % (ref 0–1.9)
BILIRUB SERPL-MCNC: 0.6 MG/DL (ref 0.1–1)
BUN SERPL-MCNC: 18 MG/DL (ref 6–20)
CALCIUM SERPL-MCNC: 9 MG/DL (ref 8.7–10.5)
CHLORIDE SERPL-SCNC: 107 MMOL/L (ref 95–110)
CHOLEST SERPL-MCNC: 228 MG/DL (ref 120–199)
CHOLEST/HDLC SERPL: 3.2 {RATIO} (ref 2–5)
CO2 SERPL-SCNC: 24 MMOL/L (ref 23–29)
CREAT SERPL-MCNC: 0.9 MG/DL (ref 0.5–1.4)
DIFFERENTIAL METHOD: NORMAL
EOSINOPHIL # BLD AUTO: 0.1 K/UL (ref 0–0.5)
EOSINOPHIL NFR BLD: 2.1 % (ref 0–8)
ERYTHROCYTE [DISTWIDTH] IN BLOOD BY AUTOMATED COUNT: 12.2 % (ref 11.5–14.5)
EST. GFR  (AFRICAN AMERICAN): >60 ML/MIN/1.73 M^2
EST. GFR  (NON AFRICAN AMERICAN): >60 ML/MIN/1.73 M^2
GLUCOSE SERPL-MCNC: 88 MG/DL (ref 70–110)
HCT VFR BLD AUTO: 42 % (ref 37–48.5)
HDLC SERPL-MCNC: 72 MG/DL (ref 40–75)
HDLC SERPL: 31.6 % (ref 20–50)
HGB BLD-MCNC: 13.8 G/DL (ref 12–16)
IMM GRANULOCYTES # BLD AUTO: 0.01 K/UL (ref 0–0.04)
IMM GRANULOCYTES NFR BLD AUTO: 0.2 % (ref 0–0.5)
LDLC SERPL CALC-MCNC: 141.8 MG/DL (ref 63–159)
LYMPHOCYTES # BLD AUTO: 1.8 K/UL (ref 1–4.8)
LYMPHOCYTES NFR BLD: 36.1 % (ref 18–48)
MCH RBC QN AUTO: 28.6 PG (ref 27–31)
MCHC RBC AUTO-ENTMCNC: 32.9 G/DL (ref 32–36)
MCV RBC AUTO: 87 FL (ref 82–98)
MONOCYTES # BLD AUTO: 0.5 K/UL (ref 0.3–1)
MONOCYTES NFR BLD: 9.2 % (ref 4–15)
NEUTROPHILS # BLD AUTO: 2.5 K/UL (ref 1.8–7.7)
NEUTROPHILS NFR BLD: 52 % (ref 38–73)
NONHDLC SERPL-MCNC: 156 MG/DL
NRBC BLD-RTO: 0 /100 WBC
PLATELET # BLD AUTO: 257 K/UL (ref 150–450)
PMV BLD AUTO: 9.2 FL (ref 9.2–12.9)
POTASSIUM SERPL-SCNC: 4.3 MMOL/L (ref 3.5–5.1)
PROT SERPL-MCNC: 6.8 G/DL (ref 6–8.4)
RBC # BLD AUTO: 4.82 M/UL (ref 4–5.4)
SODIUM SERPL-SCNC: 142 MMOL/L (ref 136–145)
TRIGL SERPL-MCNC: 71 MG/DL (ref 30–150)
TSH SERPL DL<=0.005 MIU/L-ACNC: 3.11 UIU/ML (ref 0.4–4)
WBC # BLD AUTO: 4.87 K/UL (ref 3.9–12.7)

## 2021-04-26 PROCEDURE — 84443 ASSAY THYROID STIM HORMONE: CPT | Performed by: INTERNAL MEDICINE

## 2021-04-26 PROCEDURE — 80061 LIPID PANEL: CPT | Performed by: INTERNAL MEDICINE

## 2021-04-26 PROCEDURE — 82306 VITAMIN D 25 HYDROXY: CPT | Performed by: INTERNAL MEDICINE

## 2021-04-26 PROCEDURE — 36415 COLL VENOUS BLD VENIPUNCTURE: CPT | Performed by: INTERNAL MEDICINE

## 2021-04-26 PROCEDURE — 80053 COMPREHEN METABOLIC PANEL: CPT | Performed by: INTERNAL MEDICINE

## 2021-04-26 PROCEDURE — 85025 COMPLETE CBC W/AUTO DIFF WBC: CPT | Performed by: INTERNAL MEDICINE

## 2021-04-26 PROCEDURE — 86703 HIV-1/HIV-2 1 RESULT ANTBDY: CPT | Performed by: INTERNAL MEDICINE

## 2021-04-27 LAB — HIV 1+2 AB+HIV1 P24 AG SERPL QL IA: NEGATIVE

## 2021-07-09 ENCOUNTER — PATIENT MESSAGE (OUTPATIENT)
Dept: INTERNAL MEDICINE | Facility: CLINIC | Age: 61
End: 2021-07-09

## 2021-07-09 ENCOUNTER — OFFICE VISIT (OUTPATIENT)
Dept: GASTROENTEROLOGY | Facility: CLINIC | Age: 61
End: 2021-07-09
Payer: COMMERCIAL

## 2021-07-09 VITALS
DIASTOLIC BLOOD PRESSURE: 69 MMHG | BODY MASS INDEX: 22.99 KG/M2 | HEIGHT: 64 IN | WEIGHT: 134.69 LBS | HEART RATE: 69 BPM | SYSTOLIC BLOOD PRESSURE: 108 MMHG

## 2021-07-09 DIAGNOSIS — R13.19 ESOPHAGEAL DYSPHAGIA: ICD-10-CM

## 2021-07-09 DIAGNOSIS — Z80.0 FAMILY HISTORY OF COLON CANCER IN MOTHER: ICD-10-CM

## 2021-07-09 DIAGNOSIS — Z80.0 FAMILY HISTORY OF COLON CANCER IN FATHER: ICD-10-CM

## 2021-07-09 DIAGNOSIS — Z12.31 ENCOUNTER FOR SCREENING MAMMOGRAM FOR MALIGNANT NEOPLASM OF BREAST: Primary | ICD-10-CM

## 2021-07-09 DIAGNOSIS — K21.9 GASTROESOPHAGEAL REFLUX DISEASE, UNSPECIFIED WHETHER ESOPHAGITIS PRESENT: Primary | ICD-10-CM

## 2021-07-09 PROCEDURE — 3008F BODY MASS INDEX DOCD: CPT | Mod: CPTII,S$GLB,, | Performed by: INTERNAL MEDICINE

## 2021-07-09 PROCEDURE — 3008F PR BODY MASS INDEX (BMI) DOCUMENTED: ICD-10-PCS | Mod: CPTII,S$GLB,, | Performed by: INTERNAL MEDICINE

## 2021-07-09 PROCEDURE — 99214 OFFICE O/P EST MOD 30 MIN: CPT | Mod: S$GLB,,, | Performed by: INTERNAL MEDICINE

## 2021-07-09 PROCEDURE — 99999 PR PBB SHADOW E&M-EST. PATIENT-LVL IV: CPT | Mod: PBBFAC,,, | Performed by: INTERNAL MEDICINE

## 2021-07-09 PROCEDURE — 99999 PR PBB SHADOW E&M-EST. PATIENT-LVL IV: ICD-10-PCS | Mod: PBBFAC,,, | Performed by: INTERNAL MEDICINE

## 2021-07-09 PROCEDURE — 1126F AMNT PAIN NOTED NONE PRSNT: CPT | Mod: S$GLB,,, | Performed by: INTERNAL MEDICINE

## 2021-07-09 PROCEDURE — 99214 PR OFFICE/OUTPT VISIT, EST, LEVL IV, 30-39 MIN: ICD-10-PCS | Mod: S$GLB,,, | Performed by: INTERNAL MEDICINE

## 2021-07-09 PROCEDURE — 1126F PR PAIN SEVERITY QUANTIFIED, NO PAIN PRESENT: ICD-10-PCS | Mod: S$GLB,,, | Performed by: INTERNAL MEDICINE

## 2021-07-12 ENCOUNTER — TELEPHONE (OUTPATIENT)
Dept: ENDOSCOPY | Facility: HOSPITAL | Age: 61
End: 2021-07-12

## 2021-07-12 ENCOUNTER — PATIENT MESSAGE (OUTPATIENT)
Dept: ENDOSCOPY | Facility: HOSPITAL | Age: 61
End: 2021-07-12

## 2021-07-26 ENCOUNTER — OFFICE VISIT (OUTPATIENT)
Dept: OBSTETRICS AND GYNECOLOGY | Facility: CLINIC | Age: 61
End: 2021-07-26
Attending: OBSTETRICS & GYNECOLOGY
Payer: COMMERCIAL

## 2021-07-26 VITALS
WEIGHT: 132.69 LBS | SYSTOLIC BLOOD PRESSURE: 104 MMHG | BODY MASS INDEX: 22.65 KG/M2 | HEIGHT: 64 IN | DIASTOLIC BLOOD PRESSURE: 70 MMHG

## 2021-07-26 DIAGNOSIS — N95.2 VAGINAL ATROPHY: ICD-10-CM

## 2021-07-26 DIAGNOSIS — Z01.419 WELL WOMAN EXAM: Primary | ICD-10-CM

## 2021-07-26 PROCEDURE — 1126F AMNT PAIN NOTED NONE PRSNT: CPT | Mod: CPTII,S$GLB,, | Performed by: OBSTETRICS & GYNECOLOGY

## 2021-07-26 PROCEDURE — 1126F PR PAIN SEVERITY QUANTIFIED, NO PAIN PRESENT: ICD-10-PCS | Mod: CPTII,S$GLB,, | Performed by: OBSTETRICS & GYNECOLOGY

## 2021-07-26 PROCEDURE — 1160F RVW MEDS BY RX/DR IN RCRD: CPT | Mod: CPTII,S$GLB,, | Performed by: OBSTETRICS & GYNECOLOGY

## 2021-07-26 PROCEDURE — 3008F BODY MASS INDEX DOCD: CPT | Mod: CPTII,S$GLB,, | Performed by: OBSTETRICS & GYNECOLOGY

## 2021-07-26 PROCEDURE — 99396 PR PREVENTIVE VISIT,EST,40-64: ICD-10-PCS | Mod: S$GLB,,, | Performed by: OBSTETRICS & GYNECOLOGY

## 2021-07-26 PROCEDURE — 99999 PR PBB SHADOW E&M-EST. PATIENT-LVL III: ICD-10-PCS | Mod: PBBFAC,,, | Performed by: OBSTETRICS & GYNECOLOGY

## 2021-07-26 PROCEDURE — 3008F PR BODY MASS INDEX (BMI) DOCUMENTED: ICD-10-PCS | Mod: CPTII,S$GLB,, | Performed by: OBSTETRICS & GYNECOLOGY

## 2021-07-26 PROCEDURE — 99999 PR PBB SHADOW E&M-EST. PATIENT-LVL III: CPT | Mod: PBBFAC,,, | Performed by: OBSTETRICS & GYNECOLOGY

## 2021-07-26 PROCEDURE — 1160F PR REVIEW ALL MEDS BY PRESCRIBER/CLIN PHARMACIST DOCUMENTED: ICD-10-PCS | Mod: CPTII,S$GLB,, | Performed by: OBSTETRICS & GYNECOLOGY

## 2021-07-26 PROCEDURE — 1159F MED LIST DOCD IN RCRD: CPT | Mod: CPTII,S$GLB,, | Performed by: OBSTETRICS & GYNECOLOGY

## 2021-07-26 PROCEDURE — 1159F PR MEDICATION LIST DOCUMENTED IN MEDICAL RECORD: ICD-10-PCS | Mod: CPTII,S$GLB,, | Performed by: OBSTETRICS & GYNECOLOGY

## 2021-07-26 PROCEDURE — 99396 PREV VISIT EST AGE 40-64: CPT | Mod: S$GLB,,, | Performed by: OBSTETRICS & GYNECOLOGY

## 2021-07-26 RX ORDER — ESTRADIOL 10 UG/1
10 INSERT VAGINAL
Qty: 24 TABLET | Refills: 3 | Status: SHIPPED | OUTPATIENT
Start: 2021-07-26 | End: 2022-08-15

## 2021-08-06 ENCOUNTER — PATIENT MESSAGE (OUTPATIENT)
Dept: INTERNAL MEDICINE | Facility: CLINIC | Age: 61
End: 2021-08-06

## 2021-08-10 ENCOUNTER — HOSPITAL ENCOUNTER (OUTPATIENT)
Dept: RADIOLOGY | Facility: HOSPITAL | Age: 61
Discharge: HOME | End: 2021-08-10
Attending: INTERNAL MEDICINE
Payer: COMMERCIAL

## 2021-08-10 VITALS — BODY MASS INDEX: 22.53 KG/M2 | WEIGHT: 132 LBS | HEIGHT: 64 IN

## 2021-08-10 DIAGNOSIS — R92.8 ABNORMAL MAMMOGRAM: ICD-10-CM

## 2021-08-10 PROCEDURE — 77062 MAMMO DIGITAL DIAGNOSTIC BILAT WITH TOMO: ICD-10-PCS | Mod: 26,,, | Performed by: RADIOLOGY

## 2021-08-10 PROCEDURE — 77062 BREAST TOMOSYNTHESIS BI: CPT | Mod: TC

## 2021-08-10 PROCEDURE — 77066 DX MAMMO INCL CAD BI: CPT | Mod: 26,,, | Performed by: RADIOLOGY

## 2021-08-10 PROCEDURE — 77066 MAMMO DIGITAL DIAGNOSTIC BILAT WITH TOMO: ICD-10-PCS | Mod: 26,,, | Performed by: RADIOLOGY

## 2021-08-10 PROCEDURE — 77062 BREAST TOMOSYNTHESIS BI: CPT | Mod: 26,,, | Performed by: RADIOLOGY

## 2021-09-24 ENCOUNTER — PATIENT MESSAGE (OUTPATIENT)
Dept: GASTROENTEROLOGY | Facility: CLINIC | Age: 61
End: 2021-09-24

## 2021-09-25 ENCOUNTER — HOSPITAL ENCOUNTER (EMERGENCY)
Facility: HOSPITAL | Age: 61
Discharge: HOME | End: 2021-09-25
Attending: EMERGENCY MEDICINE
Payer: COMMERCIAL

## 2021-09-25 VITALS
BODY MASS INDEX: 22.53 KG/M2 | HEIGHT: 64 IN | HEART RATE: 89 BPM | TEMPERATURE: 99 F | SYSTOLIC BLOOD PRESSURE: 121 MMHG | RESPIRATION RATE: 16 BRPM | OXYGEN SATURATION: 97 % | DIASTOLIC BLOOD PRESSURE: 59 MMHG | WEIGHT: 132 LBS

## 2021-09-25 DIAGNOSIS — Z20.822 COVID-19 VIRUS NOT DETECTED: ICD-10-CM

## 2021-09-25 DIAGNOSIS — K57.92 DIVERTICULITIS: Primary | ICD-10-CM

## 2021-09-25 LAB
ALBUMIN SERPL BCP-MCNC: 3.9 G/DL (ref 3.5–5.2)
ALP SERPL-CCNC: 77 U/L (ref 55–135)
ALT SERPL W/O P-5'-P-CCNC: 9 U/L (ref 10–44)
ANION GAP SERPL CALC-SCNC: 12 MMOL/L (ref 8–16)
AST SERPL-CCNC: 16 U/L (ref 10–40)
BACTERIA #/AREA URNS AUTO: ABNORMAL /HPF
BASOPHILS # BLD AUTO: 0.02 K/UL (ref 0–0.2)
BASOPHILS NFR BLD: 0.1 % (ref 0–1.9)
BILIRUB SERPL-MCNC: 0.8 MG/DL (ref 0.1–1)
BILIRUB UR QL STRIP: NEGATIVE
BUN SERPL-MCNC: 10 MG/DL (ref 6–20)
BUN SERPL-MCNC: 11 MG/DL (ref 6–30)
BUN SERPL-MCNC: 11 MG/DL (ref 6–30)
CALCIUM SERPL-MCNC: 9.8 MG/DL (ref 8.7–10.5)
CHLORIDE SERPL-SCNC: 101 MMOL/L (ref 95–110)
CHLORIDE SERPL-SCNC: 102 MMOL/L (ref 95–110)
CHLORIDE SERPL-SCNC: 102 MMOL/L (ref 95–110)
CLARITY UR REFRACT.AUTO: ABNORMAL
CO2 SERPL-SCNC: 23 MMOL/L (ref 23–29)
COLOR UR AUTO: YELLOW
CREAT SERPL-MCNC: 0.7 MG/DL (ref 0.5–1.4)
CREAT SERPL-MCNC: 0.7 MG/DL (ref 0.5–1.4)
CREAT SERPL-MCNC: 0.8 MG/DL (ref 0.5–1.4)
CTP QC/QA: YES
DIFFERENTIAL METHOD: ABNORMAL
EOSINOPHIL # BLD AUTO: 0 K/UL (ref 0–0.5)
EOSINOPHIL NFR BLD: 0.2 % (ref 0–8)
ERYTHROCYTE [DISTWIDTH] IN BLOOD BY AUTOMATED COUNT: 12.3 % (ref 11.5–14.5)
EST. GFR  (AFRICAN AMERICAN): >60 ML/MIN/1.73 M^2
EST. GFR  (NON AFRICAN AMERICAN): >60 ML/MIN/1.73 M^2
GLUCOSE SERPL-MCNC: 100 MG/DL (ref 70–110)
GLUCOSE SERPL-MCNC: 106 MG/DL (ref 70–110)
GLUCOSE SERPL-MCNC: 107 MG/DL (ref 70–110)
GLUCOSE UR QL STRIP: NEGATIVE
HCT VFR BLD AUTO: 42.9 % (ref 37–48.5)
HCT VFR BLD CALC: 43 %PCV (ref 36–54)
HCT VFR BLD CALC: 44 %PCV (ref 36–54)
HGB BLD-MCNC: 14.6 G/DL (ref 12–16)
HGB UR QL STRIP: ABNORMAL
IMM GRANULOCYTES # BLD AUTO: 0.03 K/UL (ref 0–0.04)
IMM GRANULOCYTES NFR BLD AUTO: 0.2 % (ref 0–0.5)
KETONES UR QL STRIP: NEGATIVE
LEUKOCYTE ESTERASE UR QL STRIP: NEGATIVE
LIPASE SERPL-CCNC: 15 U/L (ref 4–60)
LYMPHOCYTES # BLD AUTO: 1.4 K/UL (ref 1–4.8)
LYMPHOCYTES NFR BLD: 10.2 % (ref 18–48)
MCH RBC QN AUTO: 29.5 PG (ref 27–31)
MCHC RBC AUTO-ENTMCNC: 34 G/DL (ref 32–36)
MCV RBC AUTO: 87 FL (ref 82–98)
MICROSCOPIC COMMENT: ABNORMAL
MONOCYTES # BLD AUTO: 0.9 K/UL (ref 0.3–1)
MONOCYTES NFR BLD: 6.7 % (ref 4–15)
NEUTROPHILS # BLD AUTO: 11 K/UL (ref 1.8–7.7)
NEUTROPHILS NFR BLD: 82.6 % (ref 38–73)
NITRITE UR QL STRIP: NEGATIVE
NRBC BLD-RTO: 0 /100 WBC
PH UR STRIP: 6 [PH] (ref 5–8)
PLATELET # BLD AUTO: 281 K/UL (ref 150–450)
PMV BLD AUTO: 8.9 FL (ref 9.2–12.9)
POC IONIZED CALCIUM: 1.19 MMOL/L (ref 1.06–1.42)
POC IONIZED CALCIUM: 1.19 MMOL/L (ref 1.06–1.42)
POC TCO2 (MEASURED): 23 MMOL/L (ref 23–29)
POC TCO2 (MEASURED): 25 MMOL/L (ref 23–29)
POTASSIUM BLD-SCNC: 4.1 MMOL/L (ref 3.5–5.1)
POTASSIUM BLD-SCNC: 4.2 MMOL/L (ref 3.5–5.1)
POTASSIUM SERPL-SCNC: 4.1 MMOL/L (ref 3.5–5.1)
PROT SERPL-MCNC: 7.4 G/DL (ref 6–8.4)
PROT UR QL STRIP: NEGATIVE
RBC # BLD AUTO: 4.95 M/UL (ref 4–5.4)
RBC #/AREA URNS AUTO: 4 /HPF (ref 0–4)
SAMPLE: NORMAL
SAMPLE: NORMAL
SARS-COV-2 RDRP RESP QL NAA+PROBE: NEGATIVE
SODIUM BLD-SCNC: 137 MMOL/L (ref 136–145)
SODIUM BLD-SCNC: 138 MMOL/L (ref 136–145)
SODIUM SERPL-SCNC: 137 MMOL/L (ref 136–145)
SP GR UR STRIP: 1.02 (ref 1–1.03)
SQUAMOUS #/AREA URNS AUTO: 10 /HPF
URN SPEC COLLECT METH UR: ABNORMAL
WBC # BLD AUTO: 13.34 K/UL (ref 3.9–12.7)

## 2021-09-25 PROCEDURE — U0002 COVID-19 LAB TEST NON-CDC: HCPCS | Performed by: PHYSICIAN ASSISTANT

## 2021-09-25 PROCEDURE — 83690 ASSAY OF LIPASE: CPT | Performed by: PHYSICIAN ASSISTANT

## 2021-09-25 PROCEDURE — 96375 TX/PRO/DX INJ NEW DRUG ADDON: CPT

## 2021-09-25 PROCEDURE — 80053 COMPREHEN METABOLIC PANEL: CPT | Performed by: PHYSICIAN ASSISTANT

## 2021-09-25 PROCEDURE — 81001 URINALYSIS AUTO W/SCOPE: CPT | Performed by: PHYSICIAN ASSISTANT

## 2021-09-25 PROCEDURE — 99284 PR EMERGENCY DEPT VISIT,LEVEL IV: ICD-10-PCS | Mod: CR,CS,, | Performed by: PHYSICIAN ASSISTANT

## 2021-09-25 PROCEDURE — 85025 COMPLETE CBC W/AUTO DIFF WBC: CPT | Performed by: PHYSICIAN ASSISTANT

## 2021-09-25 PROCEDURE — 25000003 PHARM REV CODE 250: Performed by: PHYSICIAN ASSISTANT

## 2021-09-25 PROCEDURE — 96374 THER/PROPH/DIAG INJ IV PUSH: CPT

## 2021-09-25 PROCEDURE — 63600175 PHARM REV CODE 636 W HCPCS: Performed by: PHYSICIAN ASSISTANT

## 2021-09-25 PROCEDURE — 25500020 PHARM REV CODE 255: Performed by: EMERGENCY MEDICINE

## 2021-09-25 PROCEDURE — 99284 EMERGENCY DEPT VISIT MOD MDM: CPT | Mod: CR,CS,, | Performed by: PHYSICIAN ASSISTANT

## 2021-09-25 PROCEDURE — 99285 EMERGENCY DEPT VISIT HI MDM: CPT | Mod: 25

## 2021-09-25 RX ORDER — AMOXICILLIN AND CLAVULANATE POTASSIUM 875; 125 MG/1; MG/1
1 TABLET, FILM COATED ORAL EVERY 8 HOURS
Qty: 21 TABLET | Refills: 0 | Status: SHIPPED | OUTPATIENT
Start: 2021-09-25 | End: 2021-10-02

## 2021-09-25 RX ORDER — AMOXICILLIN AND CLAVULANATE POTASSIUM 875; 125 MG/1; MG/1
1 TABLET, FILM COATED ORAL
Status: COMPLETED | OUTPATIENT
Start: 2021-09-25 | End: 2021-09-25

## 2021-09-25 RX ORDER — DEXTROMETHORPHAN HYDROBROMIDE, GUAIFENESIN 5; 100 MG/5ML; MG/5ML
650 LIQUID ORAL EVERY 6 HOURS PRN
Qty: 20 TABLET | Refills: 0 | Status: SHIPPED | OUTPATIENT
Start: 2021-09-25 | End: 2021-11-08

## 2021-09-25 RX ORDER — ONDANSETRON 2 MG/ML
4 INJECTION INTRAMUSCULAR; INTRAVENOUS
Status: COMPLETED | OUTPATIENT
Start: 2021-09-25 | End: 2021-09-25

## 2021-09-25 RX ORDER — ONDANSETRON 4 MG/1
4 TABLET, FILM COATED ORAL EVERY 6 HOURS PRN
Qty: 12 TABLET | Refills: 0 | Status: SHIPPED | OUTPATIENT
Start: 2021-09-25 | End: 2021-11-08

## 2021-09-25 RX ORDER — MORPHINE SULFATE 2 MG/ML
2 INJECTION, SOLUTION INTRAMUSCULAR; INTRAVENOUS
Status: COMPLETED | OUTPATIENT
Start: 2021-09-25 | End: 2021-09-25

## 2021-09-25 RX ADMIN — ONDANSETRON 4 MG: 2 INJECTION INTRAMUSCULAR; INTRAVENOUS at 10:09

## 2021-09-25 RX ADMIN — MORPHINE SULFATE 2 MG: 2 INJECTION, SOLUTION INTRAMUSCULAR; INTRAVENOUS at 10:09

## 2021-09-25 RX ADMIN — AMOXICILLIN AND CLAVULANATE POTASSIUM 1 TABLET: 875; 125 TABLET, FILM COATED ORAL at 11:09

## 2021-09-25 RX ADMIN — IOHEXOL 75 ML: 350 INJECTION, SOLUTION INTRAVENOUS at 10:09

## 2021-09-29 ENCOUNTER — PATIENT MESSAGE (OUTPATIENT)
Dept: ENDOSCOPY | Facility: HOSPITAL | Age: 61
End: 2021-09-29

## 2021-09-29 ENCOUNTER — TELEPHONE (OUTPATIENT)
Dept: ENDOSCOPY | Facility: HOSPITAL | Age: 61
End: 2021-09-29

## 2021-09-29 DIAGNOSIS — Z01.818 PRE-OP TESTING: ICD-10-CM

## 2021-10-23 ENCOUNTER — LAB VISIT (OUTPATIENT)
Dept: SPORTS MEDICINE | Facility: CLINIC | Age: 61
End: 2021-10-23
Payer: COMMERCIAL

## 2021-10-23 DIAGNOSIS — Z01.818 PRE-OP TESTING: ICD-10-CM

## 2021-10-23 LAB
SARS-COV-2 RNA RESP QL NAA+PROBE: NOT DETECTED
SARS-COV-2- CYCLE NUMBER: NORMAL

## 2021-10-23 PROCEDURE — U0005 INFEC AGEN DETEC AMPLI PROBE: HCPCS | Performed by: FAMILY MEDICINE

## 2021-10-23 PROCEDURE — U0003 INFECTIOUS AGENT DETECTION BY NUCLEIC ACID (DNA OR RNA); SEVERE ACUTE RESPIRATORY SYNDROME CORONAVIRUS 2 (SARS-COV-2) (CORONAVIRUS DISEASE [COVID-19]), AMPLIFIED PROBE TECHNIQUE, MAKING USE OF HIGH THROUGHPUT TECHNOLOGIES AS DESCRIBED BY CMS-2020-01-R: HCPCS | Performed by: FAMILY MEDICINE

## 2021-10-26 ENCOUNTER — ANESTHESIA EVENT (OUTPATIENT)
Dept: ENDOSCOPY | Facility: HOSPITAL | Age: 61
End: 2021-10-26
Payer: COMMERCIAL

## 2021-10-26 ENCOUNTER — ANESTHESIA (OUTPATIENT)
Dept: ENDOSCOPY | Facility: HOSPITAL | Age: 61
End: 2021-10-26
Payer: COMMERCIAL

## 2021-10-26 ENCOUNTER — HOSPITAL ENCOUNTER (OUTPATIENT)
Facility: HOSPITAL | Age: 61
Discharge: HOME | End: 2021-10-26
Attending: INTERNAL MEDICINE | Admitting: INTERNAL MEDICINE
Payer: COMMERCIAL

## 2021-10-26 VITALS
HEART RATE: 56 BPM | BODY MASS INDEX: 22.53 KG/M2 | RESPIRATION RATE: 14 BRPM | SYSTOLIC BLOOD PRESSURE: 113 MMHG | DIASTOLIC BLOOD PRESSURE: 75 MMHG | HEIGHT: 64 IN | OXYGEN SATURATION: 99 % | TEMPERATURE: 98 F | WEIGHT: 132 LBS

## 2021-10-26 DIAGNOSIS — K21.00 REFLUX ESOPHAGITIS: ICD-10-CM

## 2021-10-26 PROCEDURE — 88305 TISSUE EXAM BY PATHOLOGIST: CPT | Mod: 26,,, | Performed by: STUDENT IN AN ORGANIZED HEALTH CARE EDUCATION/TRAINING PROGRAM

## 2021-10-26 PROCEDURE — 43239 PR EGD, FLEX, W/BIOPSY, SGL/MULTI: ICD-10-PCS | Mod: ,,, | Performed by: INTERNAL MEDICINE

## 2021-10-26 PROCEDURE — 25000003 PHARM REV CODE 250: Performed by: INTERNAL MEDICINE

## 2021-10-26 PROCEDURE — 88305 TISSUE EXAM BY PATHOLOGIST: CPT | Performed by: STUDENT IN AN ORGANIZED HEALTH CARE EDUCATION/TRAINING PROGRAM

## 2021-10-26 PROCEDURE — 27200942: Performed by: INTERNAL MEDICINE

## 2021-10-26 PROCEDURE — 43239 EGD BIOPSY SINGLE/MULTIPLE: CPT | Performed by: INTERNAL MEDICINE

## 2021-10-26 PROCEDURE — E9220 PRA ENDO ANESTHESIA: ICD-10-PCS | Mod: ,,, | Performed by: NURSE ANESTHETIST, CERTIFIED REGISTERED

## 2021-10-26 PROCEDURE — 91035 G-ESOPH REFLX TST W/ELECTROD: CPT | Performed by: INTERNAL MEDICINE

## 2021-10-26 PROCEDURE — 25000003 PHARM REV CODE 250: Performed by: NURSE ANESTHETIST, CERTIFIED REGISTERED

## 2021-10-26 PROCEDURE — 88305 TISSUE EXAM BY PATHOLOGIST: ICD-10-PCS | Mod: 26,,, | Performed by: STUDENT IN AN ORGANIZED HEALTH CARE EDUCATION/TRAINING PROGRAM

## 2021-10-26 PROCEDURE — 63600175 PHARM REV CODE 636 W HCPCS: Performed by: NURSE ANESTHETIST, CERTIFIED REGISTERED

## 2021-10-26 PROCEDURE — 43239 EGD BIOPSY SINGLE/MULTIPLE: CPT | Mod: ,,, | Performed by: INTERNAL MEDICINE

## 2021-10-26 PROCEDURE — 27201012 HC FORCEPS, HOT/COLD, DISP: Performed by: INTERNAL MEDICINE

## 2021-10-26 PROCEDURE — 37000008 HC ANESTHESIA 1ST 15 MINUTES: Performed by: INTERNAL MEDICINE

## 2021-10-26 PROCEDURE — 37000009 HC ANESTHESIA EA ADD 15 MINS: Performed by: INTERNAL MEDICINE

## 2021-10-26 PROCEDURE — E9220 PRA ENDO ANESTHESIA: HCPCS | Mod: ,,, | Performed by: NURSE ANESTHETIST, CERTIFIED REGISTERED

## 2021-10-26 RX ORDER — PROPOFOL 10 MG/ML
VIAL (ML) INTRAVENOUS
Status: DISCONTINUED | OUTPATIENT
Start: 2021-10-26 | End: 2021-10-26

## 2021-10-26 RX ORDER — LIDOCAINE HYDROCHLORIDE 20 MG/ML
INJECTION, SOLUTION EPIDURAL; INFILTRATION; INTRACAUDAL; PERINEURAL
Status: DISCONTINUED | OUTPATIENT
Start: 2021-10-26 | End: 2021-10-26

## 2021-10-26 RX ORDER — SODIUM CHLORIDE 9 MG/ML
INJECTION, SOLUTION INTRAVENOUS CONTINUOUS
Status: DISCONTINUED | OUTPATIENT
Start: 2021-10-26 | End: 2021-10-26 | Stop reason: HOSPADM

## 2021-10-26 RX ORDER — PROPOFOL 10 MG/ML
VIAL (ML) INTRAVENOUS CONTINUOUS PRN
Status: DISCONTINUED | OUTPATIENT
Start: 2021-10-26 | End: 2021-10-26

## 2021-10-26 RX ADMIN — PROPOFOL 150 MCG/KG/MIN: 10 INJECTION, EMULSION INTRAVENOUS at 08:10

## 2021-10-26 RX ADMIN — Medication 100 MG: at 08:10

## 2021-10-26 RX ADMIN — SODIUM CHLORIDE: 0.9 INJECTION, SOLUTION INTRAVENOUS at 07:10

## 2021-10-26 RX ADMIN — LIDOCAINE HYDROCHLORIDE 50 MG: 20 INJECTION, SOLUTION EPIDURAL; INFILTRATION; INTRACAUDAL at 08:10

## 2021-10-26 RX ADMIN — Medication 20 MG: at 08:10

## 2021-11-03 LAB
FINAL PATHOLOGIC DIAGNOSIS: NORMAL
Lab: NORMAL

## 2021-11-08 DIAGNOSIS — K21.9 GASTROESOPHAGEAL REFLUX DISEASE, UNSPECIFIED WHETHER ESOPHAGITIS PRESENT: Primary | ICD-10-CM

## 2021-11-08 RX ORDER — PANTOPRAZOLE SODIUM 20 MG/1
20 TABLET, DELAYED RELEASE ORAL
Qty: 90 TABLET | Refills: 3 | Status: SHIPPED | OUTPATIENT
Start: 2021-11-08 | End: 2023-07-20 | Stop reason: SDUPTHER

## 2021-11-08 RX ORDER — PANTOPRAZOLE SODIUM 20 MG/1
20 TABLET, DELAYED RELEASE ORAL DAILY
Qty: 30 TABLET | Refills: 11 | OUTPATIENT
Start: 2021-11-08 | End: 2022-11-08

## 2021-12-07 ENCOUNTER — OFFICE VISIT (OUTPATIENT)
Dept: GASTROENTEROLOGY | Facility: CLINIC | Age: 61
End: 2021-12-07
Payer: COMMERCIAL

## 2021-12-07 DIAGNOSIS — Z51.81 ENCOUNTER FOR MONITORING LONG-TERM PROTON PUMP INHIBITOR THERAPY: ICD-10-CM

## 2021-12-07 DIAGNOSIS — Z80.0 FAMILY HISTORY OF COLON CANCER IN MOTHER: ICD-10-CM

## 2021-12-07 DIAGNOSIS — Z80.0 FAMILY HISTORY OF COLON CANCER IN FATHER: Primary | ICD-10-CM

## 2021-12-07 DIAGNOSIS — K57.92 DIVERTICULITIS: ICD-10-CM

## 2021-12-07 DIAGNOSIS — Z79.899 ENCOUNTER FOR MONITORING LONG-TERM PROTON PUMP INHIBITOR THERAPY: ICD-10-CM

## 2021-12-07 DIAGNOSIS — K21.9 GASTROESOPHAGEAL REFLUX DISEASE, UNSPECIFIED WHETHER ESOPHAGITIS PRESENT: ICD-10-CM

## 2021-12-07 PROCEDURE — 99214 PR OFFICE/OUTPT VISIT, EST, LEVL IV, 30-39 MIN: ICD-10-PCS | Mod: 95,,, | Performed by: INTERNAL MEDICINE

## 2021-12-07 PROCEDURE — 99214 OFFICE O/P EST MOD 30 MIN: CPT | Mod: 95,,, | Performed by: INTERNAL MEDICINE

## 2022-01-12 ENCOUNTER — PATIENT MESSAGE (OUTPATIENT)
Dept: ENDOSCOPY | Facility: HOSPITAL | Age: 62
End: 2022-01-12
Payer: COMMERCIAL

## 2022-01-12 DIAGNOSIS — Z12.11 SPECIAL SCREENING FOR MALIGNANT NEOPLASMS, COLON: Primary | ICD-10-CM

## 2022-01-12 DIAGNOSIS — Z01.818 PRE-OP TESTING: ICD-10-CM

## 2022-01-12 RX ORDER — POLYETHYLENE GLYCOL 3350, SODIUM SULFATE ANHYDROUS, SODIUM BICARBONATE, SODIUM CHLORIDE, POTASSIUM CHLORIDE 236; 22.74; 6.74; 5.86; 2.97 G/4L; G/4L; G/4L; G/4L; G/4L
4 POWDER, FOR SOLUTION ORAL ONCE
Qty: 4000 ML | Refills: 0 | Status: SHIPPED | OUTPATIENT
Start: 2022-01-12 | End: 2022-01-12

## 2022-02-17 ENCOUNTER — OFFICE VISIT (OUTPATIENT)
Dept: OPTOMETRY | Facility: CLINIC | Age: 62
End: 2022-02-17
Payer: COMMERCIAL

## 2022-02-17 DIAGNOSIS — Z83.511 FAMILY HISTORY OF GLAUCOMA IN MOTHER: ICD-10-CM

## 2022-02-17 DIAGNOSIS — Z98.890 S/P LASIK (LASER ASSISTED IN SITU KERATOMILEUSIS) OF BOTH EYES: ICD-10-CM

## 2022-02-17 DIAGNOSIS — H52.4 PRESBYOPIA: ICD-10-CM

## 2022-02-17 DIAGNOSIS — H25.13 NS (NUCLEAR SCLEROSIS), BILATERAL: Primary | ICD-10-CM

## 2022-02-17 PROCEDURE — 92014 COMPRE OPH EXAM EST PT 1/>: CPT | Mod: S$GLB,,, | Performed by: OPTOMETRIST

## 2022-02-17 PROCEDURE — 1159F MED LIST DOCD IN RCRD: CPT | Mod: CPTII,S$GLB,, | Performed by: OPTOMETRIST

## 2022-02-17 PROCEDURE — 99999 PR PBB SHADOW E&M-EST. PATIENT-LVL II: CPT | Mod: PBBFAC,,, | Performed by: OPTOMETRIST

## 2022-02-17 PROCEDURE — 1159F PR MEDICATION LIST DOCUMENTED IN MEDICAL RECORD: ICD-10-PCS | Mod: CPTII,S$GLB,, | Performed by: OPTOMETRIST

## 2022-02-17 PROCEDURE — 92014 PR EYE EXAM, EST PATIENT,COMPREHESV: ICD-10-PCS | Mod: S$GLB,,, | Performed by: OPTOMETRIST

## 2022-02-17 PROCEDURE — 99999 PR PBB SHADOW E&M-EST. PATIENT-LVL II: ICD-10-PCS | Mod: PBBFAC,,, | Performed by: OPTOMETRIST

## 2022-02-17 NOTE — PROGRESS NOTES
HPI     Ocular health exam   DLS- 05/21/2020 Dr. Ferguson     Pt sts vision still stable and seeing well near vision uses OTC readers   +1.25.   Denies pain     (-)Flashes (-)Floaters  (-)Itch, (-)tear, (-)burn, (-)Dryness.  (-) OTC Drops  (-)Photophobia  (-)Glare    LASIK OU age 42   No other eye sx     FamOhx : Great aunt - Glaucoma         Last edited by Georgia Curtis on 2/17/2022  9:28 AM. (History)            Assessment /Plan     For exam results, see Encounter Report.          S/P LASIK (laser assisted in situ keratomileusis) of both eyes  Presbyopia              Ok to continue with readers PRN     Family history of glaucoma in mother and sister     Nuclear sclerosis, bilateral              Mild, not visually significant      Refractive error              Rx specs SV with AR for driving pRN     RTC 1 year, for DFE

## 2022-03-28 ENCOUNTER — LAB VISIT (OUTPATIENT)
Dept: PRIMARY CARE CLINIC | Facility: CLINIC | Age: 62
End: 2022-03-28
Payer: COMMERCIAL

## 2022-03-28 DIAGNOSIS — Z01.818 PRE-OP TESTING: ICD-10-CM

## 2022-03-28 PROCEDURE — U0005 INFEC AGEN DETEC AMPLI PROBE: HCPCS | Performed by: FAMILY MEDICINE

## 2022-03-28 PROCEDURE — U0003 INFECTIOUS AGENT DETECTION BY NUCLEIC ACID (DNA OR RNA); SEVERE ACUTE RESPIRATORY SYNDROME CORONAVIRUS 2 (SARS-COV-2) (CORONAVIRUS DISEASE [COVID-19]), AMPLIFIED PROBE TECHNIQUE, MAKING USE OF HIGH THROUGHPUT TECHNOLOGIES AS DESCRIBED BY CMS-2020-01-R: HCPCS | Performed by: FAMILY MEDICINE

## 2022-03-29 LAB
SARS-COV-2 RNA RESP QL NAA+PROBE: NOT DETECTED
SARS-COV-2- CYCLE NUMBER: NORMAL

## 2022-03-31 ENCOUNTER — HOSPITAL ENCOUNTER (OUTPATIENT)
Facility: HOSPITAL | Age: 62
Discharge: HOME | End: 2022-03-31
Attending: INTERNAL MEDICINE | Admitting: INTERNAL MEDICINE
Payer: COMMERCIAL

## 2022-03-31 ENCOUNTER — ANESTHESIA (OUTPATIENT)
Dept: ENDOSCOPY | Facility: HOSPITAL | Age: 62
End: 2022-03-31
Payer: COMMERCIAL

## 2022-03-31 ENCOUNTER — ANESTHESIA EVENT (OUTPATIENT)
Dept: ENDOSCOPY | Facility: HOSPITAL | Age: 62
End: 2022-03-31

## 2022-03-31 VITALS
HEART RATE: 63 BPM | OXYGEN SATURATION: 95 % | HEIGHT: 64 IN | BODY MASS INDEX: 22.88 KG/M2 | DIASTOLIC BLOOD PRESSURE: 67 MMHG | TEMPERATURE: 98 F | RESPIRATION RATE: 18 BRPM | SYSTOLIC BLOOD PRESSURE: 123 MMHG | WEIGHT: 134 LBS

## 2022-03-31 DIAGNOSIS — Z80.0 FAMILY HISTORY OF COLON CANCER: ICD-10-CM

## 2022-03-31 PROCEDURE — E9220 PRA ENDO ANESTHESIA: HCPCS | Mod: ,,, | Performed by: STUDENT IN AN ORGANIZED HEALTH CARE EDUCATION/TRAINING PROGRAM

## 2022-03-31 PROCEDURE — 37000009 HC ANESTHESIA EA ADD 15 MINS: Performed by: INTERNAL MEDICINE

## 2022-03-31 PROCEDURE — G0105 COLORECTAL SCRN; HI RISK IND: HCPCS | Mod: ,,, | Performed by: INTERNAL MEDICINE

## 2022-03-31 PROCEDURE — 37000008 HC ANESTHESIA 1ST 15 MINUTES: Performed by: INTERNAL MEDICINE

## 2022-03-31 PROCEDURE — E9220 PRA ENDO ANESTHESIA: ICD-10-PCS | Mod: ,,, | Performed by: STUDENT IN AN ORGANIZED HEALTH CARE EDUCATION/TRAINING PROGRAM

## 2022-03-31 PROCEDURE — G0105 COLORECTAL SCRN; HI RISK IND: HCPCS | Performed by: INTERNAL MEDICINE

## 2022-03-31 PROCEDURE — 63600175 PHARM REV CODE 636 W HCPCS: Performed by: STUDENT IN AN ORGANIZED HEALTH CARE EDUCATION/TRAINING PROGRAM

## 2022-03-31 PROCEDURE — G0105 COLORECTAL SCRN; HI RISK IND: ICD-10-PCS | Mod: ,,, | Performed by: INTERNAL MEDICINE

## 2022-03-31 PROCEDURE — 25000003 PHARM REV CODE 250: Performed by: STUDENT IN AN ORGANIZED HEALTH CARE EDUCATION/TRAINING PROGRAM

## 2022-03-31 RX ORDER — PROPOFOL 10 MG/ML
VIAL (ML) INTRAVENOUS
Status: DISCONTINUED | OUTPATIENT
Start: 2022-03-31 | End: 2022-03-31

## 2022-03-31 RX ORDER — LIDOCAINE HCL/PF 100 MG/5ML
SYRINGE (ML) INTRAVENOUS
Status: DISCONTINUED | OUTPATIENT
Start: 2022-03-31 | End: 2022-03-31

## 2022-03-31 RX ORDER — PROPOFOL 10 MG/ML
VIAL (ML) INTRAVENOUS CONTINUOUS PRN
Status: DISCONTINUED | OUTPATIENT
Start: 2022-03-31 | End: 2022-03-31

## 2022-03-31 RX ORDER — SODIUM CHLORIDE 9 MG/ML
INJECTION, SOLUTION INTRAVENOUS CONTINUOUS
Status: DISCONTINUED | OUTPATIENT
Start: 2022-03-31 | End: 2022-03-31 | Stop reason: HOSPADM

## 2022-03-31 RX ADMIN — PROPOFOL 70 MG: 10 INJECTION, EMULSION INTRAVENOUS at 02:03

## 2022-03-31 RX ADMIN — Medication 50 MG: at 02:03

## 2022-03-31 RX ADMIN — PROPOFOL 200 MCG/KG/MIN: 10 INJECTION, EMULSION INTRAVENOUS at 02:03

## 2022-03-31 RX ADMIN — SODIUM CHLORIDE: 0.9 INJECTION, SOLUTION INTRAVENOUS at 02:03

## 2022-03-31 NOTE — ANESTHESIA POSTPROCEDURE EVALUATION
Anesthesia Post Evaluation    Patient: Xochitl Weiss    Procedure(s) Performed: Procedure(s) (LRB):  COLONOSCOPY (N/A)    Final Anesthesia Type: general      Patient location during evaluation: GI PACU  Patient participation: Yes- Able to Participate  Level of consciousness: awake and alert and oriented  Post-procedure vital signs: reviewed and stable  Pain management: adequate  Airway patency: patent    PONV status at discharge: No PONV  Anesthetic complications: no      Cardiovascular status: blood pressure returned to baseline and hemodynamically stable  Respiratory status: unassisted, spontaneous ventilation and room air  Hydration status: euvolemic  Follow-up not needed.          Vitals Value Taken Time   /67 03/31/22 1538   Temp 36.7 °C (98.1 °F) 03/31/22 1507   Pulse 63 03/31/22 1538   Resp 18 03/31/22 1538   SpO2 95 % 03/31/22 1538         Event Time   Out of Recovery 15:45:57         Pain/Laura Score: Laura Score: 10 (3/31/2022  3:08 PM)

## 2022-03-31 NOTE — H&P
[facility]  History & Physical    Subjective:      Chief Complaint/Reason for Admission:     Colonoscopy    Xochitl Weiss is a 61 y.o. female.    Past Medical History:   Diagnosis Date    BRCA negative     Colon polyp     Diverticula of colon     Diverticulitis     Family history of breast cancer     mother    Family history of colon cancer     2 family members over age 60    General anesthetics causing adverse effect in therapeutic use     per patient report slow to awaken    Genetic testing     negative Integrated BRACAnalysis    GERD (gastroesophageal reflux disease)     Kidney stones     Mild hyperlipidemia      Past Surgical History:   Procedure Laterality Date    breast augmentation      BREAST CYST EXCISION Left 2021    BREAST SURGERY      implant removal in 2020     SECTION      x 2    COLONOSCOPY      COLONOSCOPY N/A 3/8/2017    Procedure: COLONOSCOPY;  Surgeon: Torey Macias MD;  [facility]  Service: Endoscopy;  Laterality: N/A;    COLONOSCOPY N/A 2019    Procedure: COLONOSCOPY;  Surgeon: Torey Macias MD;  [facility]  Service: Endoscopy;  Laterality: N/A;    ESOPHAGOGASTRODUODENOSCOPY N/A 10/26/2021    Procedure: EGD (ESOPHAGOGASTRODUODENOSCOPY);  Surgeon: Torey Macias MD;  Location: [facility] ([address] [place] [address]);  Service: Endoscopy;  Laterality: N/A;  Covid test 10/23 Ankush, cary [facility]    EYE SURGERY      blephroplasty    HYSTERECTOMY      AUB    OOPHORECTOMY      TONSILLECTOMY  1979    TRIGGER FINGER RELEASE Right 2017    thumb and 3rd digit    TUBAL LIGATION       Family History   Problem Relation Age of Onset    Colon cancer Father 68    Cancer Father         Colon    Breast cancer Mother 68    Colon cancer Mother 70        twice    Uterine cancer Mother 30    Diabetes Mother     Hypertension Mother     Cancer Mother         Uterine, Breast  & Colon    Ovarian cancer Maternal Aunt 60    Lung cancer Maternal Uncle 60    No Known Problems Daughter     No Known Problems Son      labor Neg Hx     Eclampsia Neg Hx     Pancreatic cancer Neg Hx     Kidney cancer Neg Hx     Celiac disease Neg Hx     Cirrhosis Neg Hx     Colon polyps Neg Hx     Crohn's disease Neg Hx     Esophageal cancer Neg Hx     Inflammatory bowel disease Neg Hx     Liver cancer Neg Hx     Liver disease Neg Hx     Rectal cancer Neg Hx     Stomach cancer Neg Hx     Ulcerative colitis Neg Hx      Social History     Tobacco Use    Smoking status: Never Smoker    Smokeless tobacco: Never Used   Substance Use Topics    Alcohol use: Yes     Alcohol/week: 6.0 standard drinks     Types: 6 Glasses of wine per week     Comment: socially    Drug use: No       PTA Medications   Medication Sig    estradioL (VAGIFEM) 10 mcg Tab Place 1 tablet (10 mcg total) vaginally twice a week.    pantoprazole (PROTONIX) 20 MG tablet Take 1 tablet (20 mg total) by mouth before breakfast.     Review of patient's allergies indicates:  No Known Allergies     Review of Systems   Constitutional: Negative for chills and fever.   Respiratory: Negative for shortness of breath.    Cardiovascular: Negative for chest pain.   Gastrointestinal: Negative for abdominal pain, blood in stool, constipation, diarrhea and melena.       Objective:      Vital Signs (Most Recent)  Temp: 98.1 °F (36.7 °C) (22 1349)  Pulse: 71 (22 1349)  Resp: 15 (22 1349)  BP: (!) 113/59 (22 1349)  SpO2: 96 % (22 1349)    Vital Signs Range (Last 24H):  Temp:  [98.1 °F (36.7 °C)]   Pulse:  [71]   Resp:  [15]   BP: (113)/(59)   SpO2:  [96 %]     Physical Exam  Constitutional:       Appearance: Normal appearance.   Cardiovascular:      Rate and Rhythm: Normal rate.   Pulmonary:      Effort: Pulmonary effort is normal.   Abdominal:      Palpations: Abdomen is soft.   Neurological:      Mental Status:  She is oriented to person, place, and time.         Assessment:      There are no hospital problems to display for this patient.      Plan:    Colonoscopy for follow up diverticulitis

## 2022-03-31 NOTE — PROVATION PATIENT INSTRUCTIONS
Discharge Summary/Instructions after an Endoscopic Procedure  Patient Name: Xochitl Weiss  Patient MRN: [id number]  Patient YOB: 1960 Thursday, March 31, 2022  Torey Macias MD  Dear patient,  As a result of recent federal legislation (The Federal Cures Act), you may   receive lab or pathology results from your procedure in your [facility]   account before your physician is able to contact you. Your physician or   their representative will relay the results to you with their   recommendations at their soonest availability.  Thank you,  RESTRICTIONS:  During your procedure today, you received medications for sedation.  These   medications may affect your judgment, balance and coordination.  Therefore,   for 24 hours, you have the following restrictions:   - DO NOT drive a car, operate machinery, make legal/financial decisions,   sign important papers or drink alcohol.    ACTIVITY:  Today: no heavy lifting, straining or running due to procedural   sedation/anesthesia.  The following day: return to full activity including work.  DIET:  Eat and drink normally unless instructed otherwise.     TREATMENT FOR COMMON SIDE EFFECTS:  - Mild abdominal pain, nausea, belching, bloating or excessive gas:  rest,   eat lightly and use a heating pad.  - Sore Throat: treat with throat lozenges and/or gargle with warm salt   water.  - Because air was used during the procedure, expelling large amounts of air   from your rectum or belching is normal.  - If a bowel prep was taken, you may not have a bowel movement for 1-3 days.    This is normal.  SYMPTOMS TO WATCH FOR AND REPORT TO YOUR PHYSICIAN:  1. Abdominal pain or bloating, other than gas cramps.  2. Chest pain.  3. Back pain.  4. Signs of infection such as: chills or fever occurring within 24 hours   after the procedure.  5. Rectal bleeding, which would show as bright red, maroon, or black stools.   (A tablespoon of blood from the rectum is not serious, especially if    hemorrhoids are present.)  6. Vomiting.  7. Weakness or dizziness.  GO DIRECTLY TO THE NEAREST EMERGENCY ROOM IF YOU HAVE ANY OF THE FOLLOWING:      Difficulty breathing              Chills and/or fever over 101 F   Persistent vomiting and/or vomiting blood   Severe abdominal pain   Severe chest pain   Black, tarry stools   Bleeding- more than one tablespoon   Any other symptom or condition that you feel may need urgent attention  Your doctor recommends these additional instructions:  If any biopsies were taken, your doctors clinic will contact you in 1 to 2   weeks with any results.  - Discharge patient to home.   - Repeat colonoscopy in 5 years for screening purposes.   - The findings and recommendations were discussed with the patient.  For questions, problems or results please call your physician - Torey Macias MD at Work:  [phone].  [facility], EMERGENCY ROOM PHONE NUMBER: [phone]  IF A COMPLICATION OR EMERGENCY SITUATION ARISES AND YOU ARE UNABLE TO REACH   YOUR PHYSICIAN - GO DIRECTLY TO THE EMERGENCY ROOM.  Torey Macias MD  3/31/2022 3:10:45 PM  This report has been verified and signed electronically.  Dear patient,  As a result of recent federal legislation (The Federal Cures Act), you may   receive lab or pathology results from your procedure in your [facility]   account before your physician is able to contact you. Your physician or   their representative will relay the results to you with their   recommendations at their soonest availability.  Thank you,  PROVATION

## 2022-03-31 NOTE — ANESTHESIA PREPROCEDURE EVALUATION
2022  Xochitl Weiss is a 61 y.o., female.  Patient Active Problem List   Diagnosis    Encounter for long-term (current) use of other medications    Family history of colon cancer    GERD (gastroesophageal reflux disease)    Hydronephrosis    Family history of breast cancer    Diverticulitis    Premature menopause on HRT    Hemorrhoids, internal    Diverticulosis of large intestine without hemorrhage    Food impaction of esophagus    Esophageal foreign body    Recurrent UTI    Dysphagia    At high risk for breast cancer    Family history of colon cancer in father    Reflux esophagitis     Past Medical History:   Diagnosis Date    BRCA negative     Colon polyp     Diverticula of colon     Diverticulitis     Family history of breast cancer     mother    Family history of colon cancer     2 family members over age 60    General anesthetics causing adverse effect in therapeutic use     per patient report slow to awaken    Genetic testing     negative Integrated BRACAnalysis    GERD (gastroesophageal reflux disease)     Kidney stones     Mild hyperlipidemia      Past Surgical History:   Procedure Laterality Date    breast augmentation      BREAST CYST EXCISION Left 2021    BREAST SURGERY      implant removal in 2020     SECTION      x 2    COLONOSCOPY      COLONOSCOPY N/A 3/8/2017    Procedure: COLONOSCOPY;  Surgeon: Torey Macias MD;  Location: [facility] ([address] [place] [address]);  Service: Endoscopy;  Laterality: N/A;    COLONOSCOPY N/A 2019    Procedure: COLONOSCOPY;  Surgeon: Torey Macias MD;  [facility]  Service: Endoscopy;  Laterality: N/A;    ESOPHAGOGASTRODUODENOSCOPY N/A 10/26/2021    Procedure: EGD (ESOPHAGOGASTRODUODENOSCOPY);  Surgeon: Torey Macias MD;  Location: [facility] ([address] [place] [address]);  Service: Endoscopy;  Laterality:  N/A;  Covid [facility] 10[facility]23 [facility]    EYE SURGERY      blephroplasty    HYSTERECTOMY  1995    AUB    OOPHORECTOMY      TONSILLECTOMY  1979    TRIGGER FINGER RELEASE Right 03/2017    thumb and 3rd digit    TUBAL LIGATION             Pre-op Assessment    I have reviewed the Patient Summary Reports.     I have reviewed the Nursing Notes. I have reviewed the NPO Status.   I have reviewed the Medications.     Review of Systems  Anesthesia Hx:  No problems with previous Anesthesia    Social:  Non-Smoker    Renal/:   Chronic Renal Disease    Hepatic/GI:   GERD        Physical Exam  General: Well nourished    Airway:  Mallampati: II   Mouth Opening: Normal  TM Distance: Normal  Neck ROM: Normal ROM    Dental:  Intact        Anesthesia Plan  Type of Anesthesia, risks & benefits discussed:    Anesthesia Type: Gen Natural Airway  Intra-op Monitoring Plan: Standard ASA Monitors  Induction:  IV  Informed Consent: Informed consent signed with the Patient and all parties understand the risks and agree with anesthesia plan.  All questions answered.   ASA Score: 2  Day of Surgery Review of History & Physical: H&P Update referred to the surgeon/provider.I have interviewed and examined the patient. I have reviewed the patient's H&P dated:     Ready For Surgery From Anesthesia Perspective.     .

## 2022-03-31 NOTE — TRANSFER OF CARE
"Anesthesia Transfer of Care Note    Patient: Xocihtl Weiss    Procedure(s) Performed: Procedure(s) (LRB):  COLONOSCOPY (N/A)    Patient location: GI    Anesthesia Type: general    Transport from OR: Transported from OR on room air with adequate spontaneous ventilation    Post pain: adequate analgesia    Post assessment: no apparent anesthetic complications and tolerated procedure well    Post vital signs: stable    Level of consciousness: sedated and responds to stimulation    Nausea/Vomiting: no nausea/vomiting    Complications: none    Transfer of care protocol was followed      Last vitals:   Visit Vitals  BP (!) 126/58   Pulse 78   Temp 36.7 °C (98.1 °F)   Resp 18   Ht 5' 4" (1.626 m)   Wt 60.8 kg (134 lb)   SpO2 96%   Breastfeeding No   BMI 23.00 kg/m²     "

## 2022-06-13 ENCOUNTER — PATIENT MESSAGE (OUTPATIENT)
Dept: INTERNAL MEDICINE | Facility: CLINIC | Age: 62
End: 2022-06-13
Payer: COMMERCIAL

## 2022-07-21 ENCOUNTER — OFFICE VISIT (OUTPATIENT)
Dept: INTERNAL MEDICINE | Facility: CLINIC | Age: 62
End: 2022-07-21
Attending: INTERNAL MEDICINE
Payer: COMMERCIAL

## 2022-07-21 ENCOUNTER — IMMUNIZATION (OUTPATIENT)
Dept: INTERNAL MEDICINE | Facility: CLINIC | Age: 62
End: 2022-07-21
Payer: COMMERCIAL

## 2022-07-21 VITALS
DIASTOLIC BLOOD PRESSURE: 80 MMHG | OXYGEN SATURATION: 97 % | BODY MASS INDEX: 24.1 KG/M2 | SYSTOLIC BLOOD PRESSURE: 124 MMHG | WEIGHT: 141.19 LBS | HEIGHT: 64 IN | HEART RATE: 74 BPM

## 2022-07-21 DIAGNOSIS — Z12.31 ENCOUNTER FOR SCREENING MAMMOGRAM FOR HIGH-RISK PATIENT: ICD-10-CM

## 2022-07-21 DIAGNOSIS — Z00.00 ANNUAL PHYSICAL EXAM: Primary | ICD-10-CM

## 2022-07-21 DIAGNOSIS — L25.9 CONTACT DERMATITIS AND ECZEMA: ICD-10-CM

## 2022-07-21 DIAGNOSIS — E55.9 VITAMIN D DEFICIENCY: ICD-10-CM

## 2022-07-21 DIAGNOSIS — Z23 NEED FOR VACCINATION: Primary | ICD-10-CM

## 2022-07-21 DIAGNOSIS — Z80.0 FAMILY HISTORY OF COLON CANCER: ICD-10-CM

## 2022-07-21 DIAGNOSIS — Z87.19 HISTORY OF DIVERTICULITIS: ICD-10-CM

## 2022-07-21 DIAGNOSIS — Z91.89 AT HIGH RISK FOR BREAST CANCER: ICD-10-CM

## 2022-07-21 PROBLEM — K57.92 DIVERTICULITIS: Status: RESOLVED | Noted: 2017-04-06 | Resolved: 2022-07-21

## 2022-07-21 PROCEDURE — 3079F DIAST BP 80-89 MM HG: CPT | Mod: CPTII,S$GLB,, | Performed by: INTERNAL MEDICINE

## 2022-07-21 PROCEDURE — 3074F SYST BP LT 130 MM HG: CPT | Mod: CPTII,S$GLB,, | Performed by: INTERNAL MEDICINE

## 2022-07-21 PROCEDURE — 99396 PREV VISIT EST AGE 40-64: CPT | Mod: 25,S$GLB,, | Performed by: INTERNAL MEDICINE

## 2022-07-21 PROCEDURE — 1159F PR MEDICATION LIST DOCUMENTED IN MEDICAL RECORD: ICD-10-PCS | Mod: CPTII,S$GLB,, | Performed by: INTERNAL MEDICINE

## 2022-07-21 PROCEDURE — 3074F PR MOST RECENT SYSTOLIC BLOOD PRESSURE < 130 MM HG: ICD-10-PCS | Mod: CPTII,S$GLB,, | Performed by: INTERNAL MEDICINE

## 2022-07-21 PROCEDURE — 99212 OFFICE O/P EST SF 10 MIN: CPT | Mod: 25,S$GLB,, | Performed by: INTERNAL MEDICINE

## 2022-07-21 PROCEDURE — 99396 PR PREVENTIVE VISIT,EST,40-64: ICD-10-PCS | Mod: 25,S$GLB,, | Performed by: INTERNAL MEDICINE

## 2022-07-21 PROCEDURE — 0054A COVID-19, MRNA, LNP-S, PF, 30 MCG/0.3 ML DOSE VACCINE (PFIZER): CPT | Mod: PBBFAC | Performed by: INTERNAL MEDICINE

## 2022-07-21 PROCEDURE — 3079F PR MOST RECENT DIASTOLIC BLOOD PRESSURE 80-89 MM HG: ICD-10-PCS | Mod: CPTII,S$GLB,, | Performed by: INTERNAL MEDICINE

## 2022-07-21 PROCEDURE — 3008F PR BODY MASS INDEX (BMI) DOCUMENTED: ICD-10-PCS | Mod: CPTII,S$GLB,, | Performed by: INTERNAL MEDICINE

## 2022-07-21 PROCEDURE — 99999 PR PBB SHADOW E&M-EST. PATIENT-LVL III: ICD-10-PCS | Mod: PBBFAC,,, | Performed by: INTERNAL MEDICINE

## 2022-07-21 PROCEDURE — 1159F MED LIST DOCD IN RCRD: CPT | Mod: CPTII,S$GLB,, | Performed by: INTERNAL MEDICINE

## 2022-07-21 PROCEDURE — 99212 PR OFFICE/OUTPT VISIT, EST, LEVL II, 10-19 MIN: ICD-10-PCS | Mod: 25,S$GLB,, | Performed by: INTERNAL MEDICINE

## 2022-07-21 PROCEDURE — 3008F BODY MASS INDEX DOCD: CPT | Mod: CPTII,S$GLB,, | Performed by: INTERNAL MEDICINE

## 2022-07-21 PROCEDURE — 99999 PR PBB SHADOW E&M-EST. PATIENT-LVL III: CPT | Mod: PBBFAC,,, | Performed by: INTERNAL MEDICINE

## 2022-07-21 PROCEDURE — 3044F HG A1C LEVEL LT 7.0%: CPT | Mod: CPTII,S$GLB,, | Performed by: INTERNAL MEDICINE

## 2022-07-21 PROCEDURE — 3044F PR MOST RECENT HEMOGLOBIN A1C LEVEL <7.0%: ICD-10-PCS | Mod: CPTII,S$GLB,, | Performed by: INTERNAL MEDICINE

## 2022-07-21 RX ORDER — TRIAMCINOLONE ACETONIDE 1 MG/G
CREAM TOPICAL 2 TIMES DAILY
Qty: 45 G | Refills: 0 | Status: SHIPPED | OUTPATIENT
Start: 2022-07-21 | End: 2023-09-22

## 2022-07-21 NOTE — PROGRESS NOTES
Subjective:       Patient ID: Xochitl Weiss is a 61 y.o. female.    Chief Complaint: Annual Exam     Xochitl Weiss is a 61 y.o.  female who presents for Annual Exam  .   Xochitl Weiss is a 61 y.o.  female who presents for Annual Exam  .  C/o of a new onset rash between fingers, started a couple of days ago after using perfumed soap, a known irritant to her.  She reports a previous episode of contact dermitits in past and was treated by outside dermatology. She does not recall the topical  medication she used previously but she responded well. REview of Epic outside derm data does not show diagnoses consisent with this hisotory during her visits with Dr Levi.     The 10-year ASCVD risk score (Eugenekarolina MOODY Jr., et al., 2013) is: 2.6%    Values used to calculate the score:      Age: 61 years      Sex: Female      Is Non- : No      Diabetic: No      Tobacco smoker: No      Systolic Blood Pressure: 124 mmHg      Is BP treated: No      HDL Cholesterol: 72 mg/dL      Total Cholesterol: 228 mg/dL      Review of Systems   Constitutional: Negative for chills, fever and unexpected weight change.   HENT: Negative for ear pain and sore throat.    Respiratory: Negative for cough and shortness of breath.    Cardiovascular: Negative for chest pain and palpitations.   Gastrointestinal: Negative for abdominal pain, nausea and vomiting.   Musculoskeletal: Negative for arthralgias and myalgias.   Skin: Positive for rash. Negative for wound.       Problem Noted   At High Risk for Breast Cancer 10/7/2019   Family History of Breast Cancer 4/6/2017    Mother, aunt  Also ovarian, lung and colon in family     Premature Menopause On Hrt 4/6/2017   Hemorrhoids, Internal 4/6/2017   History of Diverticulitis 4/6/2017    Right sided as well as left sided in past     Family History of Colon Cancer 1/10/2013    Negative for Stephen syndrome     Diverticulitis (Resolved) 4/6/2017    Two previous episodes     Kidney  Stones (Resolved) 2017    One episode in past     Trigger Finger of Right Thumb (Resolved) 3/17/2017   Muscle Strain (Resolved) 2013       Past Medical History:   Diagnosis Date    BRCA negative     Colon polyp     Diverticula of colon     Diverticulitis     Family history of breast cancer     mother    Family history of colon cancer     2 family members over age 60    General anesthetics causing adverse effect in therapeutic use     per patient report slow to awaken    Genetic testing     negative Integrated BRACAnalysis    GERD (gastroesophageal reflux disease)     Kidney stones     Mild hyperlipidemia        Past Surgical History:   Procedure Laterality Date    breast augmentation      BREAST CYST EXCISION Left 2021    BREAST SURGERY      implant removal in 2020     SECTION      x 2    COLONOSCOPY      COLONOSCOPY N/A 3/8/2017    Procedure: COLONOSCOPY;  Surgeon: Torey Macias MD;  Location: [facility] ENDO (4TH FLR);  Service: Endoscopy;  Laterality: N/A;    COLONOSCOPY N/A 2019    Procedure: COLONOSCOPY;  Surgeon: Torey Macias MD;  Location: [facility] ENDO (4TH FLR);  Service: Endoscopy;  Laterality: N/A;    COLONOSCOPY N/A 3/31/2022    Procedure: COLONOSCOPY;  Surgeon: Torey Macias MD;  Location: [facility] ENDO (4TH FLR);  Service: Endoscopy;  Laterality: N/A;   covid test 3/28 @ [place]; instructions to [place]    ESOPHAGOGASTRODUODENOSCOPY N/A 10/26/2021    Procedure: EGD (ESOPHAGOGASTRODUODENOSCOPY);  Surgeon: Torey Macias MD;  Location: [facility] ENDO (4TH FLR);  Service: Endoscopy;  Laterality: N/A;  Covid test 10/23 [place], instructions sent to [facility]    EYE SURGERY      blephroplasty    HYSTERECTOMY      AUB    OOPHORECTOMY      TONSILLECTOMY  1979    TRIGGER FINGER RELEASE Right 2017    thumb and 3rd digit    TUBAL LIGATION         Family History   Problem Relation Age of Onset    Colon cancer Father 68    Cancer Father   "       Colon    Breast cancer Mother 68    Colon cancer Mother 70        twice    Uterine cancer Mother 30    Diabetes Mother     Hypertension Mother     Cancer Mother         Uterine, Breast & Colon    Ovarian cancer Maternal Aunt 60    Lung cancer Maternal Uncle 60    No Known Problems Daughter     No Known Problems Son      labor Neg Hx     Eclampsia Neg Hx     Pancreatic cancer Neg Hx     Kidney cancer Neg Hx     Celiac disease Neg Hx     Cirrhosis Neg Hx     Colon polyps Neg Hx     Crohn's disease Neg Hx     Esophageal cancer Neg Hx     Inflammatory bowel disease Neg Hx     Liver cancer Neg Hx     Liver disease Neg Hx     Rectal cancer Neg Hx     Stomach cancer Neg Hx     Ulcerative colitis Neg Hx        Social History     Tobacco Use    Smoking status: Never Smoker    Smokeless tobacco: Never Used   Substance Use Topics    Alcohol use: Yes     Alcohol/week: 6.0 standard drinks     Types: 6 Glasses of wine per week     Comment: socially    Drug use: No       Objective:   Blood pressure 124/80, pulse 74, height 5' 4" (1.626 m), weight 64 kg (141 lb 3.3 oz), SpO2 97 %.     Physical Exam  Constitutional:       Appearance: Normal appearance. She is well-developed. She is not ill-appearing.   HENT:      Head: Normocephalic and atraumatic.   Eyes:      General: No scleral icterus.     Conjunctiva/sclera: Conjunctivae normal.   Cardiovascular:      Rate and Rhythm: Normal rate.      Heart sounds: Normal heart sounds. No murmur heard.    No friction rub. No gallop.   Pulmonary:      Effort: Pulmonary effort is normal.      Breath sounds: Normal breath sounds. No wheezing or rales.   Chest:      Chest wall: No tenderness.   Abdominal:      General: Bowel sounds are normal.      Palpations: Abdomen is soft.   Musculoskeletal:         General: No tenderness or signs of injury.   Skin:     General: Skin is warm and dry.      Findings: Rash (see photo) present.   Neurological:      Mental " Status: She is alert and oriented to person, place, and time. Mental status is at baseline.   Psychiatric:         Behavior: Behavior normal.         Thought Content: Thought content normal.              Prior labs reviewed  Assessment/Plan:        Xochitl was seen today for annual exam.    Diagnoses and all orders for this visit:    Annual physical exam  Recommend daily sunscreen, cardiovascular exercise min 30 min 5 days per week. Seatbelts routinely. Masks over mouth and nose when in contact with others outside of your immediate household will reduce risk of covid. Wash hands and surfaces frequently to avoid contact with viruses.  Recommend monthly self breast exams and annual mammogram OVER AGE 40 or as recommended.  Also screening  pap with reflex HPV q 3 years or as recommended by gyn provider.      Contact dermatitis and eczema  -     7- 10 days triamcinolone acetonide 0.1% (KENALOG) 0.1 % cream; Apply topically 2 (two) times daily.  Avoid known irritants  Emollient barrier cream prn such as aquaphor   Follow up with derm if symptoms do not improve  Mild steroid used due to risk of accidental contact with facial skin by hands    History of diverticulitis  Recent colonoscopy clear  Stable    Encounter for screening mammogram for high-risk patient  -     Mammo Digital Screening Bilat w/ [facility]; Future      Family history of colon cancer  Up to date on screening  Followed by dr kenya granger    Vitamin D deficiency  Comments:  Recommend daily vit d3 2000 IU     At high risk for breast cancer  Comments:  score lowered on recent imaging  schedule 3 d mammo  Neg brca   Cont monthly self breast examps    GERD- on chronic PPI  Add mg, b12 and vit d to annual labs    ADDENDUM: hba1c 5.7, c/w prediabetes. PT informed via my chart. Recommend low glycemic index diet, regular exercise and 5-10 pound wt loss. Repeat labs on rtc in one year.      Medication List with Changes/Refills   New Medications    TRIAMCINOLONE  ACETONIDE 0.1% (KENALOG) 0.1 % CREAM    Apply topically 2 (two) times daily.   Current Medications    ESTRADIOL (VAGIFEM) 10 MCG TAB    Place 1 tablet (10 mcg total) vaginally twice a week.    PANTOPRAZOLE (PROTONIX) 20 MG TABLET    Take 1 tablet (20 mg total) by mouth before breakfast.

## 2022-07-26 ENCOUNTER — PATIENT MESSAGE (OUTPATIENT)
Dept: INTERNAL MEDICINE | Facility: CLINIC | Age: 62
End: 2022-07-26
Payer: COMMERCIAL

## 2022-07-26 DIAGNOSIS — Z00.00 ANNUAL PHYSICAL EXAM: Primary | ICD-10-CM

## 2022-07-29 ENCOUNTER — LAB VISIT (OUTPATIENT)
Dept: LAB | Facility: OTHER | Age: 62
End: 2022-07-29
Attending: INTERNAL MEDICINE
Payer: COMMERCIAL

## 2022-07-29 DIAGNOSIS — Z00.00 ANNUAL PHYSICAL EXAM: ICD-10-CM

## 2022-07-29 LAB
ALBUMIN SERPL BCP-MCNC: 3.9 G/DL (ref 3.5–5.2)
ALP SERPL-CCNC: 68 U/L (ref 55–135)
ALT SERPL W/O P-5'-P-CCNC: 13 U/L (ref 10–44)
ANION GAP SERPL CALC-SCNC: 9 MMOL/L (ref 8–16)
AST SERPL-CCNC: 17 U/L (ref 10–40)
BASOPHILS # BLD AUTO: 0.04 K/UL (ref 0–0.2)
BASOPHILS NFR BLD: 0.7 % (ref 0–1.9)
BILIRUB SERPL-MCNC: 0.5 MG/DL (ref 0.1–1)
BUN SERPL-MCNC: 13 MG/DL (ref 6–20)
CALCIUM SERPL-MCNC: 9.1 MG/DL (ref 8.7–10.5)
CHLORIDE SERPL-SCNC: 109 MMOL/L (ref 95–110)
CHOLEST SERPL-MCNC: 242 MG/DL (ref 120–199)
CHOLEST/HDLC SERPL: 3.2 {RATIO} (ref 2–5)
CO2 SERPL-SCNC: 24 MMOL/L (ref 23–29)
CREAT SERPL-MCNC: 0.8 MG/DL (ref 0.5–1.4)
DIFFERENTIAL METHOD: ABNORMAL
EOSINOPHIL # BLD AUTO: 0.3 K/UL (ref 0–0.5)
EOSINOPHIL NFR BLD: 5.5 % (ref 0–8)
ERYTHROCYTE [DISTWIDTH] IN BLOOD BY AUTOMATED COUNT: 12.2 % (ref 11.5–14.5)
EST. GFR  (AFRICAN AMERICAN): >60 ML/MIN/1.73 M^2
EST. GFR  (NON AFRICAN AMERICAN): >60 ML/MIN/1.73 M^2
ESTIMATED AVG GLUCOSE: 117 MG/DL (ref 68–131)
GLUCOSE SERPL-MCNC: 102 MG/DL (ref 70–110)
HBA1C MFR BLD: 5.7 % (ref 4–5.6)
HCT VFR BLD AUTO: 42.2 % (ref 37–48.5)
HDLC SERPL-MCNC: 76 MG/DL (ref 40–75)
HDLC SERPL: 31.4 % (ref 20–50)
HGB BLD-MCNC: 14.3 G/DL (ref 12–16)
IMM GRANULOCYTES # BLD AUTO: 0.01 K/UL (ref 0–0.04)
IMM GRANULOCYTES NFR BLD AUTO: 0.2 % (ref 0–0.5)
LDLC SERPL CALC-MCNC: 147 MG/DL (ref 63–159)
LYMPHOCYTES # BLD AUTO: 2 K/UL (ref 1–4.8)
LYMPHOCYTES NFR BLD: 33.9 % (ref 18–48)
MCH RBC QN AUTO: 29.5 PG (ref 27–31)
MCHC RBC AUTO-ENTMCNC: 33.9 G/DL (ref 32–36)
MCV RBC AUTO: 87 FL (ref 82–98)
MONOCYTES # BLD AUTO: 0.5 K/UL (ref 0.3–1)
MONOCYTES NFR BLD: 8.4 % (ref 4–15)
NEUTROPHILS # BLD AUTO: 3.1 K/UL (ref 1.8–7.7)
NEUTROPHILS NFR BLD: 51.3 % (ref 38–73)
NONHDLC SERPL-MCNC: 166 MG/DL
NRBC BLD-RTO: 0 /100 WBC
PLATELET # BLD AUTO: 262 K/UL (ref 150–450)
PMV BLD AUTO: 8.6 FL (ref 9.2–12.9)
POTASSIUM SERPL-SCNC: 4.2 MMOL/L (ref 3.5–5.1)
PROT SERPL-MCNC: 6.9 G/DL (ref 6–8.4)
RBC # BLD AUTO: 4.84 M/UL (ref 4–5.4)
SODIUM SERPL-SCNC: 142 MMOL/L (ref 136–145)
TRIGL SERPL-MCNC: 95 MG/DL (ref 30–150)
TSH SERPL DL<=0.005 MIU/L-ACNC: 2.4 UIU/ML (ref 0.4–4)
WBC # BLD AUTO: 5.98 K/UL (ref 3.9–12.7)

## 2022-07-29 PROCEDURE — 83036 HEMOGLOBIN GLYCOSYLATED A1C: CPT | Performed by: INTERNAL MEDICINE

## 2022-07-29 PROCEDURE — 80061 LIPID PANEL: CPT | Performed by: INTERNAL MEDICINE

## 2022-07-29 PROCEDURE — 85025 COMPLETE CBC W/AUTO DIFF WBC: CPT | Performed by: INTERNAL MEDICINE

## 2022-07-29 PROCEDURE — 80053 COMPREHEN METABOLIC PANEL: CPT | Performed by: INTERNAL MEDICINE

## 2022-07-29 PROCEDURE — 84443 ASSAY THYROID STIM HORMONE: CPT | Performed by: INTERNAL MEDICINE

## 2022-07-29 PROCEDURE — 36415 COLL VENOUS BLD VENIPUNCTURE: CPT | Performed by: INTERNAL MEDICINE

## 2022-07-31 ENCOUNTER — PATIENT MESSAGE (OUTPATIENT)
Dept: INTERNAL MEDICINE | Facility: CLINIC | Age: 62
End: 2022-07-31
Payer: COMMERCIAL

## 2022-08-23 ENCOUNTER — HOSPITAL ENCOUNTER (OUTPATIENT)
Dept: RADIOLOGY | Facility: OTHER | Age: 62
Discharge: HOME | End: 2022-08-23
Attending: INTERNAL MEDICINE
Payer: COMMERCIAL

## 2022-08-23 DIAGNOSIS — Z12.31 ENCOUNTER FOR SCREENING MAMMOGRAM FOR HIGH-RISK PATIENT: ICD-10-CM

## 2022-08-23 PROCEDURE — 77063 BREAST TOMOSYNTHESIS BI: CPT | Mod: TC

## 2022-08-23 PROCEDURE — 77063 BREAST TOMOSYNTHESIS BI: CPT | Mod: 26,,, | Performed by: RADIOLOGY

## 2022-08-23 PROCEDURE — 77067 SCR MAMMO BI INCL CAD: CPT | Mod: TC

## 2022-08-23 PROCEDURE — 77067 SCR MAMMO BI INCL CAD: CPT | Mod: 26,,, | Performed by: RADIOLOGY

## 2022-08-23 PROCEDURE — 77063 MAMMO DIGITAL SCREENING BILAT WITH TOMO: ICD-10-PCS | Mod: 26,,, | Performed by: RADIOLOGY

## 2022-08-23 PROCEDURE — 77067 MAMMO DIGITAL SCREENING BILAT WITH TOMO: ICD-10-PCS | Mod: 26,,, | Performed by: RADIOLOGY

## 2022-09-12 ENCOUNTER — OFFICE VISIT (OUTPATIENT)
Dept: OBSTETRICS AND GYNECOLOGY | Facility: CLINIC | Age: 62
End: 2022-09-12
Attending: OBSTETRICS & GYNECOLOGY
Payer: COMMERCIAL

## 2022-09-12 VITALS
WEIGHT: 131.38 LBS | SYSTOLIC BLOOD PRESSURE: 102 MMHG | DIASTOLIC BLOOD PRESSURE: 68 MMHG | BODY MASS INDEX: 22.55 KG/M2

## 2022-09-12 DIAGNOSIS — Z91.89 AT HIGH RISK FOR BREAST CANCER: ICD-10-CM

## 2022-09-12 DIAGNOSIS — N95.2 VAGINAL ATROPHY: ICD-10-CM

## 2022-09-12 DIAGNOSIS — Z01.419 WELL WOMAN EXAM: Primary | ICD-10-CM

## 2022-09-12 PROCEDURE — 3078F DIAST BP <80 MM HG: CPT | Mod: CPTII,S$GLB,, | Performed by: OBSTETRICS & GYNECOLOGY

## 2022-09-12 PROCEDURE — 99999 PR PBB SHADOW E&M-EST. PATIENT-LVL III: CPT | Mod: PBBFAC,,, | Performed by: OBSTETRICS & GYNECOLOGY

## 2022-09-12 PROCEDURE — 1159F PR MEDICATION LIST DOCUMENTED IN MEDICAL RECORD: ICD-10-PCS | Mod: CPTII,S$GLB,, | Performed by: OBSTETRICS & GYNECOLOGY

## 2022-09-12 PROCEDURE — 3008F PR BODY MASS INDEX (BMI) DOCUMENTED: ICD-10-PCS | Mod: CPTII,S$GLB,, | Performed by: OBSTETRICS & GYNECOLOGY

## 2022-09-12 PROCEDURE — 3078F PR MOST RECENT DIASTOLIC BLOOD PRESSURE < 80 MM HG: ICD-10-PCS | Mod: CPTII,S$GLB,, | Performed by: OBSTETRICS & GYNECOLOGY

## 2022-09-12 PROCEDURE — 3044F HG A1C LEVEL LT 7.0%: CPT | Mod: CPTII,S$GLB,, | Performed by: OBSTETRICS & GYNECOLOGY

## 2022-09-12 PROCEDURE — 3074F SYST BP LT 130 MM HG: CPT | Mod: CPTII,S$GLB,, | Performed by: OBSTETRICS & GYNECOLOGY

## 2022-09-12 PROCEDURE — 1160F RVW MEDS BY RX/DR IN RCRD: CPT | Mod: CPTII,S$GLB,, | Performed by: OBSTETRICS & GYNECOLOGY

## 2022-09-12 PROCEDURE — 3074F PR MOST RECENT SYSTOLIC BLOOD PRESSURE < 130 MM HG: ICD-10-PCS | Mod: CPTII,S$GLB,, | Performed by: OBSTETRICS & GYNECOLOGY

## 2022-09-12 PROCEDURE — 99999 PR PBB SHADOW E&M-EST. PATIENT-LVL III: ICD-10-PCS | Mod: PBBFAC,,, | Performed by: OBSTETRICS & GYNECOLOGY

## 2022-09-12 PROCEDURE — 3044F PR MOST RECENT HEMOGLOBIN A1C LEVEL <7.0%: ICD-10-PCS | Mod: CPTII,S$GLB,, | Performed by: OBSTETRICS & GYNECOLOGY

## 2022-09-12 PROCEDURE — 99396 PR PREVENTIVE VISIT,EST,40-64: ICD-10-PCS | Mod: S$GLB,,, | Performed by: OBSTETRICS & GYNECOLOGY

## 2022-09-12 PROCEDURE — 3008F BODY MASS INDEX DOCD: CPT | Mod: CPTII,S$GLB,, | Performed by: OBSTETRICS & GYNECOLOGY

## 2022-09-12 PROCEDURE — 1159F MED LIST DOCD IN RCRD: CPT | Mod: CPTII,S$GLB,, | Performed by: OBSTETRICS & GYNECOLOGY

## 2022-09-12 PROCEDURE — 99396 PREV VISIT EST AGE 40-64: CPT | Mod: S$GLB,,, | Performed by: OBSTETRICS & GYNECOLOGY

## 2022-09-12 PROCEDURE — 1160F PR REVIEW ALL MEDS BY PRESCRIBER/CLIN PHARMACIST DOCUMENTED: ICD-10-PCS | Mod: CPTII,S$GLB,, | Performed by: OBSTETRICS & GYNECOLOGY

## 2022-09-12 NOTE — PROGRESS NOTES
CC: Well woman exam    Xochitl Weiss is a 61 y.o. female  status post hyst presents for a well woman exam.  No current issues, problems, or complaints.      Previously used oral ERT  Has been using vaginal estrogen since her hysterectomy and doing well on it.   Doesn't use it twice weekly because expensive.  She will check which vaginal estrogen her insurance covers and let me know.     Patient's mother had breast cancer (age 68), uterine cancer (age 32), colon cancer x 2  Patient's father with colon cancer  Maternal aunt - ovarian cancer;  diagnosed in her 60s  Maternal uncle - lung cancer (smoker)    Has regular colonoscopies (every 3-5 years)  Did genetic testing - negative for Stephen & BRCA years ago    Breast clinic previously recommended breast MRI alternating with MMG due to FH and elevated T/C score.    Past Medical History:   Diagnosis Date    BRCA negative     Colon polyp     Diverticula of colon     Diverticulitis     Family history of breast cancer     mother    Family history of colon cancer     2 family members over age 60    General anesthetics causing adverse effect in therapeutic use     per patient report slow to awaken    Genetic testing     negative Integrated BRACAnalysis    GERD (gastroesophageal reflux disease)     Kidney stones     Mild hyperlipidemia      Past Surgical History:   Procedure Laterality Date    breast augmentation      BREAST CYST EXCISION Left 2021    BREAST SURGERY      implant removal in 2020     SECTION      x 2    COLONOSCOPY      COLONOSCOPY N/A 3/8/2017    Procedure: COLONOSCOPY;  Surgeon: Torey Macias MD;  Location: [address]);  Service: Endoscopy;  Laterality: N/A;    COLONOSCOPY N/A 2019    Procedure: COLONOSCOPY;  Surgeon: Torey Macias MD;  Location: [address]);  Service: Endoscopy;  Laterality: N/A;    COLONOSCOPY N/A 3/31/2022    Procedure: COLONOSCOPY;  Surgeon: Torey Macias MD;  Location: [facility]  ENDO (4TH FLR);  Service: Endoscopy;  Laterality: N/A;   covid test 3/28 @ [place]; instructions to [facility]    ESOPHAGOGASTRODUODENOSCOPY N/A 10/26/2021    Procedure: EGD (ESOPHAGOGASTRODUODENOSCOPY);  Surgeon: Torey Macias MD;  Location: [facility] (4TH FLR);  Service: Endoscopy;  Laterality: N/A;  Covid test 10/23 [place], instructions sent to [facility]    EYE SURGERY      blephroplasty    HYSTERECTOMY      AUB    OOPHORECTOMY      TONSILLECTOMY  1979    TRIGGER FINGER RELEASE Right 2017    thumb and 3rd digit    TUBAL LIGATION       Family History   Problem Relation Age of Onset    Colon cancer Father 68    Cancer Father         Colon    Breast cancer Mother 68    Colon cancer Mother 70        twice    Uterine cancer Mother 30    Diabetes Mother     Hypertension Mother     Cancer Mother         Uterine, Breast & Colon    Ovarian cancer Maternal Aunt 60    Lung cancer Maternal Uncle 60    No Known Problems Daughter     No Known Problems Son      labor Neg Hx     Eclampsia Neg Hx     Pancreatic cancer Neg Hx     Kidney cancer Neg Hx     Celiac disease Neg Hx     Cirrhosis Neg Hx     Colon polyps Neg Hx     Crohn's disease Neg Hx     Esophageal cancer Neg Hx     Inflammatory bowel disease Neg Hx     Liver cancer Neg Hx     Liver disease Neg Hx     Rectal cancer Neg Hx     Stomach cancer Neg Hx     Ulcerative colitis Neg Hx      Social History     Tobacco Use    Smoking status: Never    Smokeless tobacco: Never   Substance Use Topics    Alcohol use: Yes     Alcohol/week: 4.0 standard drinks     Types: 4 Glasses of wine per week     Comment: socially    Drug use: No     OB History          2    Para   2    Term   2            AB        Living   2         SAB        IAB        Ectopic        Multiple        Live Births                     /[birth year] (BP Location: Left arm, Patient Position: Sitting, BP Method: Medium (Automatic))   Wt 59.6 kg (131 lb 6.3 oz)   BMI 22.55 kg/m²      ROS:    GENERAL: Denies weight gain or weight loss. Feeling well overall.   SKIN: Denies rash or lesions.   HEAD: Denies head injury or headache.   NODES: Denies enlarged lymph nodes.   CHEST: Denies chest pain or shortness of breath.   CARDIOVASCULAR: Denies palpitations or left sided chest pain.   ABDOMEN: No abdominal pain, constipation, diarrhea, nausea, vomiting or rectal bleeding.   URINARY: No frequency, dysuria, hematuria, or burning on urination.  REPRODUCTIVE: See HPI.   BREASTS: The patient performs breast self-examination and denies pain, lumps, or nipple discharge.   HEMATOLOGIC: No easy bruisability or excessive bleeding.   MUSCULOSKELETAL: Denies joint pain or swelling.   NEUROLOGIC: Denies syncope or weakness.   PSYCHIATRIC: Denies depression, anxiety or mood swings.      PHYSICAL EXAM:     APPEARANCE: Well nourished, well developed, in no acute distress.  AFFECT: WNL, alert and oriented x 3.  SKIN: No acne or hirsutism.  NECK: Neck symmetric without masses or thyromegaly.  NODES: No inguinal, cervical, axillary or femoral lymph node enlargement.  CHEST: Good respiratory effort.   ABDOMEN: Soft. No tenderness or masses. No hepatosplenomegaly. No hernias.  BREASTS: Symmetrical, no skin changes or visible lesions. No palpable masses, nipple discharge bilaterally.  PELVIC: Normal external female genitalia without lesions. Normal hair distribution. Adequate perineal body, normal urethral meatus. Vagina atrophic without lesions or discharge. No significant cystocele or rectocele. Bimanual exam shows uterus and cervix to be surgically absent. Adnexa without masses or tenderness.  EXTREMITIES: No edema.    ASSESSMENT & PLAN    ICD-10-CM ICD-9-CM    1. Well woman exam  Z01.419 V72.31       2. At high risk for breast cancer  Z91.89 V49.89 Ambulatory referral/consult to Breast Surgery      3. Vaginal atrophy  N95.2 627.3              Will send in vaginal ERT rx when patient lets me know which is least  expensive.  Patient was counseled today on A.C.S. Pap guidelines and recommendations for yearly pelvic exams, mammograms and monthly self breast exams; to see her PCP for other health maintenance.

## 2022-10-18 ENCOUNTER — OFFICE VISIT (OUTPATIENT)
Dept: HEMATOLOGY/ONCOLOGY | Facility: CLINIC | Age: 62
End: 2022-10-18
Payer: COMMERCIAL

## 2022-10-18 VITALS
BODY MASS INDEX: 21.85 KG/M2 | RESPIRATION RATE: 18 BRPM | DIASTOLIC BLOOD PRESSURE: 58 MMHG | WEIGHT: 128 LBS | SYSTOLIC BLOOD PRESSURE: 104 MMHG | HEART RATE: 75 BPM | TEMPERATURE: 98 F | HEIGHT: 64 IN | OXYGEN SATURATION: 96 %

## 2022-10-18 DIAGNOSIS — R92.30 DENSE BREAST TISSUE ON MAMMOGRAM: ICD-10-CM

## 2022-10-18 DIAGNOSIS — Z91.89 AT HIGH RISK FOR BREAST CANCER: Primary | ICD-10-CM

## 2022-10-18 DIAGNOSIS — Z80.3 FAMILY HISTORY OF BREAST CANCER IN FIRST DEGREE RELATIVE: ICD-10-CM

## 2022-10-18 PROCEDURE — 3074F SYST BP LT 130 MM HG: CPT | Mod: CPTII,S$GLB,, | Performed by: NURSE PRACTITIONER

## 2022-10-18 PROCEDURE — 3078F PR MOST RECENT DIASTOLIC BLOOD PRESSURE < 80 MM HG: ICD-10-PCS | Mod: CPTII,S$GLB,, | Performed by: NURSE PRACTITIONER

## 2022-10-18 PROCEDURE — 3044F HG A1C LEVEL LT 7.0%: CPT | Mod: CPTII,S$GLB,, | Performed by: NURSE PRACTITIONER

## 2022-10-18 PROCEDURE — 99214 OFFICE O/P EST MOD 30 MIN: CPT | Mod: S$GLB,,, | Performed by: NURSE PRACTITIONER

## 2022-10-18 PROCEDURE — 99214 PR OFFICE/OUTPT VISIT, EST, LEVL IV, 30-39 MIN: ICD-10-PCS | Mod: S$GLB,,, | Performed by: NURSE PRACTITIONER

## 2022-10-18 PROCEDURE — 3044F PR MOST RECENT HEMOGLOBIN A1C LEVEL <7.0%: ICD-10-PCS | Mod: CPTII,S$GLB,, | Performed by: NURSE PRACTITIONER

## 2022-10-18 PROCEDURE — 1159F PR MEDICATION LIST DOCUMENTED IN MEDICAL RECORD: ICD-10-PCS | Mod: CPTII,S$GLB,, | Performed by: NURSE PRACTITIONER

## 2022-10-18 PROCEDURE — 99999 PR PBB SHADOW E&M-EST. PATIENT-LVL III: ICD-10-PCS | Mod: PBBFAC,,, | Performed by: NURSE PRACTITIONER

## 2022-10-18 PROCEDURE — 1159F MED LIST DOCD IN RCRD: CPT | Mod: CPTII,S$GLB,, | Performed by: NURSE PRACTITIONER

## 2022-10-18 PROCEDURE — 99999 PR PBB SHADOW E&M-EST. PATIENT-LVL III: CPT | Mod: PBBFAC,,, | Performed by: NURSE PRACTITIONER

## 2022-10-18 PROCEDURE — 3074F PR MOST RECENT SYSTOLIC BLOOD PRESSURE < 130 MM HG: ICD-10-PCS | Mod: CPTII,S$GLB,, | Performed by: NURSE PRACTITIONER

## 2022-10-18 PROCEDURE — 3078F DIAST BP <80 MM HG: CPT | Mod: CPTII,S$GLB,, | Performed by: NURSE PRACTITIONER

## 2022-10-18 NOTE — PROGRESS NOTES
"  Reason For Consultation:   Increased lifetime risk of breast cancer    Referring Provider:   Gabrielle Russo MD  [address] [place] [address]    Records Obtained: Records of the patients history including those obtained from the referring provider were reviewed and summarized in detail.    HPI:   Xochitl Weiss presents for consultation of increased risk of breast cancer. She is postmenopausal. She presented for screening mammogram on 22 which was benign but revealed a Tyrer-Cuzick score of 14.87%.  She has been seen in the high risk breast clinic in the past, last in 2018    Today, Feels good and no complaints.   No breast concerns.  Had breast implants removed in 2021  Recently had about 13 lb wt loss, was actively trying to lose wt    High Risk Breast cancer specific history:  - Age: 61 y.o.   - Height:  5'4"  - Weight: 127 lbs  - Breast density per BI-RADS:  Heterogeneously dense  - Age at menarche:  15  - Number of pregnancies: ; age of first live birth: 29   - History of breast feeding: Yes - about 8 weeks x2.   - Age at menopause, if applicable:  34, total hysterectomy  -Uterus and ovaries intact: No: hysterectomy at 34  - HRT: Yes - currently using estradiol inserts, took oral hormone replacement therapy in the past, stopped oral hrt more than 5 years ago      - Genetic testing: Yes - brca 1/2 testing back in , negative, also negative for granger mutation  - Personal history of cancer: No  - Previous chest radiation exposure between ages 10-30 years old: No  - Personal history of breast biopsy: Yes - 2, one was a lipoma, both benign, no atypia  - Ashkenazi Confucianism Inheritance: No  - Family history of cancer:  Mother: breast cancer, diagnosed at 68,  at 81, also had colon cancer, and uterine cancer which was diagnosed under 40  Maternal aunt: ovarian cancer  Maternal uncle: lung cancer  Father: colon cancer    Social History:  Tobacco use:  none  Alcohol use:  " socially, about 2 per week  Exercise regimen: three times per week, plays gold twice weekly and rides stationary bike  Employment: retired    SEE CALCULATED RISK BELOW.     Past Medical   Past Medical History:   Diagnosis Date    BRCA negative     Colon polyp     Diverticula of colon     Diverticulitis     Family history of breast cancer     mother    Family history of colon cancer     2 family members over age 60    General anesthetics causing adverse effect in therapeutic use     per patient report slow to awaken    Genetic testing     negative Integrated BRACAnalysis    GERD (gastroesophageal reflux disease)     Kidney stones     Mild hyperlipidemia      Patient Active Problem List   Diagnosis    Encounter for long-term (current) use of other medications    Family history of colon cancer    GERD (gastroesophageal reflux disease)    Hydronephrosis    Family history of breast cancer    Premature menopause on HRT    Hemorrhoids, internal    History of diverticulitis    Food impaction of esophagus    Esophageal foreign body    Recurrent UTI    Dysphagia    At high risk for breast cancer    Family history of colon cancer in father    Reflux esophagitis     Social History   Social History     Tobacco Use    Smoking status: Never    Smokeless tobacco: Never   Substance Use Topics    Alcohol use: Yes     Alcohol/week: 4.0 standard drinks     Types: 4 Glasses of wine per week     Comment: socially    Drug use: No     Family History  Family History   Problem Relation Age of Onset    Colon cancer Father 68    Cancer Father         Colon    Breast cancer Mother 68    Colon cancer Mother 70        twice    Uterine cancer Mother 30    Diabetes Mother     Hypertension Mother     Cancer Mother         Uterine, Breast & Colon    Ovarian cancer Maternal Aunt 60    Lung cancer Maternal Uncle 60    No Known Problems Daughter     No Known Problems Son      labor Neg Hx     Eclampsia Neg Hx     Pancreatic cancer Neg Hx      "Kidney cancer Neg Hx     Celiac disease Neg Hx     Cirrhosis Neg Hx     Colon polyps Neg Hx     Crohn's disease Neg Hx     Esophageal cancer Neg Hx     Inflammatory bowel disease Neg Hx     Liver cancer Neg Hx     Liver disease Neg Hx     Rectal cancer Neg Hx     Stomach cancer Neg Hx     Ulcerative colitis Neg Hx      Medications    Current Outpatient Medications:     estradioL (VAGIFEM) 10 mcg Tab, INSERT 1 TABLET(10 MCG) VAGINALLY 2 TIMES A WEEK, Disp: 24 tablet, Rfl: 0    pantoprazole (PROTONIX) 20 MG tablet, Take 1 tablet (20 mg total) by mouth before breakfast., Disp: 90 tablet, Rfl: 3    triamcinolone acetonide 0.1% (KENALOG) 0.1 % cream, Apply topically 2 (two) times daily. (Patient not taking: Reported on 9/12/2022), Disp: 45 g, Rfl: 0  Allergies  Review of patient's allergies indicates:  No Known Allergies    Review of Systems       See above   Review of Systems  Review of Systems   Constitutional:  Negative for appetite change and unexpected weight change.   HENT:  Negative for mouth sores.    Eyes:  Negative for visual disturbance.   Respiratory:  Negative for cough and shortness of breath.    Cardiovascular:  Negative for chest pain.   Gastrointestinal:  Negative for abdominal pain and diarrhea.   Genitourinary:  Negative for frequency.   Musculoskeletal:  Negative for back pain.   Skin:  Negative for rash.   Neurological:  Negative for headaches.   Hematological:  Negative for adenopathy.   Psychiatric/Behavioral:  The patient is not nervous/anxious.    All other systems reviewed and are negative.    Objective:      Vitals:   Vitals:    10/18/22 1410   BP: (!) 104/58   Pulse: 75   Resp: 18   Temp: 98 °F (36.7 °C)   TempSrc: Oral   SpO2: 96%   Weight: 58 kg (127 lb 15.6 oz)   Height: 5' 4" (1.626 m)     BMI: Body mass index is 21.97 kg/m².   Body surface area is 1.62 meters squared.    Physical Exam  Constitutional:       General: She is not in acute distress.     Appearance: She is well-developed. She " is not diaphoretic.   HENT:      Head: Normocephalic and atraumatic.   Eyes:      General: No scleral icterus.     Conjunctiva/sclera: Conjunctivae normal.   Cardiovascular:      Rate and Rhythm: Normal rate and regular rhythm.      Pulses: Normal pulses.      Heart sounds: Normal heart sounds.   Pulmonary:      Effort: Pulmonary effort is normal. No respiratory distress.      Breath sounds: Normal breath sounds.   Chest:      Comments: Deferred, done by gyn 09/12/22  Abdominal:      General: There is no distension.   Musculoskeletal:         General: Normal range of motion.      Cervical back: Normal range of motion and neck supple.   Skin:     General: Skin is warm and dry.   Neurological:      Mental Status: She is alert and oriented to person, place, and time.   Psychiatric:         Behavior: Behavior normal.       Laboratory Data: reviewed most recent   Imaging: reviewed most recent    Assessment:     1. At high risk for breast cancer        1. Increased risk of breast cancer   * Tyrer-Cuzick (TC) lifetime risk of 9.8%. We discussed that TC score will categorize your lifetime risk of being diagnosed with breast cancer. Categories are as such: Average risk <15%, Intermediate risk 15-19%, and High risk > or = to 20%.    *The Mahsa Model for Breast Cancer risk: She has a 4.1% 5-year breast cancer risk (Compared with 1.7% for the average 59 y.o. woman) and 20.6% Lifetime breast cancer risk (Compared with 9.3% for the average 59 y.o. woman). A woman's risk is considered low if her five-year risk of developing breast cancer is less than 1.6%; it is considered high if she scores above 1.66%. (All women who are over 60 have a score of at least 1.66 and are considered high risk, based on the Mahsa Model.)    Recommendation for women with elevated TC score:     Recommendation for women with elevated Mahsa Model.         Risk factors are categorized into 2 groups: Modifiable and Non-modifiable. Modifiable risk factors  include use of hormones, alcohol, smoking, diet and exercise. Non-modifiable risk factors include breast density, genetics, chest radiation, previous pregnancies, age of first period, and age of menopause.      * For women at high risk for breast cancer, endocrine therapy can reduce the risk of invasive and/or in situ breast cancers. (tamoxifen for premenopausal or postmenopausal women and raloxifene or exemestane for postmenopausal women).     * Discussed that Tamoxifen 20 mg daily for 5 years has shown to reduce risk of breast cancer by 49% and women with ADH/ALH or LCIS have an even more significant reduction of risk of 86%. Aromatase inhibitors for 5 years have also shown risk reduction in terms of 50-60%. At current, there is not adequate data to recommend longer courses of therapy more than 5 years for risk reduction.      * Tamoxifen has limited data in BRCA 1/2 mutation carriers but limited retrospective data is suggestive of benefit. There is retrospective data that aromatase inhibitors can reduce the risk of contralateral ER positive breast cancers in BRCA 1/2 patients who were taking AIs as adjuvant therapy. There is no data for raloxifen in the population.      * Reviewed risks of Tamoxifen side effects include hot flashes, invasive endometrial cancer in women > 49 years of age (2.3/1000 compared to 0.9/1000), cataracts, increased risk of pulmonary embolism among others.     * Reviewed Lifestyle modifications which have shown benefit:  Limit alcohol consumption to less than 1 drink per day (1 ounce liquor, 6 oz wine, 8 oz beer)  Avoid smoking.  Exercise at least 150 minutes per week of moderate intensity aerobic activity or at least 75 minutes of vigorous activity. Exercise can lower the relative risk of breast cancer by ~18-20%.  Maintain healthy weight and avoid post-menopausal weight gain. Avoid processed foods and eat more lean proteins, fruits and vegetables.                  * Discussed available  resources including genetic counseling, nutrition, weight management.    * Reviewed future screening:   Semiannual (every 6 months) CBE (clinical breast exam).   Annual Breast MRI alternating with an annual MMG. if TC 20% or greater.                Discussed with patient that there are several models available for stratifying breast cancer risk, and Xander is presently the model utilized by [facility] and is a model recommended per current NCCN guidelines.      The Mahsa Model for Breast Cancer risk estimates the absolute 5 year risk and lifetime risk of developing breast cancer. Family history includes only first degree relatives with breast cancer, which is not enough information to estimate the risk of a patient having BRCA mutation. It also underestimates the cancer risk for patients with extensive family history. The Mahsa Model is a good predictor of risk for populations but not for individuals. It adjusts risk for race/ethnicity. It may underestimate breast cancer risk in patients with atypical hyperplasia and strong family history. The Mahsa Model was NOT designed to estimate risk for: Women with a prior diagnosis of breast cancer, lobular carcinoma in situ (LCIS), or ductal carcinoma in situ (DCIS);  Women who have received previous radiation therapy to the chest for treatment of Hodgkin lymphoma;  Women with gene mutations in BRCA1 or BRCA2, or those who are known to have certain genetic syndromes that increase risk for breast cancer; Women of age <35 or >85.          Plan:     Patient has opted out of chemoprevention but will call in the future if she wishes to pursue.   Patient elects to proceed with alternating annual mammogram and annual breast MRI along with semiannual CBEs.  Patient will follow up with PCP or GYN for one semiannual CBE along with annual mammogram in September 2023 and will RTC here for one semiannual CBE along with annual breast MRI in March 2023.  Lifestyle  modifications as detailed above.   5.   Encouraged breast awareness, including monthly breast self-exams.     RTC in 5 months to see me either at [facility] or on 3rd floor..     Questions were encouraged and answered to patient's satisfaction, and patient verbalized understanding of information and agreement with the plan. Advised patient to RTC with any interval changes or concerns.      Patient is in agreement with the proposed treatment plan. All questions were answered to the patient's satisfaction. Patient knows to call clinic for any new or worsening symptoms and if anything is needed before the next clinic visit.    JENI Patrick      Med Onc Chart Routing      Follow up with physician    Follow up with GERARD . March 2023 for follow up and clinical breast exam   Infusion scheduling note    Injection scheduling note    Labs    Imaging MRI   March 2023, same day as gerard follow up   Pharmacy appointment    Other referrals      JENI Patrick  Hematology & Medical Oncology   [facility] [place] [address]  ph. [phone]  Fax. [phone]    Total time spent with the patient: 45 minutes, 35 minutes of face to face consultation and 10 minutes of chart review and coordination of care, on the day of the visit. This includes face to face time and non-face to face time preparing to see the patient (eg, review of tests), obtaining and/or reviewing separately obtained history, documenting clinical information in the electronic or other health record, independently interpreting resultsand communicating results to the patient/family/caregiver, or care coordination.

## 2022-12-07 ENCOUNTER — PATIENT MESSAGE (OUTPATIENT)
Dept: INTERNAL MEDICINE | Facility: CLINIC | Age: 62
End: 2022-12-07
Payer: COMMERCIAL

## 2022-12-07 DIAGNOSIS — R30.0 DYSURIA: Primary | ICD-10-CM

## 2022-12-07 RX ORDER — NITROFURANTOIN 25; 75 MG/1; MG/1
100 CAPSULE ORAL 2 TIMES DAILY
Qty: 10 CAPSULE | Refills: 0 | Status: SHIPPED | OUTPATIENT
Start: 2022-12-07 | End: 2022-12-12

## 2022-12-07 NOTE — TELEPHONE ENCOUNTER
----- Message from Luz Gustafson sent at 12/7/2022  1:31 PM CST -----  Name of Who is Calling:ABDULAZIZ ACKERMAN [[id number]]              What is the request in detail:Requesting a call back to get a medication for a UTI. Patient is currently in [place]              Can the clinic reply by [facility]:no              What Number to Call Back if not in [facility]:[phone]

## 2022-12-07 NOTE — TELEPHONE ENCOUNTER
Recommend going to an urgent care near her to be appropriately assessed if symptoms do not improve in the next 24-48 hours.   Rx sent in

## 2022-12-21 ENCOUNTER — TELEPHONE (OUTPATIENT)
Dept: INTERNAL MEDICINE | Facility: CLINIC | Age: 62
End: 2022-12-21
Payer: COMMERCIAL

## 2022-12-21 NOTE — TELEPHONE ENCOUNTER
Recall letter sent for pt for annual appointment with Dr. Stoddard for around July 21, 2023 (letter for 2-1-23)

## 2022-12-21 NOTE — TELEPHONE ENCOUNTER
----- Message from Garima Mariee MA sent at 2022  9:36 AM CDT -----  Regardin year f/u annual  Call pt to schedule appointment for around 2023

## 2023-01-01 NOTE — TRANSFER OF CARE
"Anesthesia Transfer of Care Note    Patient: Xochitl Weiss    Procedure(s) Performed: Procedure(s) (LRB):  COLONOSCOPY (N/A)    Patient location: GI    Anesthesia Type: general    Transport from OR: Transported from OR on room air with adequate spontaneous ventilation    Post pain: adequate analgesia    Post assessment: no apparent anesthetic complications and tolerated procedure well    Post vital signs: stable    Level of consciousness: awake    Nausea/Vomiting: no nausea/vomiting    Complications: none    Transfer of care protocol was followed      Last vitals:   Visit Vitals  BP (!) 111/56   Pulse 77   Temp 36.6 °C (97.9 °F)   Resp 17   Ht 5' 4" (1.626 m)   Wt 61.7 kg (136 lb)   SpO2 98%   Breastfeeding? No   BMI 23.34 kg/m²     "
Offered, attempted but was not successful

## 2023-01-11 ENCOUNTER — PATIENT MESSAGE (OUTPATIENT)
Dept: INTERNAL MEDICINE | Facility: CLINIC | Age: 63
End: 2023-01-11
Payer: COMMERCIAL

## 2023-01-11 NOTE — TELEPHONE ENCOUNTER
LOV 7-21-22  A1c done on July 29, 2022, future A1c already ordered as part of annual visit (July 21, 2023)    Annual scheduled for July 25 at 8:30 AM

## 2023-03-21 ENCOUNTER — TELEPHONE (OUTPATIENT)
Dept: INTERNAL MEDICINE | Facility: CLINIC | Age: 63
End: 2023-03-21
Payer: COMMERCIAL

## 2023-03-21 NOTE — TELEPHONE ENCOUNTER
Staff left patient a voicemail regarding rescheduling appointment on 07/25/23 with MD Linda. Staff informed patient that her appointment will be canceled and to contact office regarding rescheduling.

## 2023-04-03 ENCOUNTER — TELEPHONE (OUTPATIENT)
Dept: HEMATOLOGY/ONCOLOGY | Facility: CLINIC | Age: 63
End: 2023-04-03
Payer: COMMERCIAL

## 2023-04-03 NOTE — TELEPHONE ENCOUNTER
Attempted to reach patient to schedule recall, no answer, left voicemail for patient to call back to reschedule

## 2023-04-03 NOTE — TELEPHONE ENCOUNTER
----- Message from Willa Ortiz sent at 4/3/2023  1:36 PM CDT -----  Regarding: Return call  Contact: [phone]  Patient Xochitl is returning call. Please call patient at [phone]    Thank You

## 2023-04-04 ENCOUNTER — TELEPHONE (OUTPATIENT)
Dept: INTERNAL MEDICINE | Facility: CLINIC | Age: 63
End: 2023-04-04
Payer: COMMERCIAL

## 2023-04-04 NOTE — TELEPHONE ENCOUNTER
----- Message from Alfreda Shelby sent at 4/4/2023  3:10 PM CDT -----  Regarding: nurse c/b  Name of Who is Calling:ABDULAZIZ ACKERMAN [[id number]]          What is the request in detail: Pt is asking if nurse would c/b. It is regarding an upper respiratory infection and low grade fever. Please c/b to assist asap          Can the clinic reply by [facility]:          [facility]:[phone]

## 2023-04-04 NOTE — TELEPHONE ENCOUNTER
Returned pt's call.  Pt states she has been having URI for 10 days. All of her family had it - and then she got it delayed from them. Spoke with [facility] and rec'd cefdinir, prednisone, Trelegy, and Guaifenesin. Pt somewhat better for a bit, but still running low grade temp at times, congestion, sore area in ribs on R side with dry cough and slight SOB - feels like tightness when breathing. Pt unsure if this is from rib pain.    Made appt for pt to be seen tomorrow.    Notified patient of emergency prompts.     Pt verbalized understanding and had no further questions/all questions answered.

## 2023-04-05 ENCOUNTER — OFFICE VISIT (OUTPATIENT)
Dept: INTERNAL MEDICINE | Facility: CLINIC | Age: 63
End: 2023-04-05
Payer: COMMERCIAL

## 2023-04-05 VITALS
HEART RATE: 77 BPM | DIASTOLIC BLOOD PRESSURE: 70 MMHG | WEIGHT: 123.69 LBS | BODY MASS INDEX: 21.11 KG/M2 | OXYGEN SATURATION: 98 % | HEIGHT: 64 IN | SYSTOLIC BLOOD PRESSURE: 104 MMHG

## 2023-04-05 DIAGNOSIS — R05.1 ACUTE COUGH: ICD-10-CM

## 2023-04-05 DIAGNOSIS — J39.8 CONGESTION OF UPPER RESPIRATORY TRACT: Primary | ICD-10-CM

## 2023-04-05 PROCEDURE — 3074F PR MOST RECENT SYSTOLIC BLOOD PRESSURE < 130 MM HG: ICD-10-PCS | Mod: CPTII,S$GLB,, | Performed by: STUDENT IN AN ORGANIZED HEALTH CARE EDUCATION/TRAINING PROGRAM

## 2023-04-05 PROCEDURE — 99999 PR PBB SHADOW E&M-EST. PATIENT-LVL III: ICD-10-PCS | Mod: PBBFAC,,, | Performed by: STUDENT IN AN ORGANIZED HEALTH CARE EDUCATION/TRAINING PROGRAM

## 2023-04-05 PROCEDURE — 3078F PR MOST RECENT DIASTOLIC BLOOD PRESSURE < 80 MM HG: ICD-10-PCS | Mod: CPTII,S$GLB,, | Performed by: STUDENT IN AN ORGANIZED HEALTH CARE EDUCATION/TRAINING PROGRAM

## 2023-04-05 PROCEDURE — 99214 PR OFFICE/OUTPT VISIT, EST, LEVL IV, 30-39 MIN: ICD-10-PCS | Mod: S$GLB,,, | Performed by: STUDENT IN AN ORGANIZED HEALTH CARE EDUCATION/TRAINING PROGRAM

## 2023-04-05 PROCEDURE — 99214 OFFICE O/P EST MOD 30 MIN: CPT | Mod: S$GLB,,, | Performed by: STUDENT IN AN ORGANIZED HEALTH CARE EDUCATION/TRAINING PROGRAM

## 2023-04-05 PROCEDURE — 3008F PR BODY MASS INDEX (BMI) DOCUMENTED: ICD-10-PCS | Mod: CPTII,S$GLB,, | Performed by: STUDENT IN AN ORGANIZED HEALTH CARE EDUCATION/TRAINING PROGRAM

## 2023-04-05 PROCEDURE — 99999 PR PBB SHADOW E&M-EST. PATIENT-LVL III: CPT | Mod: PBBFAC,,, | Performed by: STUDENT IN AN ORGANIZED HEALTH CARE EDUCATION/TRAINING PROGRAM

## 2023-04-05 PROCEDURE — 3008F BODY MASS INDEX DOCD: CPT | Mod: CPTII,S$GLB,, | Performed by: STUDENT IN AN ORGANIZED HEALTH CARE EDUCATION/TRAINING PROGRAM

## 2023-04-05 PROCEDURE — 3074F SYST BP LT 130 MM HG: CPT | Mod: CPTII,S$GLB,, | Performed by: STUDENT IN AN ORGANIZED HEALTH CARE EDUCATION/TRAINING PROGRAM

## 2023-04-05 PROCEDURE — 1159F MED LIST DOCD IN RCRD: CPT | Mod: CPTII,S$GLB,, | Performed by: STUDENT IN AN ORGANIZED HEALTH CARE EDUCATION/TRAINING PROGRAM

## 2023-04-05 PROCEDURE — 1159F PR MEDICATION LIST DOCUMENTED IN MEDICAL RECORD: ICD-10-PCS | Mod: CPTII,S$GLB,, | Performed by: STUDENT IN AN ORGANIZED HEALTH CARE EDUCATION/TRAINING PROGRAM

## 2023-04-05 PROCEDURE — 3078F DIAST BP <80 MM HG: CPT | Mod: CPTII,S$GLB,, | Performed by: STUDENT IN AN ORGANIZED HEALTH CARE EDUCATION/TRAINING PROGRAM

## 2023-04-05 RX ORDER — AZELASTINE 1 MG/ML
2 SPRAY, METERED NASAL 2 TIMES DAILY
Qty: 30 ML | Refills: 1 | Status: SHIPPED | OUTPATIENT
Start: 2023-04-05 | End: 2023-08-01

## 2023-04-05 RX ORDER — BENZONATATE 200 MG/1
200 CAPSULE ORAL 3 TIMES DAILY PRN
Qty: 40 CAPSULE | Refills: 0 | Status: SHIPPED | OUTPATIENT
Start: 2023-04-05 | End: 2023-08-01

## 2023-04-05 NOTE — PATIENT INSTRUCTIONS
For chest congestion try Mucinex DM 1,200 mg twice daily (must be well hydrated for the medication to work).   Sore throat can also be treated with warm tea with honey and salt water gargling (8 oz warm water with 1/4 tsp salt).   Try nasal saline rinses using only sterile or distilled water 1-2 times daily.   Ensure adequate hydration.   Sent azelastine for sinus congestion to pharmacy.    Contact clinic in 3-5 days if symptoms worsening or unimproved.

## 2023-04-05 NOTE — PROGRESS NOTES
"Subjective:       Patient ID: Xochitl Weiss is a 62 y.o. female.    Chief Complaint: Congestion of upper respiratory tract [J39.8]    Patient is new to me, PCP is Dr. Stoddard. Here today for the following:    Endorses rhinorrhea, sinus congestion, sinus pressure, cough, and chills starting 21MAR2023.  Denies SOB, wheezing, CP, vomiting, and diarrhea.   Cough productive of mucus.  Has tried Dayquil, Nyquil without significant improvement.  Has tried Mucinex D with some improvement.  Tolerating PO intake without difficulty.   Seen at ENT office on 24MAR.  Was given steroid shot, 7 day course cefdinir, prednisone, Trelegy, and Guaifenesin at ENT, with mild improvement improvement.  Endorses family was sick with similar illness recently.     Review of Systems   Constitutional:  Positive for chills. Negative for fever.   HENT:  Positive for congestion, postnasal drip, rhinorrhea, sinus pressure and sore throat.    Respiratory:  Positive for cough. Negative for shortness of breath.    Cardiovascular:  Negative for chest pain and palpitations.   Gastrointestinal:  Negative for abdominal pain, diarrhea, nausea and vomiting.       Current Outpatient Medications   Medication Instructions    estradioL (VAGIFEM) 10 mcg Tab INSERT 1 TABLET(10 MCG) VAGINALLY 2 TIMES A WEEK    pantoprazole (PROTONIX) 20 mg, Oral, Before breakfast    triamcinolone acetonide 0.1% (KENALOG) 0.1 % cream Topical (Top), 2 times daily     Objective:      Vitals:    04/05/23 1021   BP: 104/70   Pulse: 77   SpO2: 98%   Weight: 56.1 kg (123 lb 10.9 oz)   Height: 5' 4" (1.626 m)   PainSc: 0-No pain     Body mass index is 21.23 kg/m².    Physical Exam  Vitals reviewed.   Constitutional:       General: She is not in acute distress.     Appearance: Normal appearance. She is not ill-appearing or diaphoretic.   HENT:      Head: Normocephalic and atraumatic.      Right Ear: Tympanic membrane, ear canal and external ear normal. There is no impacted cerumen.     "  Left Ear: Tympanic membrane, ear canal and external ear normal. There is no impacted cerumen.      Nose: Congestion and rhinorrhea present. Rhinorrhea is clear.      Right Nostril: No foreign body, epistaxis, septal hematoma or occlusion.      Left Nostril: No foreign body, epistaxis, septal hematoma or occlusion.      Right Turbinates: Swollen. Not pale.      Left Turbinates: Swollen. Not pale.      Mouth/Throat:      Mouth: Mucous membranes are moist.      Pharynx: Oropharynx is clear. Posterior oropharyngeal erythema (moderate, posterior oropharynx) present. No pharyngeal swelling, oropharyngeal exudate or uvula swelling.   Eyes:      General: No scleral icterus.        Right eye: No discharge.         Left eye: No discharge.      Conjunctiva/sclera: Conjunctivae normal.   Neck:      Thyroid: No thyromegaly or thyroid tenderness.      Trachea: Trachea normal.   Cardiovascular:      Rate and Rhythm: Normal rate and regular rhythm.      Heart sounds: Normal heart sounds. No murmur heard.    No friction rub. No gallop.   Pulmonary:      Effort: Pulmonary effort is normal. No respiratory distress.      Breath sounds: Normal breath sounds. No stridor. No wheezing, rhonchi or rales.   Musculoskeletal:      Cervical back: Neck supple.   Lymphadenopathy:      Head:      Right side of head: No submandibular or posterior auricular adenopathy.      Left side of head: No submandibular or posterior auricular adenopathy.      Cervical: No cervical adenopathy.      Right cervical: No superficial, deep or posterior cervical adenopathy.     Left cervical: No superficial, deep or posterior cervical adenopathy.      Upper Body:      Right upper body: No supraclavicular adenopathy.      Left upper body: No supraclavicular adenopathy.   Skin:     General: Skin is warm and dry.   Neurological:      Mental Status: She is alert. Mental status is at baseline.   Psychiatric:         Mood and Affect: Mood normal.         Behavior:  Behavior normal.       Assessment:       1. Congestion of upper respiratory tract    2. Acute cough        Plan:       Congestion of upper respiratory tract  Acute cough  For chest congestion try Mucinex DM 1,200 mg twice daily (must be well hydrated for the medication to work).   Sore throat can also be treated with warm tea with honey and salt water gargling (8 oz warm water with 1/4 tsp salt).   Try nasal saline rinses using only sterile or distilled water 1-2 times daily.   Ensure adequate hydration.   Sent azelastine for sinus congestion to pharmacy.    Contact clinic in 3-5 days if symptoms worsening or unimproved.  If still unimproved consider repeat course of abx.  -     benzonatate (TESSALON) 200 MG capsule; Take 1 capsule (200 mg total) by mouth 3 (three) times daily as needed for Cough.  -     azelastine (ASTELIN) 137 mcg (0.1 %) nasal spray; 2 sprays (274 mcg total) by Nasal route 2 (two) times daily.      Page Harrell MD  4/5/2023

## 2023-04-17 ENCOUNTER — PATIENT MESSAGE (OUTPATIENT)
Dept: INTERNAL MEDICINE | Facility: CLINIC | Age: 63
End: 2023-04-17
Payer: COMMERCIAL

## 2023-04-17 ENCOUNTER — HOSPITAL ENCOUNTER (OUTPATIENT)
Dept: RADIOLOGY | Facility: OTHER | Age: 63
Discharge: HOME | End: 2023-04-17
Attending: NURSE PRACTITIONER
Payer: COMMERCIAL

## 2023-04-17 DIAGNOSIS — Z80.3 FAMILY HISTORY OF BREAST CANCER IN FIRST DEGREE RELATIVE: ICD-10-CM

## 2023-04-17 DIAGNOSIS — Z91.89 AT HIGH RISK FOR BREAST CANCER: ICD-10-CM

## 2023-04-17 DIAGNOSIS — R30.0 DYSURIA: Primary | ICD-10-CM

## 2023-04-17 DIAGNOSIS — R92.30 DENSE BREAST TISSUE ON MAMMOGRAM: ICD-10-CM

## 2023-04-17 PROCEDURE — 25500020 PHARM REV CODE 255: Performed by: NURSE PRACTITIONER

## 2023-04-17 PROCEDURE — 77049 MRI BREAST C-+ W/CAD BI: CPT | Mod: 26,,, | Performed by: RADIOLOGY

## 2023-04-17 PROCEDURE — 77049 MRI BREAST C-+ W/CAD BI: CPT | Mod: TC

## 2023-04-17 PROCEDURE — A9577 INJ MULTIHANCE: HCPCS | Performed by: NURSE PRACTITIONER

## 2023-04-17 PROCEDURE — 77049 MRI BREAST W/WO CONTRAST, W/CAD, BILATERAL: ICD-10-PCS | Mod: 26,,, | Performed by: RADIOLOGY

## 2023-04-17 RX ADMIN — GADOBENATE DIMEGLUMINE 11 ML: 529 INJECTION, SOLUTION INTRAVENOUS at 12:04

## 2023-04-18 ENCOUNTER — PATIENT MESSAGE (OUTPATIENT)
Dept: INTERNAL MEDICINE | Facility: CLINIC | Age: 63
End: 2023-04-18
Payer: COMMERCIAL

## 2023-04-18 ENCOUNTER — LAB VISIT (OUTPATIENT)
Dept: LAB | Facility: HOSPITAL | Age: 63
End: 2023-04-18
Attending: INTERNAL MEDICINE
Payer: COMMERCIAL

## 2023-04-18 DIAGNOSIS — R30.0 DYSURIA: ICD-10-CM

## 2023-04-18 LAB
BACTERIA #/AREA URNS AUTO: ABNORMAL /HPF
BILIRUB UR QL STRIP: NEGATIVE
CLARITY UR REFRACT.AUTO: CLEAR
COLOR UR AUTO: ABNORMAL
GLUCOSE UR QL STRIP: NEGATIVE
HGB UR QL STRIP: ABNORMAL
KETONES UR QL STRIP: NEGATIVE
LEUKOCYTE ESTERASE UR QL STRIP: ABNORMAL
MICROSCOPIC COMMENT: ABNORMAL
NITRITE UR QL STRIP: NEGATIVE
PH UR STRIP: 6 [PH] (ref 5–8)
PROT UR QL STRIP: NEGATIVE
RBC #/AREA URNS AUTO: 1 /HPF (ref 0–4)
SP GR UR STRIP: 1 (ref 1–1.03)
SQUAMOUS #/AREA URNS AUTO: 0 /HPF
URN SPEC COLLECT METH UR: ABNORMAL
WBC #/AREA URNS AUTO: 16 /HPF (ref 0–5)
YEAST UR QL AUTO: ABNORMAL

## 2023-04-18 PROCEDURE — 81001 URINALYSIS AUTO W/SCOPE: CPT | Performed by: INTERNAL MEDICINE

## 2023-04-18 PROCEDURE — 87077 CULTURE AEROBIC IDENTIFY: CPT | Performed by: INTERNAL MEDICINE

## 2023-04-18 PROCEDURE — 87088 URINE BACTERIA CULTURE: CPT | Performed by: INTERNAL MEDICINE

## 2023-04-18 PROCEDURE — 87186 SC STD MICRODIL/AGAR DIL: CPT | Performed by: INTERNAL MEDICINE

## 2023-04-18 PROCEDURE — 87086 URINE CULTURE/COLONY COUNT: CPT | Performed by: INTERNAL MEDICINE

## 2023-04-18 RX ORDER — SULFAMETHOXAZOLE AND TRIMETHOPRIM 800; 160 MG/1; MG/1
1 TABLET ORAL 2 TIMES DAILY
Qty: 6 TABLET | Refills: 0 | Status: SHIPPED | OUTPATIENT
Start: 2023-04-18 | End: 2023-04-21

## 2023-04-18 NOTE — TELEPHONE ENCOUNTER
----- Message from Manju Ledbetter sent at 4/18/2023 12:43 PM CDT -----      Name of Who is Calling: ABDULAZIZ ACKERMAN [[id number]]      What is the request in detail: Pt called to check status of medication for bladder infection.Please contact to further discuss and advise.          Can the clinic reply by [facility]: Y      What Number to Call Back [facility] not [facility]: [phone]                                         Tremfya Counseling: I discussed with the patient the risks of guselkumab including but not limited to immunosuppression, serious infections, worsening of inflammatory bowel disease and drug reactions.  The patient understands that monitoring is required including a PPD at baseline and must alert us or the primary physician if symptoms of infection or other concerning signs are noted.

## 2023-04-18 NOTE — TELEPHONE ENCOUNTER
LM for the patient to call the clinic. My message was that I was going to try to schedule an appointment at [place] lab as requested. Unable to schedule at that lab in [facility]. Sent portal message to the patient.

## 2023-04-19 ENCOUNTER — OFFICE VISIT (OUTPATIENT)
Dept: HEMATOLOGY/ONCOLOGY | Facility: CLINIC | Age: 63
End: 2023-04-19
Payer: COMMERCIAL

## 2023-04-19 VITALS
WEIGHT: 126.75 LBS | TEMPERATURE: 98 F | SYSTOLIC BLOOD PRESSURE: 117 MMHG | BODY MASS INDEX: 21.64 KG/M2 | HEIGHT: 64 IN | RESPIRATION RATE: 18 BRPM | HEART RATE: 79 BPM | DIASTOLIC BLOOD PRESSURE: 73 MMHG | OXYGEN SATURATION: 97 %

## 2023-04-19 DIAGNOSIS — Z12.31 ENCOUNTER FOR SCREENING MAMMOGRAM FOR MALIGNANT NEOPLASM OF BREAST: ICD-10-CM

## 2023-04-19 DIAGNOSIS — Z80.3 FAMILY HISTORY OF BREAST CANCER IN FIRST DEGREE RELATIVE: ICD-10-CM

## 2023-04-19 DIAGNOSIS — Z91.89 AT HIGH RISK FOR BREAST CANCER: Primary | ICD-10-CM

## 2023-04-19 DIAGNOSIS — R92.30 DENSE BREAST TISSUE ON MAMMOGRAM: ICD-10-CM

## 2023-04-19 PROCEDURE — 1159F PR MEDICATION LIST DOCUMENTED IN MEDICAL RECORD: ICD-10-PCS | Mod: CPTII,S$GLB,, | Performed by: NURSE PRACTITIONER

## 2023-04-19 PROCEDURE — 99999 PR PBB SHADOW E&M-EST. PATIENT-LVL IV: CPT | Mod: PBBFAC,,, | Performed by: NURSE PRACTITIONER

## 2023-04-19 PROCEDURE — 99999 PR PBB SHADOW E&M-EST. PATIENT-LVL IV: ICD-10-PCS | Mod: PBBFAC,,, | Performed by: NURSE PRACTITIONER

## 2023-04-19 PROCEDURE — 3008F PR BODY MASS INDEX (BMI) DOCUMENTED: ICD-10-PCS | Mod: CPTII,S$GLB,, | Performed by: NURSE PRACTITIONER

## 2023-04-19 PROCEDURE — 3074F SYST BP LT 130 MM HG: CPT | Mod: CPTII,S$GLB,, | Performed by: NURSE PRACTITIONER

## 2023-04-19 PROCEDURE — 3074F PR MOST RECENT SYSTOLIC BLOOD PRESSURE < 130 MM HG: ICD-10-PCS | Mod: CPTII,S$GLB,, | Performed by: NURSE PRACTITIONER

## 2023-04-19 PROCEDURE — 3078F DIAST BP <80 MM HG: CPT | Mod: CPTII,S$GLB,, | Performed by: NURSE PRACTITIONER

## 2023-04-19 PROCEDURE — 3008F BODY MASS INDEX DOCD: CPT | Mod: CPTII,S$GLB,, | Performed by: NURSE PRACTITIONER

## 2023-04-19 PROCEDURE — 99213 PR OFFICE/OUTPT VISIT, EST, LEVL III, 20-29 MIN: ICD-10-PCS | Mod: S$GLB,,, | Performed by: NURSE PRACTITIONER

## 2023-04-19 PROCEDURE — 3078F PR MOST RECENT DIASTOLIC BLOOD PRESSURE < 80 MM HG: ICD-10-PCS | Mod: CPTII,S$GLB,, | Performed by: NURSE PRACTITIONER

## 2023-04-19 PROCEDURE — 1159F MED LIST DOCD IN RCRD: CPT | Mod: CPTII,S$GLB,, | Performed by: NURSE PRACTITIONER

## 2023-04-19 PROCEDURE — 99213 OFFICE O/P EST LOW 20 MIN: CPT | Mod: S$GLB,,, | Performed by: NURSE PRACTITIONER

## 2023-04-19 NOTE — PROGRESS NOTES
"  Reason For Consultation:   Increased lifetime risk of breast cancer    Referring Provider:   No referring provider defined for this encounter.    Records Obtained: Records of the patients history including those obtained from the referring provider were reviewed and summarized in detail.    HPI:   Xochitl Weiss presents for a follow up for increased risk of breast cancer. She is postmenopausal. She presented for screening mammogram on 22 which was benign but revealed a Tyrer-Cuzick score of 14.87%.  She has been seen in the high risk breast clinic in the past, last in .  Has dense breast tissue and her mother had breast cancer.  MRI done 23 and results are still pending    Today, Feels good and no complaints.   No breast concerns.  Had breast implants removed in 2021    High Risk Breast cancer specific history:  - Age: 62 y.o.   - Height:  5'4"  - Weight: 125 lbs  - Breast density per BI-RADS:  Heterogeneously dense  - Age at menarche:  15  - Number of pregnancies: ; age of first live birth: 29   - History of breast feeding: Yes - about 8 weeks x2.   - Age at menopause, if applicable:  34, total hysterectomy  -Uterus and ovaries intact: No: hysterectomy at 34  - HRT: Yes - currently using estradiol inserts, took oral hormone replacement therapy in the past, stopped oral hrt more than 5 years ago    - Genetic testing: Yes - brca 1/2 testing back in , negative, also negative for granger mutation  - Personal history of cancer: No  - Previous chest radiation exposure between ages 10-30 years old: No  - Personal history of breast biopsy: Yes - 2, one was a lipoma, both benign, no atypia  - Ashken[employer] Inheritance: No  - Family history of cancer:  Mother: breast cancer, diagnosed at 68,  at 81, also had colon cancer, and uterine cancer which was diagnosed under 40  Maternal aunt: ovarian cancer  Maternal uncle: lung cancer  Father: colon cancer    Social History:  Tobacco " use:  none  Alcohol use:  socially, about 2 per week  Exercise regimen: three times per week, plays gold twice weekly and rides stationary bike  Employment: retired    SEE CALCULATED RISK BELOW.     Past Medical   Past Medical History:   Diagnosis Date    BRCA negative     Colon polyp     Diverticula of colon     Diverticulitis     Family history of breast cancer     mother    Family history of colon cancer     2 family members over age 60    General anesthetics causing adverse effect in therapeutic use     per patient report slow to awaken    Genetic testing     negative Integrated BRACAnalysis    GERD (gastroesophageal reflux disease)     Kidney stones     Mild hyperlipidemia      Patient Active Problem List   Diagnosis    Encounter for long-term (current) use of other medications    Family history of colon cancer    GERD (gastroesophageal reflux disease)    Hydronephrosis    Family history of breast cancer    Premature menopause on HRT    Hemorrhoids, internal    History of diverticulitis    Food impaction of esophagus    Esophageal foreign body    Recurrent UTI    Dysphagia    At high risk for breast cancer    Family history of colon cancer in father    Reflux esophagitis     Social History   Social History     Tobacco Use    Smoking status: Never    Smokeless tobacco: Never   Substance Use Topics    Alcohol use: Yes     Alcohol/week: 4.0 standard drinks     Types: 4 Glasses of wine per week     Comment: socially    Drug use: No     Family History  Family History   Problem Relation Age of Onset    Colon cancer Father 68    Cancer Father         Colon    Breast cancer Mother 68    Colon cancer Mother 70        twice    Uterine cancer Mother 30    Diabetes Mother     Hypertension Mother     Cancer Mother         Uterine, Breast & Colon    Ovarian cancer Maternal Aunt 60    Lung cancer Maternal Uncle 60    No Known Problems Daughter     No Known Problems Son      labor Neg Hx     Eclampsia Neg Hx      Pancreatic cancer Neg Hx     Kidney cancer Neg Hx     Celiac disease Neg Hx     Cirrhosis Neg Hx     Colon polyps Neg Hx     Crohn's disease Neg Hx     Esophageal cancer Neg Hx     Inflammatory bowel disease Neg Hx     Liver cancer Neg Hx     Liver disease Neg Hx     Rectal cancer Neg Hx     Stomach cancer Neg Hx     Ulcerative colitis Neg Hx      Medications    Current Outpatient Medications:     azelastine (ASTELIN) 137 mcg (0.1 %) nasal spray, 2 sprays (274 mcg total) by Nasal route 2 (two) times daily., Disp: 30 mL, Rfl: 1    benzonatate (TESSALON) 200 MG capsule, Take 1 capsule (200 mg total) by mouth 3 (three) times daily as needed for Cough., Disp: 40 capsule, Rfl: 0    estradioL (VAGIFEM) 10 mcg Tab, INSERT 1 TABLET(10 MCG) VAGINALLY 2 TIMES A WEEK, Disp: 24 tablet, Rfl: 0    sulfamethoxazole-trimethoprim 800-160mg (BACTRIM DS) 800-160 mg Tab, Take 1 tablet by mouth 2 (two) times daily. for 3 days, Disp: 6 tablet, Rfl: 0    pantoprazole (PROTONIX) 20 MG tablet, Take 1 tablet (20 mg total) by mouth before breakfast., Disp: 90 tablet, Rfl: 3    triamcinolone acetonide 0.1% (KENALOG) 0.1 % cream, Apply topically 2 (two) times daily. (Patient not taking: Reported on 9/12/2022), Disp: 45 g, Rfl: 0  Allergies  Review of patient's allergies indicates:  No Known Allergies    Review of Systems       See above   Review of Systems  Review of Systems   Constitutional:  Negative for appetite change and unexpected weight change.   HENT:  Negative for mouth sores.    Eyes:  Negative for visual disturbance.   Respiratory:  Negative for cough and shortness of breath.    Cardiovascular:  Negative for chest pain.   Gastrointestinal:  Negative for abdominal pain and diarrhea.   Genitourinary:  Negative for frequency.   Musculoskeletal:  Negative for back pain.   Skin:  Negative for rash.   Neurological:  Negative for headaches.   Hematological:  Negative for adenopathy.   Psychiatric/Behavioral:  The patient is not  "nervous/anxious.    All other systems reviewed and are negative.    Objective:      Vitals:   Vitals:    04/19/23 0858   BP: 117/73   Pulse: 79   Resp: 18   Temp: 98 °F (36.7 °C)   TempSrc: Oral   SpO2: 97%   Weight: 57.5 kg (126 lb 12.2 oz)   Height: 5' 4" (1.626 m)       BMI: Body mass index is 21.76 kg/m².   Body surface area is 1.61 meters squared.    Physical Exam  Constitutional:       General: She is not in acute distress.     Appearance: She is well-developed. She is not diaphoretic.   HENT:      Head: Normocephalic and atraumatic.   Eyes:      General: No scleral icterus.     Conjunctiva/sclera: Conjunctivae normal.   Cardiovascular:      Rate and Rhythm: Normal rate and regular rhythm.      Pulses: Normal pulses.      Heart sounds: Normal heart sounds.   Pulmonary:      Effort: Pulmonary effort is normal. No respiratory distress.      Breath sounds: Normal breath sounds.   Chest:      Comments: No skin changes or breast masses noted bilaterally, no  lymphadenopathy noted    Abdominal:      General: There is no distension.   Musculoskeletal:         General: Normal range of motion.      Cervical back: Normal range of motion and neck supple.   Skin:     General: Skin is warm and dry.   Neurological:      Mental Status: She is alert and oriented to person, place, and time.   Psychiatric:         Behavior: Behavior normal.       Laboratory Data: reviewed most recent   Imaging: reviewed most recent    Assessment:     1. At high risk for breast cancer    2. Family history of breast cancer in first degree relative    3. Dense breast tissue on mammogram    4. Encounter for screening mammogram for malignant neoplasm of breast          1. Increased risk of breast cancer   * Tyrer-Cuzick (TC) lifetime risk of 9.3%. (recalculated today) We discussed that TC score will categorize your lifetime risk of being diagnosed with breast cancer. Categories are as such: Average risk <15%, Intermediate risk 15-19%, and High risk " > or = to 20%.    *The Mahsa Model for Breast Cancer risk: She has a 4.28% 5-year breast cancer risk (Compared with 1.7% for the average 60 y.o. woman) and 20.6% Lifetime breast cancer risk (Compared with 9.3% for the average 60 y.o. woman). A woman's risk is considered low if her five-year risk of developing breast cancer is less than 1.6%; it is considered high if she scores above 1.66%. (All women who are over 60 have a score of at least 1.66 and are considered high risk, based on the Mahsa Model.)    Recommendation for women with elevated TC score:     Recommendation for women with elevated Mahsa Model.         Risk factors are categorized into 2 groups: Modifiable and Non-modifiable. Modifiable risk factors include use of hormones, alcohol, smoking, diet and exercise. Non-modifiable risk factors include breast density, genetics, chest radiation, previous pregnancies, age of first period, and age of menopause.      * For women at high risk for breast cancer, endocrine therapy can reduce the risk of invasive and/or in situ breast cancers. (tamoxifen for premenopausal or postmenopausal women and raloxifene or exemestane for postmenopausal women).     * Discussed that Tamoxifen 20 mg daily for 5 years has shown to reduce risk of breast cancer by 49% and women with ADH/ALH or LCIS have an even more significant reduction of risk of 86%. Aromatase inhibitors for 5 years have also shown risk reduction in terms of 50-60%. At current, there is not adequate data to recommend longer courses of therapy more than 5 years for risk reduction.      * Tamoxifen has limited data in BRCA 1/2 mutation carriers but limited retrospective data is suggestive of benefit. There is retrospective data that aromatase inhibitors can reduce the risk of contralateral ER positive breast cancers in BRCA 1/2 patients who were taking AIs as adjuvant therapy. There is no data for raloxifen in the population.      * Reviewed risks of Tamoxifen side  effects include hot flashes, invasive endometrial cancer in women > 49 years of age (2.3/1000 compared to 0.9/1000), cataracts, increased risk of pulmonary embolism among others.     * Reviewed Lifestyle modifications which have shown benefit:  Limit alcohol consumption to less than 1 drink per day (1 ounce liquor, 6 oz wine, 8 oz beer)  Avoid smoking.  Exercise at least 150 minutes per week of moderate intensity aerobic activity or at least 75 minutes of vigorous activity. Exercise can lower the relative risk of breast cancer by ~18-20%.  Maintain healthy weight and avoid post-menopausal weight gain. Avoid processed foods and eat more lean proteins, fruits and vegetables.                  * Discussed available resources including genetic counseling, nutrition, weight management.    * Reviewed future screening:   Semiannual (every 6 months) CBE (clinical breast exam).   Annual Breast MRI alternating with an annual MMG. if TC 20% or greater.                Discussed with patient that there are several models available for stratifying breast cancer risk, and Monica-Madick is presently the model utilized by [facility] and is a model recommended per current NCCN guidelines.      The Mahsa Model for Breast Cancer risk estimates the absolute 5 year risk and lifetime risk of developing breast cancer. Family history includes only first degree relatives with breast cancer, which is not enough information to estimate the risk of a patient having BRCA mutation. It also underestimates the cancer risk for patients with extensive family history. The Mahsa Model is a good predictor of risk for populations but not for individuals. It adjusts risk for race/ethnicity. It may underestimate breast cancer risk in patients with atypical hyperplasia and strong family history. The Mahsa Model was NOT designed to estimate risk for: Women with a prior diagnosis of breast cancer, lobular carcinoma in situ (LCIS), or ductal carcinoma  in situ (DCIS);  Women who have received previous radiation therapy to the chest for treatment of Hodgkin lymphoma;  Women with gene mutations in BRCA1 or BRCA2, or those who are known to have certain genetic syndromes that increase risk for breast cancer; Women of age <35 or >85.          Plan:     Patient has opted out of chemoprevention but will call in the future if she wishes to pursue.   Patient elects to proceed with alternating annual mammogram and annual breast MRI along with semiannual CBEs.  Even with TC score being 9.3, given high lyndsay score, family history and dense breast tissue she would like to proceed with yearly MRIs  Patient will follow up with PCP or GYN for one semiannual CBE along with annual mammogram in September 2023 and will RTC here for one semiannual CBE along with annual breast MRI in March 2024.  Lifestyle modifications as detailed above.   5.   Encouraged breast awareness, including monthly breast self-exams.     RTC in one year to see me either at [facility] or on 3rd floor..     Questions were encouraged and answered to patient's satisfaction, and patient verbalized understanding of information and agreement with the plan. Advised patient to RTC with any interval changes or concerns.      Patient is in agreement with the proposed treatment plan. All questions were answered to the patient's satisfaction. Patient knows to call clinic for any new or worsening symptoms and if anything is needed before the next clinic visit.    JENI Patrick      Med Onc Chart Routing      Follow up with physician    Follow up with GERARD 1 year.   Infusion scheduling note    Injection scheduling note    Labs    Imaging Mammogram   September 2023   Pharmacy appointment    Other referrals           JENI Patrick  Hematology & Medical Oncology   [facility] [place] [address]  ph. [phone]  Fax. [phone]    Total time spent with the patient: 30 minutes, 20 minutes of face to  face consultation and 10 minutes of chart review and coordination of care, on the day of the visit. This includes face to face time and non-face to face time preparing to see the patient (eg, review of tests), obtaining and/or reviewing separately obtained history, documenting clinical information in the electronic or other health record, independently interpreting resultsand communicating results to the patient/family/caregiver, or care coordination.

## 2023-04-20 LAB — BACTERIA UR CULT: ABNORMAL

## 2023-07-18 ENCOUNTER — LAB VISIT (OUTPATIENT)
Dept: LAB | Facility: HOSPITAL | Age: 63
End: 2023-07-18
Attending: INTERNAL MEDICINE
Payer: COMMERCIAL

## 2023-07-18 DIAGNOSIS — Z00.00 ANNUAL PHYSICAL EXAM: ICD-10-CM

## 2023-07-18 LAB
ALBUMIN SERPL BCP-MCNC: 3.7 G/DL (ref 3.5–5.2)
ALP SERPL-CCNC: 66 U/L (ref 55–135)
ALT SERPL W/O P-5'-P-CCNC: 13 U/L (ref 10–44)
ANION GAP SERPL CALC-SCNC: 10 MMOL/L (ref 8–16)
AST SERPL-CCNC: 22 U/L (ref 10–40)
BASOPHILS # BLD AUTO: 0.02 K/UL (ref 0–0.2)
BASOPHILS NFR BLD: 0.3 % (ref 0–1.9)
BILIRUB SERPL-MCNC: 0.3 MG/DL (ref 0.1–1)
BUN SERPL-MCNC: 14 MG/DL (ref 6–20)
CALCIUM SERPL-MCNC: 9.4 MG/DL (ref 8.7–10.5)
CHLORIDE SERPL-SCNC: 109 MMOL/L (ref 95–110)
CHOLEST SERPL-MCNC: 227 MG/DL (ref 120–199)
CHOLEST/HDLC SERPL: 3.2 {RATIO} (ref 2–5)
CO2 SERPL-SCNC: 23 MMOL/L (ref 23–29)
CREAT SERPL-MCNC: 0.8 MG/DL (ref 0.5–1.4)
DIFFERENTIAL METHOD: ABNORMAL
EOSINOPHIL # BLD AUTO: 0.3 K/UL (ref 0–0.5)
EOSINOPHIL NFR BLD: 4.4 % (ref 0–8)
ERYTHROCYTE [DISTWIDTH] IN BLOOD BY AUTOMATED COUNT: 12.2 % (ref 11.5–14.5)
EST. GFR  (NO RACE VARIABLE): >60 ML/MIN/1.73 M^2
ESTIMATED AVG GLUCOSE: 108 MG/DL (ref 68–131)
GLUCOSE SERPL-MCNC: 87 MG/DL (ref 70–110)
HBA1C MFR BLD: 5.4 % (ref 4–5.6)
HCT VFR BLD AUTO: 42.1 % (ref 37–48.5)
HDLC SERPL-MCNC: 71 MG/DL (ref 40–75)
HDLC SERPL: 31.3 % (ref 20–50)
HGB BLD-MCNC: 13.7 G/DL (ref 12–16)
IMM GRANULOCYTES # BLD AUTO: 0.01 K/UL (ref 0–0.04)
IMM GRANULOCYTES NFR BLD AUTO: 0.2 % (ref 0–0.5)
LDLC SERPL CALC-MCNC: 141.6 MG/DL (ref 63–159)
LYMPHOCYTES # BLD AUTO: 3.1 K/UL (ref 1–4.8)
LYMPHOCYTES NFR BLD: 52.4 % (ref 18–48)
MAGNESIUM SERPL-MCNC: 2 MG/DL (ref 1.6–2.6)
MCH RBC QN AUTO: 29.1 PG (ref 27–31)
MCHC RBC AUTO-ENTMCNC: 32.5 G/DL (ref 32–36)
MCV RBC AUTO: 89 FL (ref 82–98)
MONOCYTES # BLD AUTO: 0.5 K/UL (ref 0.3–1)
MONOCYTES NFR BLD: 7.6 % (ref 4–15)
NEUTROPHILS # BLD AUTO: 2.1 K/UL (ref 1.8–7.7)
NEUTROPHILS NFR BLD: 35.1 % (ref 38–73)
NONHDLC SERPL-MCNC: 156 MG/DL
NRBC BLD-RTO: 0 /100 WBC
PLATELET # BLD AUTO: 295 K/UL (ref 150–450)
PMV BLD AUTO: 9.1 FL (ref 9.2–12.9)
POTASSIUM SERPL-SCNC: 4.5 MMOL/L (ref 3.5–5.1)
PROT SERPL-MCNC: 6.6 G/DL (ref 6–8.4)
RBC # BLD AUTO: 4.71 M/UL (ref 4–5.4)
SODIUM SERPL-SCNC: 142 MMOL/L (ref 136–145)
TRIGL SERPL-MCNC: 72 MG/DL (ref 30–150)
TSH SERPL DL<=0.005 MIU/L-ACNC: 3.76 UIU/ML (ref 0.4–4)
VIT B12 SERPL-MCNC: 577 PG/ML (ref 210–950)
WBC # BLD AUTO: 5.95 K/UL (ref 3.9–12.7)

## 2023-07-18 PROCEDURE — 36415 COLL VENOUS BLD VENIPUNCTURE: CPT | Mod: PO | Performed by: INTERNAL MEDICINE

## 2023-07-18 PROCEDURE — 82607 VITAMIN B-12: CPT | Performed by: INTERNAL MEDICINE

## 2023-07-18 PROCEDURE — 84443 ASSAY THYROID STIM HORMONE: CPT | Performed by: INTERNAL MEDICINE

## 2023-07-18 PROCEDURE — 80053 COMPREHEN METABOLIC PANEL: CPT | Performed by: INTERNAL MEDICINE

## 2023-07-18 PROCEDURE — 83735 ASSAY OF MAGNESIUM: CPT | Performed by: INTERNAL MEDICINE

## 2023-07-18 PROCEDURE — 83036 HEMOGLOBIN GLYCOSYLATED A1C: CPT | Performed by: INTERNAL MEDICINE

## 2023-07-18 PROCEDURE — 85025 COMPLETE CBC W/AUTO DIFF WBC: CPT | Performed by: INTERNAL MEDICINE

## 2023-07-18 PROCEDURE — 80061 LIPID PANEL: CPT | Performed by: INTERNAL MEDICINE

## 2023-08-01 ENCOUNTER — OFFICE VISIT (OUTPATIENT)
Dept: INTERNAL MEDICINE | Facility: CLINIC | Age: 63
End: 2023-08-01
Attending: INTERNAL MEDICINE
Payer: COMMERCIAL

## 2023-08-01 VITALS
BODY MASS INDEX: 22.2 KG/M2 | SYSTOLIC BLOOD PRESSURE: 120 MMHG | HEIGHT: 64 IN | WEIGHT: 130.06 LBS | HEART RATE: 74 BPM | OXYGEN SATURATION: 98 % | DIASTOLIC BLOOD PRESSURE: 70 MMHG

## 2023-08-01 DIAGNOSIS — Z00.00 ANNUAL PHYSICAL EXAM: Primary | ICD-10-CM

## 2023-08-01 DIAGNOSIS — N39.0 RECURRENT UTI: ICD-10-CM

## 2023-08-01 DIAGNOSIS — K21.9 GASTROESOPHAGEAL REFLUX DISEASE, UNSPECIFIED WHETHER ESOPHAGITIS PRESENT: ICD-10-CM

## 2023-08-01 DIAGNOSIS — Z87.442 HISTORY OF NEPHROLITHIASIS: ICD-10-CM

## 2023-08-01 DIAGNOSIS — R73.03 PREDIABETES: ICD-10-CM

## 2023-08-01 PROCEDURE — 1159F PR MEDICATION LIST DOCUMENTED IN MEDICAL RECORD: ICD-10-PCS | Mod: CPTII,S$GLB,, | Performed by: INTERNAL MEDICINE

## 2023-08-01 PROCEDURE — 99396 PREV VISIT EST AGE 40-64: CPT | Mod: S$GLB,,, | Performed by: INTERNAL MEDICINE

## 2023-08-01 PROCEDURE — 3044F PR MOST RECENT HEMOGLOBIN A1C LEVEL <7.0%: ICD-10-PCS | Mod: CPTII,S$GLB,, | Performed by: INTERNAL MEDICINE

## 2023-08-01 PROCEDURE — 99999 PR PBB SHADOW E&M-EST. PATIENT-LVL III: CPT | Mod: PBBFAC,,, | Performed by: INTERNAL MEDICINE

## 2023-08-01 PROCEDURE — 3074F SYST BP LT 130 MM HG: CPT | Mod: CPTII,S$GLB,, | Performed by: INTERNAL MEDICINE

## 2023-08-01 PROCEDURE — 3044F HG A1C LEVEL LT 7.0%: CPT | Mod: CPTII,S$GLB,, | Performed by: INTERNAL MEDICINE

## 2023-08-01 PROCEDURE — 1159F MED LIST DOCD IN RCRD: CPT | Mod: CPTII,S$GLB,, | Performed by: INTERNAL MEDICINE

## 2023-08-01 PROCEDURE — 3078F PR MOST RECENT DIASTOLIC BLOOD PRESSURE < 80 MM HG: ICD-10-PCS | Mod: CPTII,S$GLB,, | Performed by: INTERNAL MEDICINE

## 2023-08-01 PROCEDURE — 3078F DIAST BP <80 MM HG: CPT | Mod: CPTII,S$GLB,, | Performed by: INTERNAL MEDICINE

## 2023-08-01 PROCEDURE — 3008F BODY MASS INDEX DOCD: CPT | Mod: CPTII,S$GLB,, | Performed by: INTERNAL MEDICINE

## 2023-08-01 PROCEDURE — 99999 PR PBB SHADOW E&M-EST. PATIENT-LVL III: ICD-10-PCS | Mod: PBBFAC,,, | Performed by: INTERNAL MEDICINE

## 2023-08-01 PROCEDURE — 3008F PR BODY MASS INDEX (BMI) DOCUMENTED: ICD-10-PCS | Mod: CPTII,S$GLB,, | Performed by: INTERNAL MEDICINE

## 2023-08-01 PROCEDURE — 99396 PR PREVENTIVE VISIT,EST,40-64: ICD-10-PCS | Mod: S$GLB,,, | Performed by: INTERNAL MEDICINE

## 2023-08-01 PROCEDURE — 3074F PR MOST RECENT SYSTOLIC BLOOD PRESSURE < 130 MM HG: ICD-10-PCS | Mod: CPTII,S$GLB,, | Performed by: INTERNAL MEDICINE

## 2023-08-01 NOTE — PROGRESS NOTES
Subjective:       Patient ID: Xochitl Weiss is a 62 y.o. female.    Chief Complaint: Annual Exam     Xochitl Weiss is a 62 y.o.  female who presents for Annual Exam  .  Labs with prediabetes last year. Hs been using the [employer] key and has lost weight. Hba1c now in normal range.  Avoiding extra snacking, poor choices.     Watching her grandson twice a week, active lifestyle.        Patient Active Problem List   Diagnosis    Encounter for long-term (current) use of other medications    Family history of colon cancer    GERD (gastroesophageal reflux disease)    Family history of breast cancer    Premature menopause on HRT    Hemorrhoids, internal    History of diverticulitis    Food impaction of esophagus    Esophageal foreign body    Recurrent UTI    Dysphagia    At high risk for breast cancer    Family history of colon cancer in father    Reflux esophagitis    Prediabetes    History of nephrolithiasis           Past Medical History:   Diagnosis Date    BRCA negative     Colon polyp     Diverticula of colon     Diverticulitis     Family history of breast cancer     mother    Family history of colon cancer     2 family members over age 60    General anesthetics causing adverse effect in therapeutic use     per patient report slow to awaken    Genetic testing     negative Integrated BRACAnalysis    GERD (gastroesophageal reflux disease)     Hydronephrosis 2015    Kidney stones     Mild hyperlipidemia        Past Surgical History:   Procedure Laterality Date    breast augmentation      BREAST CYST EXCISION Left 2021    BREAST SURGERY      implant removal in 2020     SECTION      x 2    COLONOSCOPY      COLONOSCOPY N/A 3/8/2017    Procedure: COLONOSCOPY;  Surgeon: Torey Macias MD;  Location: [address]);  Service: Endoscopy;  Laterality: N/A;    COLONOSCOPY N/A 2019    Procedure: COLONOSCOPY;  Surgeon: Torey Macias MD;  Location: [facility] ([address] [place] [address]);  Service:  Endoscopy;  Laterality: N/A;    COLONOSCOPY N/A 3/31/2022    Procedure: COLONOSCOPY;  Surgeon: Torey Macias MD;  Location: [facility] (4TH FLR);  Service: Endoscopy;  Laterality: N/A;   covid test 3/28 @ [place]; instructions to [place]-    ESOPHAGOGASTRODUODENOSCOPY N/A 10/26/2021    Procedure: EGD (ESOPHAGOGASTRODUODENOSCOPY);  Surgeon: Torey Macias MD;  Location: [facility] (4TH FLR);  Service: Endoscopy;  Laterality: N/A;  Covid test 10/23 [place], instructions sent to [facility]    EYE SURGERY      blephroplasty    HYSTERECTOMY      AUB    OOPHORECTOMY      TONSILLECTOMY  1979    TRIGGER FINGER RELEASE Right 2017    thumb and 3rd digit    TUBAL LIGATION         Family History   Problem Relation Age of Onset    Colon cancer Father 68    Cancer Father         Colon    Breast cancer Mother 68    Colon cancer Mother 70        twice    Uterine cancer Mother 30    Diabetes Mother     Hypertension Mother     Cancer Mother         Uterine, Breast & Colon    Ovarian cancer Maternal Aunt 60    Lung cancer Maternal Uncle 60    No Known Problems Daughter     No Known Problems Son      labor Neg Hx     Eclampsia Neg Hx     Pancreatic cancer Neg Hx     Kidney cancer Neg Hx     Celiac disease Neg Hx     Cirrhosis Neg Hx     Colon polyps Neg Hx     Crohn's disease Neg Hx     Esophageal cancer Neg Hx     Inflammatory bowel disease Neg Hx     Liver cancer Neg Hx     Liver disease Neg Hx     Rectal cancer Neg Hx     Stomach cancer Neg Hx     Ulcerative colitis Neg Hx        Social History     Tobacco Use    Smoking status: Never    Smokeless tobacco: Never   Substance Use Topics    Alcohol use: Yes     Alcohol/week: 4.0 standard drinks of alcohol     Types: 4 Glasses of wine per week     Comment: socially    Drug use: No         Review of Systems   Constitutional:  Negative for chills and fever.   Respiratory:  Negative for cough and shortness of breath.    Cardiovascular:  Negative for chest pain  "and palpitations.   Gastrointestinal:  Negative for nausea and vomiting.   Genitourinary:  Negative for dysuria and hematuria.         Objective:   Blood pressure 120/70, pulse 74, height 5' 4" (1.626 m), weight 59 kg (130 lb 1.1 oz), SpO2 98 %.     Physical Exam  Constitutional:       Appearance: Normal appearance. She is well-developed. She is not ill-appearing.   HENT:      Head: Normocephalic and atraumatic.   Eyes:      General: No scleral icterus.     Conjunctiva/sclera: Conjunctivae normal.   Cardiovascular:      Rate and Rhythm: Normal rate.      Heart sounds: Normal heart sounds. No murmur heard.     No friction rub. No gallop.   Pulmonary:      Effort: Pulmonary effort is normal.      Breath sounds: Normal breath sounds. No wheezing or rales.   Chest:      Chest wall: No tenderness.   Abdominal:      General: Bowel sounds are normal.      Palpations: Abdomen is soft.   Musculoskeletal:         General: No tenderness or signs of injury.   Skin:     General: Skin is warm and dry.   Neurological:      Mental Status: She is alert and oriented to person, place, and time. Mental status is at baseline.   Psychiatric:         Behavior: Behavior normal.         Thought Content: Thought content normal.         Prior labs reviewed    Health Maintenance         Date Due Completion Date    Mammogram 08/23/2023 8/23/2022    Influenza Vaccine (1) 09/01/2023 9/12/2022    Override on 9/18/2019: Done (completed @ [facility] pharmacy)    TETANUS VACCINE 09/17/2025 9/17/2015    Colorectal Cancer Screening 03/31/2027 3/31/2022    Lipid Panel 07/18/2028 7/18/2023            Assessment/Plan:       1. Annual physical exam  Recommend daily sunscreen, cardiovascular exercise min 30 min 5 days per week. Seatbelts routinely.    Recommend monthly self breast exams and annual mammogram OVER AGE 40 or as recommended.  Also screening  pap with reflex HPV q 3 years or as recommended by gyn provider.    2. Gastroesophageal reflux disease, " unspecified whether esophagitis present  Not taking daily  Followed with dr zambrano    3. Recurrent UTI  Overview:  Started after pregnancy, some scar tissue, small left kidney  Manages with increased hydration, early intervention   Occurs less than once per year      Assessment & Plan:  Call asap if symptoms develop  Consider referral to urology or urogyn if increased frequency      4. Prediabetes  Overview:  Resolved with wt loss and diet changes      5. History of nephrolithiasis  Overview:  One episode, treated by dr hall, in her 40s             Medication List with Changes/Refills   Current Medications    ESTRADIOL (VAGIFEM) 10 MCG TAB    INSERT 1 TABLET(10 MCG) VAGINALLY 2 TIMES A WEEK    PANTOPRAZOLE (PROTONIX) 20 MG TABLET    Take 1 tablet (20 mg total) by mouth before breakfast.    TRIAMCINOLONE ACETONIDE 0.1% (KENALOG) 0.1 % CREAM    Apply topically 2 (two) times daily.   Discontinued Medications    AZELASTINE (ASTELIN) 137 MCG (0.1 %) NASAL SPRAY    2 sprays (274 mcg total) by Nasal route 2 (two) times daily.    BENZONATATE (TESSALON) 200 MG CAPSULE    Take 1 capsule (200 mg total) by mouth 3 (three) times daily as needed for Cough.       Recommend patient complete vaccinations as listed in the overdue health maintenance report. Pt may obtain vaccinations at their local pharmacy, any [facility] pharmacy or request vaccination in our clinic.  Please note that some insurances will only cover vaccination cost at specific locations.Please check with your insurance provider regarding your coverage.  Vaccines that are not covered are still recommended.

## 2023-08-11 ENCOUNTER — PATIENT MESSAGE (OUTPATIENT)
Dept: INTERNAL MEDICINE | Facility: CLINIC | Age: 63
End: 2023-08-11
Payer: COMMERCIAL

## 2023-08-11 ENCOUNTER — E-VISIT (OUTPATIENT)
Dept: INTERNAL MEDICINE | Facility: CLINIC | Age: 63
End: 2023-08-11
Attending: INTERNAL MEDICINE
Payer: COMMERCIAL

## 2023-08-11 ENCOUNTER — LAB VISIT (OUTPATIENT)
Dept: LAB | Facility: HOSPITAL | Age: 63
End: 2023-08-11
Payer: COMMERCIAL

## 2023-08-11 DIAGNOSIS — N39.0 RECURRENT UTI: Primary | ICD-10-CM

## 2023-08-11 DIAGNOSIS — R30.0 BURNING WITH URINATION: ICD-10-CM

## 2023-08-11 DIAGNOSIS — R30.0 BURNING WITH URINATION: Primary | ICD-10-CM

## 2023-08-11 PROCEDURE — 87086 URINE CULTURE/COLONY COUNT: CPT | Performed by: STUDENT IN AN ORGANIZED HEALTH CARE EDUCATION/TRAINING PROGRAM

## 2023-08-11 PROCEDURE — 81003 URINALYSIS AUTO W/O SCOPE: CPT | Performed by: STUDENT IN AN ORGANIZED HEALTH CARE EDUCATION/TRAINING PROGRAM

## 2023-08-11 PROCEDURE — 99421 OL DIG E/M SVC 5-10 MIN: CPT | Mod: ,,, | Performed by: STUDENT IN AN ORGANIZED HEALTH CARE EDUCATION/TRAINING PROGRAM

## 2023-08-11 PROCEDURE — 99421 PR E&M, ONLINE DIGIT, EST, < 7 DAYS, 5-10 MINS: ICD-10-PCS | Mod: ,,, | Performed by: STUDENT IN AN ORGANIZED HEALTH CARE EDUCATION/TRAINING PROGRAM

## 2023-08-11 RX ORDER — CEFDINIR 300 MG/1
300 CAPSULE ORAL 2 TIMES DAILY
Qty: 14 CAPSULE | Refills: 0 | Status: SHIPPED | OUTPATIENT
Start: 2023-08-11 | End: 2023-08-18

## 2023-08-11 NOTE — TELEPHONE ENCOUNTER
Patient has been contacted in regards to message received. Patient states she experiencing some discomfort associated with a UTI. Patient states symptoms of burning will urinating as well as pain. Patient denies back pain or fever. Patient informed Dr Stoddard is currently out of office and the covering provider will be contacted on he behalf. Patient voices understanding.

## 2023-08-11 NOTE — PROGRESS NOTES
Patient ID: Xochitl Weiss is a 62 y.o. female.    Chief Complaint: Urinary Tract Infection (Entered automatically based on patient selection in Patient Portal.)    The patient initiated a request through [facility] on 8/11/2023 for evaluation and management with a chief complaint of Urinary Tract Infection (Entered automatically based on patient selection in Patient Portal.)     I evaluated the questionnaire submission on 8/11/2023.    [facility] P[facility] Evisit Uti Questionnaire    8/11/2023  4:24 PM CDT - Filed by Patient   Do you agree to participate in an E-Visit? Yes   If you have any of the following problems, please present to your local ER or call 911:  I acknowledge   What is the main issue that you would like for your doctor to address today? bladder infection   Are you able to take your vital signs? No   What symptoms do you currently have? Pain while passing urine   When did your symptoms first appear? 8/9/2023   List what you have done or taken to help your symptoms. Drinking lots of cramberry and water, took home test and showed positive for bladder infection   Please indicate whether you have had the following symptoms during the past 24 hours     Urgent urination (a sudden and uncontrollable urge to urinate) Mild   Frequent urination of small amounts of urine (going to the toilet very often) Moderate   Burning pain when urinating Mild   Incomplete bladder emptying (still feel like you need to urinate again after urination) Mild   Pain not associated with urination in the lower abdomen below the belly button) None   What does your urine look like? I am not sure   Blood seen in the urine None   Flank pain (pain in one or both sides of the lower back) None   Abnormal Vaginal Discharge (abnormal amount, color and/or odor) None   Discharge from the urethra (urinary opening) without urination None   High body temperature/fever? None-<99.5   Please rate how much discomfort you have experience because of the symptoms  in the past 24 hours: Mild   Please indicate how the symptoms have interfered with your every day activities/work in the past 24 hours: Mild   Please indicate how these symptoms have interfered with your social activities (visiting people, meeting with friends, etc.) in the past 24 hours? Mild   Are you a diabetic? No   Please indicate whether you have the following at the time of completion of this questionnaire: None of the above   Provide any information you feel is important to your history not asked above Bery familiar with bladder infections over years   Please attach any relevant images or files (if you have performed a home test for UTI, please submit a photo of results)          Recent Labs Obtained:  No visits with results within 7 Day(s) from this visit.   Latest known visit with results is:   Lab Visit on 07/18/2023   Component Date Value Ref Range Status    Sodium 07/18/2023 142  136 - 145 mmol/L Final    Potassium 07/18/2023 4.5  3.5 - 5.1 mmol/L Final    Chloride 07/18/2023 109  95 - 110 mmol/L Final    CO2 07/18/2023 23  23 - 29 mmol/L Final    Glucose 07/18/2023 87  70 - 110 mg/dL Final    BUN 07/18/2023 14  6 - 20 mg/dL Final    Creatinine 07/18/2023 0.8  0.5 - 1.4 mg/dL Final    Calcium 07/18/2023 9.4  8.7 - 10.5 mg/dL Final    Total Protein 07/18/2023 6.6  6.0 - 8.4 g/dL Final    Albumin 07/18/2023 3.7  3.5 - 5.2 g/dL Final    Total Bilirubin 07/18/2023 0.3  0.1 - 1.0 mg/dL Final    Comment: For infants and newborns, interpretation of results should be based  on gestational age, weight and in agreement with clinical  observations.    Premature Infant recommended reference ranges:  Up to 24 hours.............<8.0 mg/dL  Up to 48 hours............<12.0 mg/dL  3-5 days..................<15.0 mg/dL  6-29 days.................<15.0 mg/dL      Alkaline Phosphatase 07/18/2023 66  55 - 135 U/L Final    AST 07/18/2023 22  10 - 40 U/L Final    ALT 07/18/2023 13  10 - 44 U/L Final    eGFR 07/18/2023 >60.0   >60 mL/min/1.73 m^2 Final    Anion Gap 07/18/2023 10  8 - 16 mmol/L Final    Cholesterol 07/18/2023 227 (H)  120 - 199 mg/dL Final    Comment: The National Cholesterol Education Program (NCEP) has set the  following guidelines (reference ranges) for Cholesterol:  Optimal.....................<200 mg/dL  Borderline High.............200-239 mg/dL  High........................> or = 240 mg/dL      Triglycerides 07/18/2023 72  30 - 150 mg/dL Final    Comment: The National Cholesterol Education Program (NCEP) has set the  following guidelines (reference values) for triglycerides:  Normal......................<150 mg/dL  Borderline High.............150-199 mg/dL  High........................200-499 mg/dL      HDL 07/18/2023 71  40 - 75 mg/dL Final    Comment: The National Cholesterol Education Program (NCEP) has set the  following guidelines (reference values) for HDL Cholesterol:  Low...............<40 mg/dL  Optimal...........>60 mg/dL      LDL Cholesterol 07/18/2023 141.6  63.0 - 159.0 mg/dL Final    Comment: The National Cholesterol Education Program (NCEP) has set the  following guidelines (reference values) for LDL Cholesterol:  Optimal.......................<130 mg/dL  Borderline High...............130-159 mg/dL  High..........................160-189 mg/dL  Very High.....................>190 mg/dL      HDL/Cholesterol Ratio 07/18/2023 31.3  20.0 - 50.0 % Final    Total Cholesterol/HDL Ratio 07/18/2023 3.2  2.0 - 5.0 Final    Non-HDL Cholesterol 07/18/2023 156  mg/dL Final    Comment: Risk category and Non-HDL cholesterol goals:  Coronary heart disease (CHD)or equivalent (10-year risk of CHD >20%):  Non-HDL cholesterol goal     <130 mg/dL  Two or more CHD risk factors and 10-year risk of CHD <= 20%:  Non-HDL cholesterol goal     <160 mg/dL  0 to 1 CHD risk factor:  Non-HDL cholesterol goal     <190 mg/dL      Hemoglobin A1C 07/18/2023 5.4  4.0 - 5.6 % Final    Comment: ADA Screening Guidelines:  5.7-6.4%  Consistent  with prediabetes  >or=6.5%  Consistent with diabetes    High levels of fetal hemoglobin interfere with the HbA1C  assay. Heterozygous hemoglobin variants (HbS, HgC, etc)do  not significantly interfere with this assay.   However, presence of multiple variants may affect accuracy.      Estimated Avg Glucose 07/18/2023 108  68 - 131 mg/dL Final    TSH 07/18/2023 3.764  0.400 - 4.000 uIU/mL Final    WBC 07/18/2023 5.95  3.90 - 12.70 K/uL Final    RBC 07/18/2023 4.71  4.00 - 5.40 M/uL Final    Hemoglobin 07/18/2023 13.7  12.0 - 16.0 g/dL Final    Hematocrit 07/18/2023 42.1  37.0 - 48.5 % Final    MCV 07/18/2023 89  82 - 98 fL Final    MCH 07/18/2023 29.1  27.0 - 31.0 pg Final    MCHC 07/18/2023 32.5  32.0 - 36.0 g/dL Final    RDW 07/18/2023 12.2  11.5 - 14.5 % Final    Platelets 07/18/2023 295  150 - 450 K/uL Final    MPV 07/18/2023 9.1 (L)  9.2 - 12.9 fL Final    Immature Granulocytes 07/18/2023 0.2  0.0 - 0.5 % Final    Gran # (ANC) 07/18/2023 2.1  1.8 - 7.7 K/uL Final    Immature Grans (Abs) 07/18/2023 0.01  0.00 - 0.04 K/uL Final    Comment: Mild elevation in immature granulocytes is non specific and   can be seen in a variety of conditions including stress response,   acute inflammation, trauma and pregnancy. Correlation with other   laboratory and clinical findings is essential.      Lymph # 07/18/2023 3.1  1.0 - 4.8 K/uL Final    Mono # 07/18/2023 0.5  0.3 - 1.0 K/uL Final    Eos # 07/18/2023 0.3  0.0 - 0.5 K/uL Final    Baso # 07/18/2023 0.02  0.00 - 0.20 K/uL Final    nRBC 07/18/2023 0  0 /100 WBC Final    Gran % 07/18/2023 35.1 (L)  38.0 - 73.0 % Final    Lymph % 07/18/2023 52.4 (H)  18.0 - 48.0 % Final    Mono % 07/18/2023 7.6  4.0 - 15.0 % Final    Eosinophil % 07/18/2023 4.4  0.0 - 8.0 % Final    Basophil % 07/18/2023 0.3  0.0 - 1.9 % Final    Differential Method 07/18/2023 Automated   Final    Magnesium 07/18/2023 2.0  1.6 - 2.6 mg/dL Final    Vitamin B-12 07/18/2023 577  210 - 950 pg/mL Final        Encounter Diagnosis   Name Primary?    Recurrent UTI Yes        Orders Placed This Encounter   Procedures    Ambulatory referral/consult to Urology     Standing Status:   Future     Standing Expiration Date:   9/11/2024     Referral Priority:   Routine     Referral Type:   Consultation     Referral Reason:   Specialty Services Required     Requested Specialty:   Urology     Number of Visits Requested:   1            No follow-ups on file.      E-Visit Time Tracking:    Day 1 Time (in minutes): 8     Total Time (in minutes): 8

## 2023-08-12 LAB
BILIRUB UR QL STRIP: NEGATIVE
CLARITY UR REFRACT.AUTO: CLEAR
COLOR UR AUTO: NORMAL
GLUCOSE UR QL STRIP: NEGATIVE
HGB UR QL STRIP: NEGATIVE
KETONES UR QL STRIP: NEGATIVE
LEUKOCYTE ESTERASE UR QL STRIP: NEGATIVE
NITRITE UR QL STRIP: NEGATIVE
PH UR STRIP: 6 [PH] (ref 5–8)
PROT UR QL STRIP: NEGATIVE
SP GR UR STRIP: 1.01 (ref 1–1.03)
URN SPEC COLLECT METH UR: NORMAL

## 2023-08-13 LAB — BACTERIA UR CULT: NO GROWTH

## 2023-09-01 ENCOUNTER — HOSPITAL ENCOUNTER (OUTPATIENT)
Dept: RADIOLOGY | Facility: OTHER | Age: 63
Discharge: HOME | End: 2023-09-01
Attending: NURSE PRACTITIONER
Payer: COMMERCIAL

## 2023-09-01 DIAGNOSIS — Z80.3 FAMILY HISTORY OF BREAST CANCER IN FIRST DEGREE RELATIVE: ICD-10-CM

## 2023-09-01 DIAGNOSIS — R92.30 DENSE BREAST TISSUE ON MAMMOGRAM: ICD-10-CM

## 2023-09-01 DIAGNOSIS — Z12.31 ENCOUNTER FOR SCREENING MAMMOGRAM FOR MALIGNANT NEOPLASM OF BREAST: ICD-10-CM

## 2023-09-01 DIAGNOSIS — Z91.89 AT HIGH RISK FOR BREAST CANCER: ICD-10-CM

## 2023-09-01 PROCEDURE — 77067 MAMMO DIGITAL SCREENING BILAT WITH TOMO: ICD-10-PCS | Mod: 26,,, | Performed by: RADIOLOGY

## 2023-09-01 PROCEDURE — 77063 BREAST TOMOSYNTHESIS BI: CPT | Mod: 26,,, | Performed by: RADIOLOGY

## 2023-09-01 PROCEDURE — 77063 MAMMO DIGITAL SCREENING BILAT WITH TOMO: ICD-10-PCS | Mod: 26,,, | Performed by: RADIOLOGY

## 2023-09-01 PROCEDURE — 77067 SCR MAMMO BI INCL CAD: CPT | Mod: 26,,, | Performed by: RADIOLOGY

## 2023-09-01 PROCEDURE — 77067 SCR MAMMO BI INCL CAD: CPT | Mod: TC

## 2023-09-08 DIAGNOSIS — Z91.89 AT HIGH RISK FOR BREAST CANCER: Primary | ICD-10-CM

## 2023-09-08 DIAGNOSIS — R92.30 DENSE BREAST TISSUE ON MAMMOGRAM: ICD-10-CM

## 2023-09-08 DIAGNOSIS — Z80.3 FAMILY HISTORY OF BREAST CANCER IN FIRST DEGREE RELATIVE: ICD-10-CM

## 2023-09-20 ENCOUNTER — TELEPHONE (OUTPATIENT)
Dept: HEMATOLOGY/ONCOLOGY | Facility: CLINIC | Age: 63
End: 2023-09-20
Payer: COMMERCIAL

## 2023-09-20 NOTE — TELEPHONE ENCOUNTER
----- Message from Maegan Angel sent at 9/20/2023 11:18 AM CDT -----  Type:  Patient Returning Call    Who Called:pt   Who Left Message for Patient: pt   Does the patient know what this is regarding?: pt was  calling she say she had a breast MRI  done in April   Would the patient rather a call back or a response via [facility]?  call  Best Call Back Number:[phone]  Additional Information:  she had it  done already and the DR put in a new order

## 2023-09-22 ENCOUNTER — OFFICE VISIT (OUTPATIENT)
Dept: OBSTETRICS AND GYNECOLOGY | Facility: CLINIC | Age: 63
End: 2023-09-22
Payer: COMMERCIAL

## 2023-09-22 VITALS
WEIGHT: 132.94 LBS | DIASTOLIC BLOOD PRESSURE: 70 MMHG | SYSTOLIC BLOOD PRESSURE: 110 MMHG | HEIGHT: 64 IN | BODY MASS INDEX: 22.7 KG/M2

## 2023-09-22 DIAGNOSIS — Z12.31 BREAST CANCER SCREENING BY MAMMOGRAM: ICD-10-CM

## 2023-09-22 DIAGNOSIS — Z01.419 ENCOUNTER FOR ANNUAL ROUTINE GYNECOLOGICAL EXAMINATION: Primary | ICD-10-CM

## 2023-09-22 DIAGNOSIS — N95.2 VAGINAL ATROPHY: ICD-10-CM

## 2023-09-22 PROBLEM — R73.03 PREDIABETES: Status: RESOLVED | Noted: 2023-08-01 | Resolved: 2023-09-22

## 2023-09-22 PROCEDURE — 3008F BODY MASS INDEX DOCD: CPT | Mod: CPTII,S$GLB,, | Performed by: OBSTETRICS & GYNECOLOGY

## 2023-09-22 PROCEDURE — 3044F HG A1C LEVEL LT 7.0%: CPT | Mod: CPTII,S$GLB,, | Performed by: OBSTETRICS & GYNECOLOGY

## 2023-09-22 PROCEDURE — 3078F PR MOST RECENT DIASTOLIC BLOOD PRESSURE < 80 MM HG: ICD-10-PCS | Mod: CPTII,S$GLB,, | Performed by: OBSTETRICS & GYNECOLOGY

## 2023-09-22 PROCEDURE — 1160F PR REVIEW ALL MEDS BY PRESCRIBER/CLIN PHARMACIST DOCUMENTED: ICD-10-PCS | Mod: CPTII,S$GLB,, | Performed by: OBSTETRICS & GYNECOLOGY

## 2023-09-22 PROCEDURE — 1159F MED LIST DOCD IN RCRD: CPT | Mod: CPTII,S$GLB,, | Performed by: OBSTETRICS & GYNECOLOGY

## 2023-09-22 PROCEDURE — 1159F PR MEDICATION LIST DOCUMENTED IN MEDICAL RECORD: ICD-10-PCS | Mod: CPTII,S$GLB,, | Performed by: OBSTETRICS & GYNECOLOGY

## 2023-09-22 PROCEDURE — 1160F RVW MEDS BY RX/DR IN RCRD: CPT | Mod: CPTII,S$GLB,, | Performed by: OBSTETRICS & GYNECOLOGY

## 2023-09-22 PROCEDURE — 3044F PR MOST RECENT HEMOGLOBIN A1C LEVEL <7.0%: ICD-10-PCS | Mod: CPTII,S$GLB,, | Performed by: OBSTETRICS & GYNECOLOGY

## 2023-09-22 PROCEDURE — 99396 PR PREVENTIVE VISIT,EST,40-64: ICD-10-PCS | Mod: S$GLB,,, | Performed by: OBSTETRICS & GYNECOLOGY

## 2023-09-22 PROCEDURE — 3074F PR MOST RECENT SYSTOLIC BLOOD PRESSURE < 130 MM HG: ICD-10-PCS | Mod: CPTII,S$GLB,, | Performed by: OBSTETRICS & GYNECOLOGY

## 2023-09-22 PROCEDURE — 3008F PR BODY MASS INDEX (BMI) DOCUMENTED: ICD-10-PCS | Mod: CPTII,S$GLB,, | Performed by: OBSTETRICS & GYNECOLOGY

## 2023-09-22 PROCEDURE — 99999 PR PBB SHADOW E&M-EST. PATIENT-LVL III: CPT | Mod: PBBFAC,,, | Performed by: OBSTETRICS & GYNECOLOGY

## 2023-09-22 PROCEDURE — 3078F DIAST BP <80 MM HG: CPT | Mod: CPTII,S$GLB,, | Performed by: OBSTETRICS & GYNECOLOGY

## 2023-09-22 PROCEDURE — 99396 PREV VISIT EST AGE 40-64: CPT | Mod: S$GLB,,, | Performed by: OBSTETRICS & GYNECOLOGY

## 2023-09-22 PROCEDURE — 3074F SYST BP LT 130 MM HG: CPT | Mod: CPTII,S$GLB,, | Performed by: OBSTETRICS & GYNECOLOGY

## 2023-09-22 PROCEDURE — 99999 PR PBB SHADOW E&M-EST. PATIENT-LVL III: ICD-10-PCS | Mod: PBBFAC,,, | Performed by: OBSTETRICS & GYNECOLOGY

## 2023-09-22 RX ORDER — ESTRADIOL 10 UG/1
INSERT VAGINAL
Qty: 24 TABLET | Refills: 3 | Status: SHIPPED | OUTPATIENT
Start: 2023-09-22 | End: 2023-09-22 | Stop reason: ALTCHOICE

## 2023-09-22 RX ORDER — ESTRADIOL 10 UG/1
10 INSERT VAGINAL
Qty: 8 EACH | Refills: 11 | Status: SHIPPED | OUTPATIENT
Start: 2023-09-25 | End: 2023-10-25

## 2023-09-22 NOTE — PROGRESS NOTES
Chief Complaint: Well Woman Exam     HPI:      Xochitl Weiss is a 62 y.o.  s/p hysterectomy/BSO for AUB who presents today for well woman exam.  Has been on systemic ET after hysterectomy. More recently has only been using vaginal estrogen. No issues, problems, or complaints. Specifically, patient denies abnormal vaginal bleeding, discharge, pelvic pain, urinary problems, or changes in appetite. Ms. Weiss is currently sexually active with a single male partner.  Notes dyspareunia. She declines STD screening today.    Previous Pap:      (No result found)  Previous Mammogram:   Results for orders placed during the hospital encounter of 23    Mammo Digital Screening Bilat w/ Zain    Narrative  Result:  Mammo Digital Screening Bilat w/ Zain    History:  Patient is 62 y.o. and is seen for a screening mammogram.      Films Compared:  Compared to: 2023 MRI Breast w/wo Contrast, w/CAD, Bilateral, 2022 Mammo Digital Screening Bilat w/ Zain, 08/10/2021 Mammo Digital Diagnostic Bilat with Zain, 2021 Mammo Digital Diagnostic Left with Zain, and 2020 Mammo Digital Screening Bilat w/ Zain    Findings:  This procedure was performed using tomosynthesis.  Computer-aided detection was utilized in the interpretation of this examination.    The breasts are heterogeneously dense, which may obscure small masses. There is no evidence of suspicious masses, microcalcifications or architectural distortion. Free silicone again identified.    Impression  No mammographic evidence of malignancy.    BI-RADS Category 1: Negative    Recommendation:  Routine screening mammogram in 1 year is recommended.    Your estimated lifetime risk of breast cancer (to age 85) based on Tyrer-Cuzick risk assessment model is 13.62 %.  According to the American Cancer Society, patients with a lifetime breast cancer risk of 20% or higher might benefit from supplemental screening tests.     Most Recent Dexa:    Colonoscopy:     COVID vaccine: completed series  Gardasil:has never had     Patient Active Problem List   Diagnosis    Encounter for long-term (current) use of other medications    Family history of colon cancer    GERD (gastroesophageal reflux disease)    Family history of breast cancer    Premature menopause on HRT    Hemorrhoids, internal    History of diverticulitis    Food impaction of esophagus    Esophageal foreign body    Recurrent UTI    Dysphagia    At high risk for breast cancer    Family history of colon cancer in father    Reflux esophagitis    History of nephrolithiasis       Past Medical History:   Diagnosis Date    BRCA negative     Colon polyp     Diverticula of colon     Diverticulitis     Family history of breast cancer     mother    Family history of colon cancer     2 family members over age 60    General anesthetics causing adverse effect in therapeutic use     per patient report slow to awaken    Genetic testing     negative Integrated BRACAnalysis    GERD (gastroesophageal reflux disease)     Hydronephrosis 2015    Kidney stones     Mild hyperlipidemia     Prediabetes 2023    Resolved with wt loss and diet changes       Past Surgical History:   Procedure Laterality Date    breast augmentation      BREAST CYST EXCISION Left 2021    BREAST SURGERY      implant removal in 2020     SECTION      x 2    COLONOSCOPY      COLONOSCOPY N/A 3/8/2017    Procedure: COLONOSCOPY;  Surgeon: Torey Macias MD;  Location: [facility] ([address] [place] [address]);  Service: Endoscopy;  Laterality: N/A;    COLONOSCOPY N/A 2019    Procedure: COLONOSCOPY;  Surgeon: Torey Macias MD;  Location: [facility] ([facility]  Service: Endoscopy;  Laterality: N/A;    COLONOSCOPY N/A 3/31/2022    Procedure: COLONOSCOPY;  Surgeon: Torey Macias MD;  Location: [facility] ([address] [place] [address]);  Service: Endoscopy;  Laterality: N/A;   covid test 3/28 @ [place]; instructions to portal-st     "ESOPHAGOGASTRODUODENOSCOPY N/A 10/26/2021    Procedure: EGD (ESOPHAGOGASTRODUODENOSCOPY);  Surgeon: Torey Macias MD;  Location: [facility] ([address] [place] [address]);  Service: Endoscopy;  Laterality: N/A;  Covid test 10/23 [place], instructions sent to [email or web address]    EYE SURGERY      blephroplasty    HYSTERECTOMY      AUB    OOPHORECTOMY      TONSILLECTOMY  1979    TRIGGER FINGER RELEASE Right 2017    thumb and 3rd digit    TUBAL LIGATION         OB History          2    Para   2    Term   2            AB        Living   2         SAB        IAB        Ectopic        Multiple        Live Births                     ROS:     Review of Systems   Constitutional:  Negative for activity change, fatigue and unexpected weight change.   Respiratory:  Negative for shortness of breath.    Cardiovascular:  Negative for chest pain.   Gastrointestinal:  Negative for abdominal pain, blood in stool, constipation and diarrhea.   Endocrine: Negative for cold intolerance and heat intolerance.   Genitourinary:  Positive for dyspareunia. Negative for bladder incontinence, dysuria, frequency, hot flashes, vaginal bleeding and vaginal dryness.   Integumentary:  Negative for breast mass, breast discharge and breast tenderness.   Psychiatric/Behavioral:  Negative for dysphoric mood. The patient is not nervous/anxious.    Breast: Negative for mass and tenderness      Physical Exam:      PHYSICAL EXAM:  /70 (BP Location: Left arm, Patient Position: Sitting, BP Method: Medium (Manual))   Ht 5' 4" (1.626 m)   Wt 60.3 kg (132 lb 15 oz)   BMI 22.82 kg/m²   Body mass index is 22.82 kg/m².     APPEARANCE: Well nourished, well developed, in no acute distress.  PSYCH: Appropriate mood and affect.  SKIN: No acne or hirsutism  NECK: Neck symmetric without masses or thyromegaly  NODES: No inguinal, axillary, or supraclavicular lymph node enlargement  ABDOMEN: Soft.  No tenderness or masses.    CARDIOVASCULAR: No edema of peripheral " extremities  BREASTS: Symmetrical, no skin changes or visible lesions.  No palpable masses or nipple discharge bilaterally.  PELVIC: Normal external genitalia without lesions.  Normal hair distribution.  Adequate perineal body, normal urethral meatus.  Vagina moist and well rugated without lesions or discharge. No significant cystocele or rectocele.  Uterus and cervix surgically absent. Adnexa without masses or tenderness.      Assessment:     1. Encounter for annual routine gynecological examination        2. Breast cancer screening by mammogram  Mammo Digital Screening Bilat w/ Zain      3. Vaginal atrophy  estradioL (IMVEXXY MAINTENANCE PACK) 10 mcg Inst    DISCONTINUED: estradioL (VAGIFEM) 10 mcg Tab            Plan:     Clinical breast exam performed.  Pap history reviewed.  No further pap screening indicated given hysterectomy for benign indications.  Mammogram UTD, repeat annually.  DEXA repeat at 67.  Colonoscopy UTD.  VVA: switch to Imvexxy.   Follow up in about 1 year (around 9/22/2024) for annual well woman exam or as needed.    Counseling:     Patient was counseled today on current ASCCP pap guidelines, the recommendation for yearly pelvic exams, healthy diet and exercise routines, breast self awareness and annual mammograms.She is to see her PCP for other health maintenance.     Use of the [facility] Patient Portal discussed and encouraged during today's visit.         Natalia Kay MD  [facility]  09/22/2023

## 2023-09-22 NOTE — PATIENT INSTRUCTIONS
Please check out the American College of Obstetricians [employer] Gynecologists PATIENT WEBSITE.  The site has education materials, patient stories, expert views, and a portal for you to ask questions.       [email or web address]      As always, please let me know if you have any questions.     Dr. Natalia Kay

## 2023-11-06 PROBLEM — N39.0 RECURRENT UTI: Status: RESOLVED | Noted: 2017-06-21 | Resolved: 2023-11-06

## 2024-02-28 ENCOUNTER — HOSPITAL ENCOUNTER (EMERGENCY)
Facility: HOSPITAL | Age: 64
Discharge: HOME | End: 2024-02-28
Attending: EMERGENCY MEDICINE
Payer: COMMERCIAL

## 2024-02-28 VITALS
HEIGHT: 64 IN | SYSTOLIC BLOOD PRESSURE: 111 MMHG | DIASTOLIC BLOOD PRESSURE: 73 MMHG | RESPIRATION RATE: 17 BRPM | WEIGHT: 130 LBS | TEMPERATURE: 98 F | BODY MASS INDEX: 22.2 KG/M2 | HEART RATE: 89 BPM | OXYGEN SATURATION: 96 %

## 2024-02-28 DIAGNOSIS — J02.0 STREP PHARYNGITIS: Primary | ICD-10-CM

## 2024-02-28 DIAGNOSIS — M62.838 NECK MUSCLE SPASM: ICD-10-CM

## 2024-02-28 DIAGNOSIS — J02.9 SORE THROAT: ICD-10-CM

## 2024-02-28 LAB
ALBUMIN SERPL BCP-MCNC: 4.3 G/DL (ref 3.5–5.2)
ALP SERPL-CCNC: 77 U/L (ref 55–135)
ALT SERPL W/O P-5'-P-CCNC: 15 U/L (ref 10–44)
ANION GAP SERPL CALC-SCNC: 12 MMOL/L (ref 8–16)
AST SERPL-CCNC: 21 U/L (ref 10–40)
BASOPHILS # BLD AUTO: 0.04 K/UL (ref 0–0.2)
BASOPHILS NFR BLD: 0.4 % (ref 0–1.9)
BILIRUB SERPL-MCNC: 0.4 MG/DL (ref 0.1–1)
BUN SERPL-MCNC: 18 MG/DL (ref 8–23)
CALCIUM SERPL-MCNC: 10.2 MG/DL (ref 8.7–10.5)
CHLORIDE SERPL-SCNC: 102 MMOL/L (ref 95–110)
CO2 SERPL-SCNC: 25 MMOL/L (ref 23–29)
CREAT SERPL-MCNC: 0.9 MG/DL (ref 0.5–1.4)
DIFFERENTIAL METHOD BLD: ABNORMAL
EOSINOPHIL # BLD AUTO: 0.1 K/UL (ref 0–0.5)
EOSINOPHIL NFR BLD: 0.9 % (ref 0–8)
ERYTHROCYTE [DISTWIDTH] IN BLOOD BY AUTOMATED COUNT: 11.8 % (ref 11.5–14.5)
EST. GFR  (NO RACE VARIABLE): >60 ML/MIN/1.73 M^2
GLUCOSE SERPL-MCNC: 90 MG/DL (ref 70–110)
GROUP A STREP, MOLECULAR: POSITIVE
HCT VFR BLD AUTO: 44.2 % (ref 37–48.5)
HGB BLD-MCNC: 14.4 G/DL (ref 12–16)
IMM GRANULOCYTES # BLD AUTO: 0.03 K/UL (ref 0–0.04)
IMM GRANULOCYTES NFR BLD AUTO: 0.3 % (ref 0–0.5)
LYMPHOCYTES # BLD AUTO: 2 K/UL (ref 1–4.8)
LYMPHOCYTES NFR BLD: 18 % (ref 18–48)
MCH RBC QN AUTO: 29.1 PG (ref 27–31)
MCHC RBC AUTO-ENTMCNC: 32.6 G/DL (ref 32–36)
MCV RBC AUTO: 89 FL (ref 82–98)
MONOCYTES # BLD AUTO: 1 K/UL (ref 0.3–1)
MONOCYTES NFR BLD: 9.2 % (ref 4–15)
NEUTROPHILS # BLD AUTO: 7.8 K/UL (ref 1.8–7.7)
NEUTROPHILS NFR BLD: 71.2 % (ref 38–73)
NRBC BLD-RTO: 0 /100 WBC
PLATELET # BLD AUTO: 327 K/UL (ref 150–450)
PMV BLD AUTO: 8.7 FL (ref 9.2–12.9)
POTASSIUM SERPL-SCNC: 4.2 MMOL/L (ref 3.5–5.1)
PROT SERPL-MCNC: 8.2 G/DL (ref 6–8.4)
RBC # BLD AUTO: 4.95 M/UL (ref 4–5.4)
SODIUM SERPL-SCNC: 139 MMOL/L (ref 136–145)
WBC # BLD AUTO: 10.93 K/UL (ref 3.9–12.7)

## 2024-02-28 PROCEDURE — 63600175 PHARM REV CODE 636 W HCPCS: Performed by: EMERGENCY MEDICINE

## 2024-02-28 PROCEDURE — 80053 COMPREHEN METABOLIC PANEL: CPT | Performed by: EMERGENCY MEDICINE

## 2024-02-28 PROCEDURE — 96361 HYDRATE IV INFUSION ADD-ON: CPT

## 2024-02-28 PROCEDURE — 99285 EMERGENCY DEPT VISIT HI MDM: CPT | Mod: 25

## 2024-02-28 PROCEDURE — 25500020 PHARM REV CODE 255: Performed by: EMERGENCY MEDICINE

## 2024-02-28 PROCEDURE — 96365 THER/PROPH/DIAG IV INF INIT: CPT | Mod: 59

## 2024-02-28 PROCEDURE — 25000003 PHARM REV CODE 250: Performed by: EMERGENCY MEDICINE

## 2024-02-28 PROCEDURE — 85025 COMPLETE CBC W/AUTO DIFF WBC: CPT | Performed by: EMERGENCY MEDICINE

## 2024-02-28 PROCEDURE — 87651 STREP A DNA AMP PROBE: CPT | Performed by: EMERGENCY MEDICINE

## 2024-02-28 PROCEDURE — 96375 TX/PRO/DX INJ NEW DRUG ADDON: CPT

## 2024-02-28 RX ORDER — METHYLPREDNISOLONE 4 MG/1
TABLET ORAL
Qty: 21 EACH | Refills: 0 | Status: SHIPPED | OUTPATIENT
Start: 2024-02-28

## 2024-02-28 RX ORDER — DEXAMETHASONE SODIUM PHOSPHATE 4 MG/ML
8 INJECTION, SOLUTION INTRA-ARTICULAR; INTRALESIONAL; INTRAMUSCULAR; INTRAVENOUS; SOFT TISSUE
Status: COMPLETED | OUTPATIENT
Start: 2024-02-28 | End: 2024-02-28

## 2024-02-28 RX ORDER — METHOCARBAMOL 500 MG/1
500 TABLET, FILM COATED ORAL 3 TIMES DAILY
Qty: 15 TABLET | Refills: 0 | Status: SHIPPED | OUTPATIENT
Start: 2024-02-28 | End: 2024-03-04

## 2024-02-28 RX ORDER — METHOCARBAMOL 500 MG/1
500 TABLET, FILM COATED ORAL
Status: COMPLETED | OUTPATIENT
Start: 2024-02-28 | End: 2024-02-28

## 2024-02-28 RX ORDER — AMOXICILLIN 500 MG/1
500 CAPSULE ORAL EVERY 12 HOURS
Qty: 21 CAPSULE | Refills: 0 | Status: SHIPPED | OUTPATIENT
Start: 2024-02-28 | End: 2024-03-09

## 2024-02-28 RX ADMIN — AMPICILLIN SODIUM AND SULBACTAM SODIUM 3 G: 2; 1 INJECTION, POWDER, FOR SOLUTION INTRAMUSCULAR; INTRAVENOUS at 07:02

## 2024-02-28 RX ADMIN — SODIUM CHLORIDE 500 ML: 9 INJECTION, SOLUTION INTRAVENOUS at 06:02

## 2024-02-28 RX ADMIN — METHOCARBAMOL 500 MG: 500 TABLET ORAL at 05:02

## 2024-02-28 RX ADMIN — IOHEXOL 75 ML: 350 INJECTION, SOLUTION INTRAVENOUS at 08:02

## 2024-02-28 RX ADMIN — DEXAMETHASONE SODIUM PHOSPHATE 8 MG: 4 INJECTION INTRA-ARTICULAR; INTRALESIONAL; INTRAMUSCULAR; INTRAVENOUS; SOFT TISSUE at 06:02

## 2024-02-28 NOTE — FIRST PROVIDER EVALUATION
"Medical screening examination initiated.  I have conducted a focused provider triage encounter, findings are as follows:    Brief history of present illness:  sent in by ENT  Started having L sided neck pain  on Friday, no won the R side.  Went to ENT and told to come for further evaluation. Feels feverish but has not taken temp. No unilateral arm/leg weakness or numbness. No stroke symptoms. Mild sore throat and trouble swallowing today.  No falls or injuries.    ENT is DIPIKA Centeno (at  ENT)      Vitals:    02/28/24 1612   BP: (!) 144/80   BP Location: Right arm   Patient Position: Sitting   Pulse: 89   Resp: 18   Temp: 99 °F (37.2 °C)   TempSrc: Oral   SpO2: 99%   Weight: 59 kg (130 lb)   Height: 5' 4" (1.626 m)       Pertinent physical exam:  holding head forward, pain with ROM. No focal neuro deficits, nontoxic    Brief workup plan:  labs, muscle relaxer, imaging per primary team    Preliminary workup initiated; this workup will be continued and followed by the physician or advanced practice provider that is assigned to the patient when roomed.  "

## 2024-02-29 NOTE — ED NOTES
Patient identifiers verified and correct for Ms Estrada   C/C:  Neck pain SEE NN  APPEARANCE: awake and alert in NAD. PAIN  9/10  SKIN: warm, dry and intact. No breakdown or bruising.  MUSCULOSKELETAL: Patient moving all extremities spontaneously, no obvious swelling or deformities noted. Ambulates independently.  RESPIRATORY: Denies shortness of breath.Respirations unlabored.   CARDIAC: Denies CP, 2+ distal pulses; no peripheral edema  ABDOMEN: S/ND/NT, Denies nausea  : voids spontaneously, denies difficult MS estrada  Neurologic: AAO x 4; follows commands equal strength in all extremities; denies numbness/tingling. Denies dizziness  Denei snew weknaess, reports sore throast

## 2024-02-29 NOTE — ED PROVIDER NOTES
Emergency Department Encounter  Provider Note    Xochitl Weiss  [id number]  2/28/2024    Evaluation:    History Acquisition:     Chief Complaint   Patient presents with    Neck Pain     + reports neck and throat pain last friday, told by ENT to come to the ED,  reports some nuchal rigidity, denies any falls or injury, hard to swallow since today       History of Present Illness:  Xochitl Weiss is a 63 y.o. female who has a past medical history of BRCA negative, Colon polyp, Diverticula of colon, Diverticulitis, Family history of breast cancer, Family history of colon cancer, General anesthetics causing adverse effect in therapeutic use, Genetic testing (2014), GERD (gastroesophageal reflux disease), Hydronephrosis (4/29/2015), Kidney stones, Mild hyperlipidemia, and Prediabetes (8/1/2023).    The patient presents to the ED due to neck pain, sore throat, and painful swallowing.   Patient reports symptoms started last week on Friday with neck pain. Initially was L-sided, and now involving the R side. She states it has gradually gotten worse over the last few days. She reports sore throat and painful swallowing that started in the last few days. No associated fever. No known sick contacts. No prior similar episodes. No associated headache, vision changes, focal weakness/numbness, CP, SOB, or any other concerns.    Additional historians utilized:  none    Prior medical records were reviewed:   11/2023 - Visit for cough, sinusitis. Treated with Rocephin and steroid shot.  10/2023 - visit for contact dermatitis  09/2023 - OB visit for annual exam     The patient's list of active medical history, family/social history, medications, and allergies as documented has been reviewed.     Past Medical History:   Diagnosis Date    BRCA negative     Colon polyp     Diverticula of colon     Diverticulitis     Family history of breast cancer     mother    Family history of colon cancer     2 family members over age 60    General  anesthetics causing adverse effect in therapeutic use     per patient report slow to awaken    Genetic testing     negative Integrated BRACAnalysis    GERD (gastroesophageal reflux disease)     Hydronephrosis 2015    Kidney stones     Mild hyperlipidemia     Prediabetes 2023    Resolved with wt loss and diet changes     Past Surgical History:   Procedure Laterality Date    breast augmentation      BREAST CYST EXCISION Left 2021    BREAST SURGERY      implant removal in 2020     SECTION      x 2    COLONOSCOPY      COLONOSCOPY N/A 3/8/2017    Procedure: COLONOSCOPY;  Surgeon: Torey Macias MD;  Location: [facility] ENDO (4TH FLR);  Service: Endoscopy;  Laterality: N/A;    COLONOSCOPY N/A 2019    Procedure: COLONOSCOPY;  Surgeon: Torey Macias MD;  Location: [facility] ENDO (4TH FLR);  Service: Endoscopy;  Laterality: N/A;    COLONOSCOPY N/A 3/31/2022    Procedure: COLONOSCOPY;  Surgeon: Torey Macias MD;  Location: [facility] ENDO (4TH FLR);  Service: Endoscopy;  Laterality: N/A;   covid test 3/28 @ [facility]; instructions to [facility]    ESOPHAGOGASTRODUODENOSCOPY N/A 10/26/2021    Procedure: EGD (ESOPHAGOGASTRODUODENOSCOPY);  Surgeon: Torey Macias MD;  Location: [facility] ENDO (4TH FLR);  Service: Endoscopy;  Laterality: N/A;  Covid test 10/23 [place], instructions sent to [facility]    EYE SURGERY      blephroplasty    HYSTERECTOMY      AUB    OOPHORECTOMY      TONSILLECTOMY  1979    TRIGGER FINGER RELEASE Right 2017    thumb and 3rd digit    TUBAL LIGATION       Family History   Problem Relation Age of Onset    Colon cancer Father 68    Cancer Father         Colon    Breast cancer Mother 68    Colon cancer Mother 70        twice    Uterine cancer Mother 30    Diabetes Mother     Hypertension Mother     Cancer Mother         Uterine, Breast & Colon    Ovarian cancer Maternal Aunt 60    Lung cancer Maternal Uncle 60    No Known Problems Daughter     No Known Problems Son       labor Neg Hx     Eclampsia Neg Hx     Pancreatic cancer Neg Hx     Kidney cancer Neg Hx     Celiac disease Neg Hx     Cirrhosis Neg Hx     Colon polyps Neg Hx     Crohn's disease Neg Hx     Esophageal cancer Neg Hx     Inflammatory bowel disease Neg Hx     Liver cancer Neg Hx     Liver disease Neg Hx     Rectal cancer Neg Hx     Stomach cancer Neg Hx     Ulcerative colitis Neg Hx      Social History     Socioeconomic History    Marital status:     Number of children: 2   Occupational History    Occupation:       Employer: [employer]     Comment: caretaker for parents   Tobacco Use    Smoking status: Never    Smokeless tobacco: Never   Substance and Sexual Activity    Alcohol use: Yes     Alcohol/week: 4.0 standard drinks of alcohol     Types: 4 Glasses of wine per week     Comment: socially    Drug use: No    Sexual activity: Yes     Partners: Male     Birth control/protection: See Surgical Hx, None     Comment: hysterectomy   Social History Narrative    PAST SURGICAL HISTORY:  Bilateral salpingo-oophorectomy and hysterectomy.         FAMILY HISTORY:  Father with colon cancer at 68.  Mother with colon cancer at     70.  Mother with uterine cancer at 30, mother with breast cancer at 68.      Mother's sister with ovarian cancer at 60, mother's brother with lung cancer     around 60 years of age.  The patient is one of six children.         SOCIAL HISTORY:  She is  on her first marriage.  She has two healthy     kids, age about 32 and 29.  Her son just completed a 5 year Urology fellowship in [place] and moving to [facility] for 2 year fellowship in Urology Oncology. Her  worked for division of the newspaper [email or web address] in advertising.  They have been  for about 37 years.  She is a     nonsmoker, nondrinker.  She worked for [employer].     Social Determinants of Health     Financial Resource Strain: Low Risk  (2023)    Overall Financial Resource  Strain (CARDIA)     Difficulty of Paying Living Expenses: Not hard at all   Food Insecurity: No Food Insecurity (7/29/2023)    Hunger Vital Sign     Worried About Running Out of Food in the Last Year: Never true     Ran Out of Food in the Last Year: Never true   Transportation Needs: No Transportation Needs (7/29/2023)    PRAPARE - Transportation     Lack of Transportation (Medical): No     Lack of Transportation (Non-Medical): No   Physical Activity: Sufficiently Active (7/29/2023)    Exercise Vital Sign     Days of Exercise per Week: 3 days     Minutes of Exercise per Session: 60 min   Stress: No Stress Concern Present (7/29/2023)    [employer] - Occupational Stress Questionnaire     Feeling of Stress : Not at all   Social Connections: Unknown (7/29/2023)    Social Connection and Isolation Panel [NHANES]     Frequency of Communication with Friends and Family: More than three times a week     Frequency of Social Gatherings with Friends and Family: More than three times a week     Active Member of Clubs or Organizations: Yes     Attends Club or Organization Meetings: More than 4 times per year     Marital Status:    Housing Stability: Low Risk  (7/29/2023)    Housing Stability Vital Sign     Unable to Pay for Housing in the Last Year: No     Number of Places Lived in the Last Year: 1     Unstable Housing in the Last Year: No       Medications:  Medication List with Changes/Refills   New Medications    AMOXICILLIN (AMOXIL) 500 MG CAPSULE    Take 1 capsule (500 mg total) by mouth every 12 (twelve) hours. for 10 days    METHOCARBAMOL (ROBAXIN) 500 MG TAB    Take 1 tablet (500 mg total) by mouth 3 (three) times daily. for 5 days    METHYLPREDNISOLONE (MEDROL DOSEPACK) 4 MG TABLET    use as directed   Current Medications    ESTRADIOL (IMVEXXY MAINTENANCE PACK) 10 MCG INST    Place 10 mcg vaginally twice a week.    PANTOPRAZOLE (PROTONIX) 20 MG TABLET    Take 1 tablet (20 mg total) by  mouth before breakfast.       Allergies:  Review of patient's allergies indicates:  No Known Allergies    Review of Systems   Constitutional:  Negative for fever.   Gastrointestinal:  Negative for abdominal pain.   Musculoskeletal:  Positive for myalgias, neck pain and neck stiffness.         Physical Exam:     Initial Vitals [02/28/24 1612]   BP Pulse Resp Temp SpO2   (!) 144/80 89 18 99 °F (37.2 °C) 99 %      MAP       --         Physical Exam    Nursing note and vitals reviewed.  Constitutional: She appears well-developed and well-nourished. She is not diaphoretic. No distress.   HENT:   Head: Normocephalic and atraumatic. Head is without contusion.   Mouth/Throat: Uvula is midline and mucous membranes are normal. Posterior oropharyngeal edema and posterior oropharyngeal erythema present. No oropharyngeal exudate.   Mild L-sided tonsillar swelling. Normal phonation.    Eyes: EOM are normal. Pupils are equal, round, and reactive to light.   Neck: No tracheal deviation present.   Limited neck ROM, diffuse muscular tenderness without crepitus.    Cardiovascular:  Normal rate, regular rhythm, normal heart sounds and intact distal pulses.           Pulmonary/Chest: Breath sounds normal. No stridor. No respiratory distress. She has no wheezes.   Abdominal: Abdomen is soft. Bowel sounds are normal. She exhibits no distension and no mass. There is no abdominal tenderness.   Musculoskeletal:         General: No edema. Normal range of motion.      Cervical back: Rigidity present. Muscular tenderness present. No spinous process tenderness.     Neurological: She is alert and oriented to person, place, and time. She has normal strength. No cranial nerve deficit or sensory deficit.   Skin: Skin is warm and dry. Capillary refill takes less than 2 seconds. No pallor.   Psychiatric: She has a normal mood and affect. Her behavior is normal. Thought content normal.         Differential Diagnoses:   Based on available information  and initial assessment, Differential Diagnosis includes, but is not limited to:  Abdulaziz's angina, epiglottitis, foreign body aspiration, retropharyngeal abscess, peritonsillar abscess, esophageal perforation, mediastinitis, esophagitis, sialolithiasis, parotitis, laryngitis, tracheitis, dental/periapical abscess, TMJ disorder, pneumonia, Streptococcal/bacterial pharyngitis, viral pharyngitis.      ED Management:   Procedures    Orders Placed This Encounter    Group A Strep, Molecular    CT Soft Tissue Neck With Contrast    CBC auto differential    Comprehensive metabolic panel    methocarbamoL tablet 500 mg    ampicillin-sulbactam (UNASYN) 3 g in sodium chloride 0.9 % 100 mL IVPB (MB+)    dexAMETHasone injection 8 mg    sodium chloride 0.9% bolus 500 mL 500 mL    iohexoL (OMNIPAQUE 350) injection 75 mL    methylPREDNISolone (MEDROL DOSEPACK) 4 mg tablet    methocarbamoL (ROBAXIN) 500 MG Tab    amoxicillin (AMOXIL) 500 MG capsule          EKG:       Labs:     Labs Reviewed   GROUP A STREP, MOLECULAR - Abnormal; Notable for the following components:       Result Value    Group A Strep, Molecular Positive (*)     All other components within normal limits   CBC W/ AUTO DIFFERENTIAL - Abnormal; Notable for the following components:    MPV 8.7 (*)     Gran # (ANC) 7.8 (*)     All other components within normal limits   COMPREHENSIVE METABOLIC PANEL     Independent review of the labs ordered include:   See ED course    Imaging:     Imaging Results              CT Soft Tissue Neck With Contrast (Final result)  Result time 02/28/24 21:12:32      Final result by Sheng Pavon MD (02/28/24 21:12:32)                   Impression:      Mild soft tissue prominence near soft tissue calcification ossification in the right side of the posterolateral wall of the nasopharynx and just below the sake in tube.  No defined abscess.  Correlate for tonsillitis.      Electronically signed by: Sheng  Librado  Date:    02/28/2024  Time:    21:12               Narrative:    EXAMINATION:  CT SOFT TISSUE NECK WITH CONTRAST    CLINICAL HISTORY:  Neck abscess, deep tissue;    TECHNIQUE:  Low dose axial images, coronal and sagittal reformations were obtained from the skull base to the lung bases following the intravenous administration of 75 mL of Omnipaque 350.    COMPARISON:  None.    FINDINGS:  Orbits, paranasal sinuses, and skull base: Normal.    Nasopharynx: Mild soft tissue prominence to the right side of the nasopharynx just below the right knee steak in 2..    Suprahyoid neck: Normal oropharynx, oral cavity, parapharyngeal space, and retropharyngeal space.    Infrahyoid neck: Normal larynx, hypopharynx, and supraglottis.    Thyroid: Normal.    Thoracic inlet: Normal lung apices and brachial plexus.    Lymph nodes Normal. No lymphadenopathy.    Vascular structures: Normal.    Lungs: Coarse interstitial markings with small 2 mm ground-glass micronodule in the right lung apex (series 3, image 395).  No consolidation or effusion.    Mediastinum: Normal    Other findings:Calcifications/ossification anterior to the arch of C1 and odontoid with mild edematous changes surrounding it to the right side of the nasopharynx.                                         Medications Given:     Medications   methocarbamoL tablet 500 mg (500 mg Oral Given 2/28/24 1721)   ampicillin-sulbactam (UNASYN) 3 g in sodium chloride 0.9 % 100 mL IVPB (MB+) (0 g Intravenous Stopped 2/28/24 2107)   dexAMETHasone injection 8 mg (8 mg Intravenous Given 2/28/24 1852)   sodium chloride 0.9% bolus 500 mL 500 mL (0 mLs Intravenous Stopped 2/28/24 2021)   iohexoL (OMNIPAQUE 350) injection 75 mL (75 mLs Intravenous Given 2/28/24 2029)        Medical Decision Making:    Additional Consideration:   Additional testing considered during clinical course: none    Social determinants of health considered during development of treatment plan include: poor  access to care    Current co-morbidities considered which impacted clinical decision making: GERD, dysphagia    Case discussed with additional provider: none    ED Course as of 02/28/24 2139 Wed Feb 28, 2024 1808 SpO2: 99 % [SS]   1808 Resp: 18 [SS]   1808 Pulse: 89 [SS]   1808 Temp Source: Oral [SS]   1808 Temp: 99 °F (37.2 °C) [SS]   1808 BP(!): 144/80  Vitals reassuring, normal pulse ox [SS]   1918 CBC auto differential(!)  Unremarkable  [SS]   1919 Comprehensive metabolic panel  Unremarkable  [SS]   1919 Group A Strep, Molecular(!)  Strep+. IV ABX ordered.  CT neck ordered to evaluate for peritonsillar/retropharyngeal abscess.   [SS]   2116 CT Soft Tissue Neck With Contrast  CT independently interpreted: no large peritonsillar abscess, consistent with tonsillitis.  Agree with radiologist interpretation.    [SS]   2117 Patient well-appearing on reassessment, vitals stable. Doubt meningitis given reassuring vitals and exam. Will DC with PO ABX, steroids, muscle relaxant. Recommend PCP/ENT f/u, return precautions given.  [SS]      ED Course User Index  [SS] Moisés Fletcher MD            Medical Decision Making  64 yo F with sore throat and neck pain.  Vitals reassuring.  Labs unremarkable. Strep A +.  CT neck without abscess. Will DC with ABX, steroids, muscle relaxant, PCP/ENT f/u. Return precautions given.     Problems Addressed:  Neck muscle spasm: acute illness or injury  Sore throat: acute illness or injury  Strep pharyngitis: acute illness or injury    Amount and/or Complexity of Data Reviewed  External Data Reviewed: notes.  Labs: ordered. Decision-making details documented in ED Course.  Radiology: ordered and independent interpretation performed. Decision-making details documented in ED Course.    Risk  OTC drugs.  Prescription drug management.  Diagnosis or treatment significantly limited by social determinants of health.        Clinical Impression:       ICD-10-CM ICD-9-CM   1. Strep pharyngitis   J02.0 034.0   2. Neck muscle spasm  M62.838 728.85   3. Sore throat  J02.9 462       Discharge Medications:  Current Discharge Medication List        START taking these medications    Details   amoxicillin (AMOXIL) 500 MG capsule Take 1 capsule (500 mg total) by mouth every 12 (twelve) hours. for 10 days  Qty: 21 capsule, Refills: 0      methocarbamoL (ROBAXIN) 500 MG Tab Take 1 tablet (500 mg total) by mouth 3 (three) times daily. for 5 days  Qty: 15 tablet, Refills: 0      methylPREDNISolone (MEDROL DOSEPACK) 4 mg tablet use as directed  Qty: 21 each, Refills: 0               Follow-up Information       Follow up With Specialties Details Why Contact Info Additional Information    Lily Stoddard MD Internal Medicine Schedule an appointment as soon as possible for a visit   [address] [facility] [address]  [facility] [address]  [phone]       [facility] Otolaryngology Schedule an appointment as soon as possible for a visit   [address] [facility] [id number] [phone] Ear, Nose & Throat Services - Main Building, 4th Floor Please park in [facility] and use Clinic elevator             ED Disposition Condition    Discharge Stable              On re-evaluation, the patient's status has improved.  PCP/ENT follow-up as soon as possible was recommended.    After taking into careful account the patient's history, physical exam findings, as well as empirical and objective data obtained throughout ED workup, I feel no emergent medical condition has been identified. No further evaluation or admission was felt to be required, and the patient is stable for discharge from the ED. The patient and any additional family present were updated with test results, overall clinical impression, and recommended further plan of care, including discharge instructions as provided and outpatient follow-up for continued evaluation and management as needed. All questions were answered. The patient  expressed understanding and agreed with current plan for discharge and follow-up plan of care. Strict ED return precautions were provided, including return/worsening of current symptoms, new symptoms, or any other concerns.       Moisés Fletcher MD  02/28/24 7200

## 2024-02-29 NOTE — DISCHARGE INSTRUCTIONS
Thank you for choosing [facility]!     Our goal in the Emergency Department is to always provide outstanding medical care. You may receive a survey by mail or e-mail in the next week regarding your experience today. We would greatly appreciate you completing and returning the survey. Your feedback provides us with a way to recognize our staff who provide very good care, and it helps us learn how to improve when your experience was below our aspiration of excellence.      It is important to remember that some problems are difficult to diagnose and may not be found during your first visit. Be sure to follow up with your primary care doctor and review any labs/imaging that was performed during your visit with them. If you do not have a primary care doctor, you may contact the one listed on your discharge paperwork, or you may also call the [facility] Appointment Desk at [phone] to schedule an appointment.     All medications may potentially have side effects and it is impossible to predict which medications may give you side effects. If you feel that you are having a negative effect of any medication you should immediately stop taking them and seek medical attention.  Do not drive or make any important decisions for 24 hours if you have received any pain medications, sedatives or mood altering drugs during your ER visit.    We appreciate you trusting us with your medical care. We will be happy to take care of you for all of your future medical needs. You may return to the ER at any time for any new/concerning symptoms, worsening condition, or failure to improve. We hope you feel better soon.     Sincerely,    Moisés Fletcher Jr., MD  Board-Certified Emergency Medicine Physician  [facility]

## 2024-05-02 ENCOUNTER — OFFICE VISIT (OUTPATIENT)
Dept: OPTOMETRY | Facility: CLINIC | Age: 64
End: 2024-05-02
Payer: COMMERCIAL

## 2024-05-02 DIAGNOSIS — Z98.890 S/P LASIK SURGERY OF BOTH EYES: ICD-10-CM

## 2024-05-02 DIAGNOSIS — H52.203 ASTIGMATISM OF BOTH EYES, UNSPECIFIED TYPE: ICD-10-CM

## 2024-05-02 DIAGNOSIS — H47.092 OPTIC NERVE ASYMMETRY, LEFT: ICD-10-CM

## 2024-05-02 DIAGNOSIS — H25.13 NS (NUCLEAR SCLEROSIS), BILATERAL: Primary | ICD-10-CM

## 2024-05-02 DIAGNOSIS — Z83.511 FAMILY HISTORY OF GLAUCOMA: ICD-10-CM

## 2024-05-02 PROCEDURE — 99214 OFFICE O/P EST MOD 30 MIN: CPT | Mod: S$GLB,,, | Performed by: OPTOMETRIST

## 2024-05-02 PROCEDURE — 1159F MED LIST DOCD IN RCRD: CPT | Mod: CPTII,S$GLB,, | Performed by: OPTOMETRIST

## 2024-05-02 PROCEDURE — 1160F RVW MEDS BY RX/DR IN RCRD: CPT | Mod: CPTII,S$GLB,, | Performed by: OPTOMETRIST

## 2024-05-02 PROCEDURE — 99999 PR PBB SHADOW E&M-EST. PATIENT-LVL II: CPT | Mod: PBBFAC,,, | Performed by: OPTOMETRIST

## 2024-05-02 NOTE — PROGRESS NOTES
HPI    OLIVER: 2/17/2022 - Dr. Ferguson     CC: Pt is here today for a routine eye exam. Pt states that her distance   vision is less sharp and her eyes takes time to readjust after reading for   a long period of time.     (-)Dryness  (-)Burning  (-)Itchiness  (-)Tearing  (-)Flashes  (-)Floaters   (-)Photophobia  (-)Eye Pain      Diabetic: no    Contact Lens: no    Eye Meds: no    PD: 61.0    Last edited by Dede Joseph MA on 5/2/2024 12:48 PM.            Assessment /Plan     For exam results, see Encounter Report.        Nuclear sclerosis, bilateral              Mild, not visually significant     S/P LASIK (laser assisted in situ keratomileusis) of both eyes  Presbyopia        Ok to continue with readers PRN     Family history of glaucoma in mother and sister  Optic nerve asymmetry, left    OCT optic nerve today: Borderline thin OU   Repeat next visit      Refractive error              Rx specs SV with AR for driving pRN     RTC 1 year, for DFE

## 2024-06-10 ENCOUNTER — PATIENT MESSAGE (OUTPATIENT)
Dept: INTERNAL MEDICINE | Facility: CLINIC | Age: 64
End: 2024-06-10
Payer: COMMERCIAL

## 2024-06-19 ENCOUNTER — OFFICE VISIT (OUTPATIENT)
Dept: UROLOGY | Facility: CLINIC | Age: 64
End: 2024-06-19
Payer: COMMERCIAL

## 2024-06-19 VITALS
SYSTOLIC BLOOD PRESSURE: 117 MMHG | BODY MASS INDEX: 22.2 KG/M2 | HEART RATE: 78 BPM | HEIGHT: 64 IN | DIASTOLIC BLOOD PRESSURE: 59 MMHG | RESPIRATION RATE: 18 BRPM | WEIGHT: 130.06 LBS

## 2024-06-19 DIAGNOSIS — N95.2 ATROPHIC VAGINITIS: Primary | ICD-10-CM

## 2024-06-19 DIAGNOSIS — N39.0 RECURRENT UTI: ICD-10-CM

## 2024-06-19 LAB
BACTERIA #/AREA URNS AUTO: ABNORMAL /HPF
MICROSCOPIC COMMENT: ABNORMAL
RBC #/AREA URNS AUTO: 14 /HPF (ref 0–4)
SQUAMOUS #/AREA URNS AUTO: 3 /HPF
WBC #/AREA URNS AUTO: 31 /HPF (ref 0–5)

## 2024-06-19 PROCEDURE — 99204 OFFICE O/P NEW MOD 45 MIN: CPT | Mod: S$GLB,,, | Performed by: NURSE PRACTITIONER

## 2024-06-19 PROCEDURE — 3074F SYST BP LT 130 MM HG: CPT | Mod: CPTII,S$GLB,, | Performed by: NURSE PRACTITIONER

## 2024-06-19 PROCEDURE — 1160F RVW MEDS BY RX/DR IN RCRD: CPT | Mod: CPTII,S$GLB,, | Performed by: NURSE PRACTITIONER

## 2024-06-19 PROCEDURE — 1159F MED LIST DOCD IN RCRD: CPT | Mod: CPTII,S$GLB,, | Performed by: NURSE PRACTITIONER

## 2024-06-19 PROCEDURE — 3078F DIAST BP <80 MM HG: CPT | Mod: CPTII,S$GLB,, | Performed by: NURSE PRACTITIONER

## 2024-06-19 PROCEDURE — 87086 URINE CULTURE/COLONY COUNT: CPT | Performed by: NURSE PRACTITIONER

## 2024-06-19 PROCEDURE — 3008F BODY MASS INDEX DOCD: CPT | Mod: CPTII,S$GLB,, | Performed by: NURSE PRACTITIONER

## 2024-06-19 PROCEDURE — 81001 URINALYSIS AUTO W/SCOPE: CPT | Performed by: NURSE PRACTITIONER

## 2024-06-19 NOTE — PROGRESS NOTES
"Subjective:      Xochitl Weiss is a 63 y.o. female who was referred by Dr. Sánchez for evaluation of her frequent UTIs.    The patient has a long history of frequent UTIs that began after the birth of her first child. She has become very attuned to her triggers and has been very good at preventing infections/catching them early over the years.       She presents today reporting suprapubic pressure/slight urgency that began yesterday. She denies dysuria, flank pain and fever/chills. Denies gross hematuria. She is prescribed vaginal estrogen but uses this inconsistently. The timing of her infections are not related to intercourse. She drinks mostly water- adds lemon. Cranberry juice when UTI symptoms begin.     History of uric acid stone several years ago.     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 Mag3 renal scan was normal.     The following portions of the patient's history were reviewed and updated as appropriate: allergies, current medications, past family history, past medical history, past social history, past surgical history and problem list.    Review of Systems  Constitutional: no fever or chills  ENT: no nasal congestion or sore throat  Respiratory: no cough or shortness of breath  Cardiovascular: no chest pain or palpitations  Gastrointestinal: no nausea or vomiting, tolerating diet  Genitourinary: as per HPI  Hematologic/Lymphatic: no easy bruising or lymphadenopathy  Musculoskeletal: no arthralgias or myalgias  Neurological: no seizures or tremors  Behavioral/Psych: no auditory or visual hallucinations     Objective:   Vital Signs:BP (!) 117/59 (BP Location: Left arm, Patient Position: Sitting, BP Method: Small (Automatic))   Pulse 78   Resp 18   Ht 5' 4" (1.626 m)   Wt 59 kg (130 lb 1.1 oz)   BMI 22.33 kg/m²     Physical Exam   General: alert and oriented, no acute distress  Head: normocephalic, atraumatic  Neck: " supple, normal ROM  Respiratory: Symmetric expansion, non-labored breathing  Cardiovascular: regular rate and rhythm  Abdomen: soft, non tender, non distended  Pelvic: deferred  Skin: normal coloration and turgor, no rashes, no suspicious skin lesions noted  Neuro: alert and oriented x3, no gross deficits  Psych: normal judgment and insight, normal mood/affect, and non-anxious    Lab Review   Urinalysis demonstrates : trace RBCs  Lab Results   Component Value Date    WBC 10.93 02/28/2024    HGB 14.4 02/28/2024    HCT 44.2 02/28/2024    HCT 44 09/25/2021    MCV 89 02/28/2024     02/28/2024     Lab Results   Component Value Date    CREATININE 0.9 02/28/2024    BUN 18 02/28/2024       Imaging   None    Assessment:     1. Atrophic vaginitis    2. Recurrent UTI      Plan:   Diagnoses and all orders for this visit:    Atrophic vaginitis    Recurrent UTI  -     Ambulatory referral/consult to Urology  -     Urine culture  -     Urinalysis Microscopic  -     Urine culture; Standing  -     US Retroperitoneal Complete; Future      Plan:  Renal US to r/o hydro and/or stones.  Will send urine culture today and treat as indicated. She prefers to wait for culture results before starting an antibiotic. UA negative today  Micro UA to eval for RBCs  Expressed importance of obtaining culture any time she thinks she has UTI - standing order placed for urine culture  Discussed preventive measures including adequate hydration, d-mannose/probiotic, regular voiding, and voiding after intercourse.  Continue vaginal estrogen twice weekly!!!!  Hold antibiotic prophylaxis/post coital antibiotics for now

## 2024-06-20 ENCOUNTER — PATIENT MESSAGE (OUTPATIENT)
Dept: UROLOGY | Facility: CLINIC | Age: 64
End: 2024-06-20
Payer: COMMERCIAL

## 2024-06-20 DIAGNOSIS — N30.00 ACUTE CYSTITIS WITHOUT HEMATURIA: Primary | ICD-10-CM

## 2024-06-20 LAB
BACTERIA UR CULT: NORMAL
BACTERIA UR CULT: NORMAL

## 2024-06-20 RX ORDER — SULFAMETHOXAZOLE AND TRIMETHOPRIM 800; 160 MG/1; MG/1
1 TABLET ORAL 2 TIMES DAILY
Qty: 14 TABLET | Refills: 0 | Status: SHIPPED | OUTPATIENT
Start: 2024-06-20 | End: 2024-06-27

## 2024-06-26 ENCOUNTER — TELEPHONE (OUTPATIENT)
Dept: UROLOGY | Facility: CLINIC | Age: 64
End: 2024-06-26
Payer: COMMERCIAL

## 2024-06-28 NOTE — TELEPHONE ENCOUNTER
----- Message from Jena Walker sent at 6/28/2024  9:36 AM CDT -----  Regarding: call return  Contact: [phone]  Type:  Patient Returning Call    Who Called:Xochitl  Who Left Message for Patient:Kenyetta  Does the patient know what this is regarding?:Setting up an appt  Would the patient rather a call back or a response via [facility]? yes  Best Call Back Number:[phone]

## 2024-07-02 ENCOUNTER — HOSPITAL ENCOUNTER (OUTPATIENT)
Dept: RADIOLOGY | Facility: HOSPITAL | Age: 64
Discharge: HOME | End: 2024-07-02
Attending: NURSE PRACTITIONER
Payer: COMMERCIAL

## 2024-07-02 DIAGNOSIS — N39.0 RECURRENT UTI: ICD-10-CM

## 2024-07-02 PROCEDURE — 76770 US EXAM ABDO BACK WALL COMP: CPT | Mod: TC

## 2024-07-02 PROCEDURE — 76770 US EXAM ABDO BACK WALL COMP: CPT | Mod: 26,,, | Performed by: STUDENT IN AN ORGANIZED HEALTH CARE EDUCATION/TRAINING PROGRAM

## 2024-07-11 ENCOUNTER — OFFICE VISIT (OUTPATIENT)
Dept: UROLOGY | Facility: CLINIC | Age: 64
End: 2024-07-11
Payer: COMMERCIAL

## 2024-07-11 VITALS
SYSTOLIC BLOOD PRESSURE: 112 MMHG | WEIGHT: 135.56 LBS | HEART RATE: 65 BPM | DIASTOLIC BLOOD PRESSURE: 67 MMHG | BODY MASS INDEX: 23.27 KG/M2

## 2024-07-11 DIAGNOSIS — N28.89 CALIECTASIS DETERMINED BY ULTRASOUND OF KIDNEY: ICD-10-CM

## 2024-07-11 DIAGNOSIS — N95.8 GENITOURINARY SYNDROME OF MENOPAUSE: Primary | ICD-10-CM

## 2024-07-11 DIAGNOSIS — N39.0 RECURRENT UTI: ICD-10-CM

## 2024-07-11 PROCEDURE — 99215 OFFICE O/P EST HI 40 MIN: CPT | Mod: S$GLB,,, | Performed by: UROLOGY

## 2024-07-11 PROCEDURE — 81002 URINALYSIS NONAUTO W/O SCOPE: CPT | Mod: S$GLB,,, | Performed by: UROLOGY

## 2024-07-11 PROCEDURE — 3074F SYST BP LT 130 MM HG: CPT | Mod: CPTII,S$GLB,, | Performed by: UROLOGY

## 2024-07-11 PROCEDURE — 99999 PR PBB SHADOW E&M-EST. PATIENT-LVL III: CPT | Mod: PBBFAC,,, | Performed by: UROLOGY

## 2024-07-11 PROCEDURE — 1159F MED LIST DOCD IN RCRD: CPT | Mod: CPTII,S$GLB,, | Performed by: UROLOGY

## 2024-07-11 PROCEDURE — 51798 US URINE CAPACITY MEASURE: CPT | Mod: S$GLB,,, | Performed by: UROLOGY

## 2024-07-11 PROCEDURE — 3078F DIAST BP <80 MM HG: CPT | Mod: CPTII,S$GLB,, | Performed by: UROLOGY

## 2024-07-11 PROCEDURE — 3008F BODY MASS INDEX DOCD: CPT | Mod: CPTII,S$GLB,, | Performed by: UROLOGY

## 2024-07-11 RX ORDER — LIDOCAINE HYDROCHLORIDE 20 MG/ML
JELLY TOPICAL ONCE
OUTPATIENT
Start: 2024-07-11 | End: 2024-07-11

## 2024-07-11 RX ORDER — DOXYCYCLINE HYCLATE 100 MG
100 TABLET ORAL ONCE
OUTPATIENT
Start: 2024-07-11 | End: 2024-07-11

## 2024-07-11 RX ORDER — ESTRADIOL 0.1 MG/G
1 CREAM VAGINAL
Qty: 42.5 G | Refills: 3 | Status: SHIPPED | OUTPATIENT
Start: 2024-07-12 | End: 2025-07-12

## 2024-07-11 NOTE — PATIENT INSTRUCTIONS
If the Estrace cream is more than $25, please register on [facility] [employer] [facility] site and message me.  I will then send a prescription.  You will need to get back on the website to complete the transaction and they will mail you the prescription.     Per [facility] representative, patients must sign up on the website: [employer] and click on Rx manager to pay for medications and then it will ship.    It is ideal for the patient to signup prior to prescription being sent. If patients are not signed up prior to medication being sent, no notifications will go out because the email is not on file.

## 2024-07-11 NOTE — PROGRESS NOTES
CHIEF COMPLAINT:    Mrs. Weiss is a 63 y.o. female presenting for a consultation on recurrent UTI.    NOTE:  This is Franco Weiss's mother.     PRESENTING ILLNESS:    Xochitl Weiss is a 63 y.o. female who presents with a history of recurrent UTI.  She had a remote history of pyelonephritis occurring on the left side.  The first was after her child was born and she attributed it to being a young mom, possibly not taking in enough fluids.  She had another episode years later.  Most recently, she has had several episodes.  The last, showed no growth in spite of having symptoms.   She saw Quin Vaughn who ordered a renal ultrasound and it showed bilateral caliectasis.  In the past, she was noted to have a smaller left kidney with caliectasis but not on both sides.  She has never had symptoms on the right kidney.  There is a remote history of uric acid stones.  Was managed medically.     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      Onset of symptoms:  most recently this year    Culture Proven (yes/no):  2023 E coli R ampicillin, cipro, levaquin and tobramycin, I amp/sulbactam, sensitive to the remaining antibiotics tested against.   2024 multiorganisms  2023 no growth    Symptoms:     Risk factors:     Bowel movements:   soft, regular   Menopausal status:    Using Vaginal Estrogen:  on Imvexxy, twice a week   Sexually active:  yes, has some vaginal dryness   fluid intake:  drinks copiously.  32 oz x 2 water, one 12 oz cup of coffee.   Pad use:  none, but has some urgency    , hysterectomy for menorrhagia, (there is a family history for cancer colon, ovarian, breast), (patient was negative for DNA testing)    REVIEW OF SYSTEMS:    Review of Systems   Constitutional: Negative.    HENT: Negative.     Eyes: Negative.    Respiratory: Negative.     Cardiovascular: Negative.    Gastrointestinal: Negative.    Genitourinary: Negative.    Musculoskeletal: Negative.     Skin: Negative.    Neurological: Negative.    Endo/Heme/Allergies: Negative.    Psychiatric/Behavioral: Negative.         PATIENT HISTORY:    Past Medical History:   Diagnosis Date    BRCA negative     Colon polyp     Diverticula of colon     Diverticulitis     Family history of breast cancer     mother    Family history of colon cancer     2 family members over age 60    General anesthetics causing adverse effect in therapeutic use     per patient report slow to awaken    Genetic testing     negative Integrated [employer]AC[employer]    GERD (gastroesophageal reflux disease)     Hydronephrosis 2015    Kidney stones     Mild hyperlipidemia     Prediabetes 2023    Resolved with wt loss and diet changes       Past Surgical History:   Procedure Laterality Date    breast augmentation      BREAST CYST EXCISION Left 2021    BREAST SURGERY      implant removal in 2020     SECTION      x 2    COLONOSCOPY      COLONOSCOPY N/A 3/8/2017    Procedure: COLONOSCOPY;  Surgeon: Torey Macias MD;  Location: [facility] ENDO (4TH FLR);  Service: Endoscopy;  Laterality: N/A;    COLONOSCOPY N/A 2019    Procedure: COLONOSCOPY;  Surgeon: Torey Macias MD;  Location: [facility] (4TH FLR);  Service: Endoscopy;  Laterality: N/A;    COLONOSCOPY N/A 3/31/2022    Procedure: COLONOSCOPY;  Surgeon: Torey Macias MD;  Location: [facility] (4TH FLR);  Service: Endoscopy;  Laterality: N/A;   covid test 3/28 @ [facility]; instructions to [facility]    ESOPHAGOGASTRODUODENOSCOPY N/A 10/26/2021    Procedure: EGD (ESOPHAGOGASTRODUODENOSCOPY);  Surgeon: oTrey Macias MD;  Location: [facility] (4TH FLR);  Service: Endoscopy;  Laterality: N/A;  Covid test 10/23 [facility], instructions sent to [email or web address]    EYE SURGERY      blephroplasty    HYSTERECTOMY      AUB    OOPHORECTOMY      TONSILLECTOMY  1979    TRIGGER FINGER RELEASE Right 2017    thumb and 3rd digit    TUBAL LIGATION         Family History   Problem  Relation Name Age of Onset    Colon cancer Father Col. Pal Ferguson, Sr 68    Cancer Father Col. Pal RHYS Marty, Sr         Colon    Breast cancer Mother Alethea Ferguson 68    Colon cancer Mother Alethea Ferguson 70        twice    Uterine cancer Mother Alethea Ferguson 30    Diabetes Mother Alethea Ferguson     Hypertension Mother Alethea Ferguson     Cancer Mother Alethea Ferguson         Uterine, Breast & Colon    Ovarian cancer Maternal Aunt  60    Lung cancer Maternal Uncle  60     Social History     Socioeconomic History    Marital status:     Number of children: 2   Occupational History    Occupation:       Employer: [employer]     Comment: caretaker for parents   Tobacco Use    Smoking status: Never    Smokeless tobacco: Never   Substance and Sexual Activity    Alcohol use: Yes     Alcohol/week: 4.0 standard drinks of alcohol     Types: 4 Glasses of wine per week     Comment: socially    Drug use: No    Sexual activity: Yes     Partners: Male     Birth control/protection: See Surgical Hx, None     Comment: hysterectomy   Social History Narrative    PAST SURGICAL HISTORY:  Bilateral salpingo-oophorectomy and hysterectomy.         FAMILY HISTORY:  Father with colon cancer at 68.  Mother with colon cancer at     70.  Mother with uterine cancer at 30, mother with breast cancer at 68.      Mother's sister with ovarian cancer at 60, mother's brother with lung cancer     around 60 years of age.  The patient is one of six children.         SOCIAL HISTORY:  She is  on her first marriage.  She has two healthy     kids, age about 32 and 29.  Her son just completed a 5 year Urology fellowship in [place] and moving to [facility] for 2 year fellowship in Urology Oncology. Her  worked for division of the newspaper [email or web address] in advertising.  They have been  for about 37 years.  She is a     nonsmoker, nondrinker.  She worked for [employer].     Social Determinants of Health     Financial Resource Strain: Low Risk  (4/29/2024)    Overall Financial Resource Strain (CARDIA)     Difficulty of Paying Living Expenses: Not hard at all   Food Insecurity: No Food Insecurity (4/29/2024)    Hunger Vital Sign     Worried About Running Out of Food in the Last Year: Never true     Ran Out of Food in the Last Year: Never true   Transportation Needs: No Transportation Needs (4/29/2024)    PRAPARE - Transportation     Lack of Transportation (Medical): No     Lack of Transportation (Non-Medical): No   Physical Activity: Insufficiently Active (4/29/2024)    Exercise Vital Sign     Days of Exercise per Week: 3 days     Minutes of Exercise per Session: 30 min   Stress: No Stress Concern Present (4/29/2024)    [employer] - Occupational Stress Questionnaire     Feeling of Stress : Not at all   Housing Stability: Low Risk  (7/29/2023)    Housing Stability Vital Sign     Unable to Pay for Housing in the Last Year: No     Number of Places Lived in the Last Year: 1     Unstable Housing in the Last Year: No       Allergies:  Patient has no known allergies.    Medications:  Outpatient Encounter Medications as of 7/11/2024   Medication Sig Dispense Refill    estradioL (IMVEXXY MAINTENANCE PACK) 10 mcg Inst Place 10 mcg vaginally twice a week. 8 each 11    methylPREDNISolone (MEDROL DOSEPACK) 4 mg tablet use as directed 21 each 0    pantoprazole (PROTONIX) 20 MG tablet Take 1 tablet (20 mg total) by mouth before breakfast. 90 tablet 3     No facility-administered encounter medications on file as of 7/11/2024.         PHYSICAL EXAMINATION:    The patient generally appears in good health, is appropriately interactive, and is in no apparent distress.    Skin: No lesions.    Mental: Cooperative with normal affect.    Neuro: Grossly intact.    HEENT: Normal. No evidence of lymphadenopathy.    Chest:  normal inspiratory effort.    Abdomen: Soft,  non-tender. No masses or organomegaly. Bladder is not palpable. No evidence of flank discomfort. No evidence of inguinal hernia.    Extremities: No clubbing, cyanosis, or edema    NOTE:  the exam was carried out with a nurse chaperone present  Normal external female genitalia  Grade II urogenital atrophy  Urethral meatus is normal  Urethra and bladder are nontender to bimanual exam  Well supported anteriorly and posteriorly   Uterus and cervix are surgically absent  No adnexal masses  PVR by bladder scan was 2 ml     LABS:    Lab Results   Component Value Date    BUN 18 02/28/2024    CREATININE 0.9 02/28/2024     Renal ultrasound 7/2/2024 had bilateral caliectasis.  This was previously noted only on the left side, per the patient.     UA 1.005, pH 7, otherwise, negative.     IMPRESSION:    Caliectasis seen on ultrasound  Genitourinary syndrome of menopause (GSM)  Recurrent UTI    PLAN:    1.  BMP, CT urogram to evaluate the caliectasis  2.  Cystoscopy  3.  Estrace cream instead of Imvexxy.  She will let me know if the Estrace is > $25. I will then send to [facility].    I spent 40 minutes with the patient of which more than half was spent in direct consultation with the patient in regards to our treatment and plan.    Copy to: Lily Stoddard MD

## 2024-07-12 ENCOUNTER — LAB VISIT (OUTPATIENT)
Dept: LAB | Facility: HOSPITAL | Age: 64
End: 2024-07-12
Attending: INTERNAL MEDICINE
Payer: COMMERCIAL

## 2024-07-12 DIAGNOSIS — Z00.00 ANNUAL PHYSICAL EXAM: ICD-10-CM

## 2024-07-12 LAB
ALBUMIN SERPL BCP-MCNC: 4 G/DL (ref 3.5–5.2)
ALP SERPL-CCNC: 74 U/L (ref 55–135)
ALT SERPL W/O P-5'-P-CCNC: 14 U/L (ref 10–44)
ANION GAP SERPL CALC-SCNC: 10 MMOL/L (ref 8–16)
AST SERPL-CCNC: 22 U/L (ref 10–40)
BASOPHILS # BLD AUTO: 0.03 K/UL (ref 0–0.2)
BASOPHILS NFR BLD: 0.6 % (ref 0–1.9)
BILIRUB SERPL-MCNC: 0.4 MG/DL (ref 0.1–1)
BUN SERPL-MCNC: 15 MG/DL (ref 8–23)
CALCIUM SERPL-MCNC: 9.8 MG/DL (ref 8.7–10.5)
CHLORIDE SERPL-SCNC: 107 MMOL/L (ref 95–110)
CO2 SERPL-SCNC: 24 MMOL/L (ref 23–29)
CREAT SERPL-MCNC: 0.9 MG/DL (ref 0.5–1.4)
DIFFERENTIAL METHOD BLD: NORMAL
EOSINOPHIL # BLD AUTO: 0.2 K/UL (ref 0–0.5)
EOSINOPHIL NFR BLD: 2.9 % (ref 0–8)
ERYTHROCYTE [DISTWIDTH] IN BLOOD BY AUTOMATED COUNT: 12.4 % (ref 11.5–14.5)
EST. GFR  (NO RACE VARIABLE): >60 ML/MIN/1.73 M^2
ESTIMATED AVG GLUCOSE: 117 MG/DL (ref 68–131)
GLUCOSE SERPL-MCNC: 96 MG/DL (ref 70–110)
HBA1C MFR BLD: 5.7 % (ref 4–5.6)
HCT VFR BLD AUTO: 44.7 % (ref 37–48.5)
HGB BLD-MCNC: 14.4 G/DL (ref 12–16)
IMM GRANULOCYTES # BLD AUTO: 0.01 K/UL (ref 0–0.04)
IMM GRANULOCYTES NFR BLD AUTO: 0.2 % (ref 0–0.5)
LYMPHOCYTES # BLD AUTO: 2.1 K/UL (ref 1–4.8)
LYMPHOCYTES NFR BLD: 38.3 % (ref 18–48)
MAGNESIUM SERPL-MCNC: 2.1 MG/DL (ref 1.6–2.6)
MCH RBC QN AUTO: 29.1 PG (ref 27–31)
MCHC RBC AUTO-ENTMCNC: 32.2 G/DL (ref 32–36)
MCV RBC AUTO: 90 FL (ref 82–98)
MONOCYTES # BLD AUTO: 0.4 K/UL (ref 0.3–1)
MONOCYTES NFR BLD: 7.9 % (ref 4–15)
NEUTROPHILS # BLD AUTO: 2.7 K/UL (ref 1.8–7.7)
NEUTROPHILS NFR BLD: 50.1 % (ref 38–73)
NRBC BLD-RTO: 0 /100 WBC
PLATELET # BLD AUTO: 266 K/UL (ref 150–450)
PMV BLD AUTO: 9.4 FL (ref 9.2–12.9)
POTASSIUM SERPL-SCNC: 4.8 MMOL/L (ref 3.5–5.1)
PROT SERPL-MCNC: 7.1 G/DL (ref 6–8.4)
RBC # BLD AUTO: 4.95 M/UL (ref 4–5.4)
SODIUM SERPL-SCNC: 141 MMOL/L (ref 136–145)
WBC # BLD AUTO: 5.45 K/UL (ref 3.9–12.7)

## 2024-07-12 PROCEDURE — 82306 VITAMIN D 25 HYDROXY: CPT | Performed by: INTERNAL MEDICINE

## 2024-07-12 PROCEDURE — 83036 HEMOGLOBIN GLYCOSYLATED A1C: CPT | Performed by: INTERNAL MEDICINE

## 2024-07-12 PROCEDURE — 84443 ASSAY THYROID STIM HORMONE: CPT | Performed by: INTERNAL MEDICINE

## 2024-07-12 PROCEDURE — 84439 ASSAY OF FREE THYROXINE: CPT | Performed by: INTERNAL MEDICINE

## 2024-07-12 PROCEDURE — 82607 VITAMIN B-12: CPT | Performed by: INTERNAL MEDICINE

## 2024-07-12 PROCEDURE — 36415 COLL VENOUS BLD VENIPUNCTURE: CPT | Mod: PO | Performed by: INTERNAL MEDICINE

## 2024-07-12 PROCEDURE — 83735 ASSAY OF MAGNESIUM: CPT | Performed by: INTERNAL MEDICINE

## 2024-07-12 PROCEDURE — 85025 COMPLETE CBC W/AUTO DIFF WBC: CPT | Performed by: INTERNAL MEDICINE

## 2024-07-12 PROCEDURE — 80053 COMPREHEN METABOLIC PANEL: CPT | Performed by: INTERNAL MEDICINE

## 2024-07-13 LAB
25(OH)D3+25(OH)D2 SERPL-MCNC: 76 NG/ML (ref 30–96)
T4 FREE SERPL-MCNC: 1 NG/DL (ref 0.71–1.51)
TSH SERPL DL<=0.005 MIU/L-ACNC: 4.23 UIU/ML (ref 0.4–4)
VIT B12 SERPL-MCNC: 706 PG/ML (ref 210–950)

## 2024-07-16 ENCOUNTER — HOSPITAL ENCOUNTER (OUTPATIENT)
Dept: RADIOLOGY | Facility: HOSPITAL | Age: 64
Discharge: HOME | End: 2024-07-16
Attending: UROLOGY
Payer: COMMERCIAL

## 2024-07-16 DIAGNOSIS — N28.89 CALIECTASIS DETERMINED BY ULTRASOUND OF KIDNEY: ICD-10-CM

## 2024-07-16 PROCEDURE — 74178 CT ABD&PLV WO CNTR FLWD CNTR: CPT | Mod: 26,,, | Performed by: RADIOLOGY

## 2024-07-16 PROCEDURE — 74178 CT ABD&PLV WO CNTR FLWD CNTR: CPT | Mod: TC

## 2024-07-16 PROCEDURE — 25500020 PHARM REV CODE 255: Performed by: UROLOGY

## 2024-07-16 RX ADMIN — IOHEXOL 100 ML: 350 INJECTION, SOLUTION INTRAVENOUS at 06:07

## 2024-07-25 ENCOUNTER — OFFICE VISIT (OUTPATIENT)
Dept: HEMATOLOGY/ONCOLOGY | Facility: CLINIC | Age: 64
End: 2024-07-25
Payer: COMMERCIAL

## 2024-07-25 VITALS
DIASTOLIC BLOOD PRESSURE: 70 MMHG | TEMPERATURE: 98 F | BODY MASS INDEX: 23.56 KG/M2 | WEIGHT: 138 LBS | SYSTOLIC BLOOD PRESSURE: 116 MMHG | RESPIRATION RATE: 12 BRPM | HEIGHT: 64 IN | OXYGEN SATURATION: 99 % | HEART RATE: 77 BPM

## 2024-07-25 DIAGNOSIS — R92.30 DENSE BREAST TISSUE ON MAMMOGRAM, UNSPECIFIED TYPE: ICD-10-CM

## 2024-07-25 DIAGNOSIS — Z91.89 AT HIGH RISK FOR BREAST CANCER: Primary | ICD-10-CM

## 2024-07-25 DIAGNOSIS — Z80.3 FAMILY HISTORY OF BREAST CANCER IN FIRST DEGREE RELATIVE: ICD-10-CM

## 2024-07-25 PROCEDURE — 99214 OFFICE O/P EST MOD 30 MIN: CPT | Mod: S$GLB,,, | Performed by: NURSE PRACTITIONER

## 2024-07-25 PROCEDURE — 99999 PR PBB SHADOW E&M-EST. PATIENT-LVL III: CPT | Mod: PBBFAC,,, | Performed by: NURSE PRACTITIONER

## 2024-07-25 PROCEDURE — 3044F HG A1C LEVEL LT 7.0%: CPT | Mod: CPTII,S$GLB,, | Performed by: NURSE PRACTITIONER

## 2024-07-25 PROCEDURE — 3078F DIAST BP <80 MM HG: CPT | Mod: CPTII,S$GLB,, | Performed by: NURSE PRACTITIONER

## 2024-07-25 PROCEDURE — 3074F SYST BP LT 130 MM HG: CPT | Mod: CPTII,S$GLB,, | Performed by: NURSE PRACTITIONER

## 2024-07-25 PROCEDURE — 1159F MED LIST DOCD IN RCRD: CPT | Mod: CPTII,S$GLB,, | Performed by: NURSE PRACTITIONER

## 2024-07-25 PROCEDURE — 3008F BODY MASS INDEX DOCD: CPT | Mod: CPTII,S$GLB,, | Performed by: NURSE PRACTITIONER

## 2024-07-25 PROCEDURE — G2211 COMPLEX E/M VISIT ADD ON: HCPCS | Mod: S$GLB,,, | Performed by: NURSE PRACTITIONER

## 2024-07-25 NOTE — PROGRESS NOTES
"  Reason For Consultation:   Increased lifetime risk of breast cancer    Referring Provider:   No referring provider defined for this encounter.    Records Obtained: Records of the patients history including those obtained from the referring provider were reviewed and summarized in detail.    HPI:   Xochitl Weiss presents for a follow up for increased risk of breast cancer. She is postmenopausal. She presented for screening mammogram on 22 which was benign but revealed a Tyrer-Cuzick score of 14.87%.  She has been seen in the high risk breast clinic in the past, last in .  Has dense breast tissue and her mother had breast cancer.  MRI done 23 and results are still pending    Today, Feels good and no complaints.   No breast concerns.  Had breast implants removed in 2021    High Risk Breast cancer specific history:  - Age: 63 y.o.   - Height:  5'4"  - Weight: 138 lbs  - Breast density per BI-RADS:  Heterogeneously dense  - Age at menarche:  15  - Number of pregnancies: ; age of first live birth: 29   - History of breast feeding: Yes - about 8 weeks x2.   - Age at menopause, if applicable:  34, total hysterectomy  -Uterus and ovaries intact: No: hysterectomy at 34  - HRT: Yes - currently using estradiol inserts, took oral hormone replacement therapy in the past, stopped oral hrt more than 5 years ago    - Genetic testing: Yes - brca 1/2 testing back in , negative, also negative for granger mutation  - Personal history of cancer: No  - Previous chest radiation exposure between ages 10-30 years old: No  - Personal history of breast biopsy: Yes - 2, one was a lipoma, both benign, no atypia  - Ashkenazi Holiness Inheritance: No  - Family history of cancer:  Mother: breast cancer, diagnosed at 68,  at 81, also had colon cancer, and uterine cancer which was diagnosed under 40  Maternal aunt: ovarian cancer  Maternal uncle: lung cancer  Father: colon cancer  Niece: thyroid " cancer  Brothers: skin cancer    Social History:  Tobacco use:  none  Alcohol use:  socially, about 2 per week  Exercise regimen: three times per week, plays gold twice weekly and rides stationary bike  Employment: retired    SEE CALCULATED RISK BELOW.     Past Medical   Past Medical History:   Diagnosis Date    BRCA negative     Colon polyp     Diverticula of colon     Diverticulitis     Family history of breast cancer     mother    Family history of colon cancer     2 family members over age 60    General anesthetics causing adverse effect in therapeutic use     per patient report slow to awaken    Genetic testing 2014    negative Integrated BRACAnalysis    GERD (gastroesophageal reflux disease)     Hydronephrosis 4/29/2015    Kidney stones     Mild hyperlipidemia     Prediabetes 8/1/2023    Resolved with wt loss and diet changes     Patient Active Problem List   Diagnosis    Encounter for long-term (current) use of other medications    Family history of colon cancer    GERD (gastroesophageal reflux disease)    Family history of breast cancer    Premature menopause on HRT    Hemorrhoids, internal    History of diverticulitis    Food impaction of esophagus    Esophageal foreign body    Dysphagia    At high risk for breast cancer    Family history of colon cancer in father    Reflux esophagitis    History of nephrolithiasis     Social History   Social History     Tobacco Use    Smoking status: Never    Smokeless tobacco: Never   Substance Use Topics    Alcohol use: Yes     Alcohol/week: 4.0 standard drinks of alcohol     Types: 4 Glasses of wine per week     Comment: socially    Drug use: No     Family History  Family History   Problem Relation Name Age of Onset    Breast cancer Mother Alethea Ferguson 68    Colon cancer Mother Alethea Ferguson 70        twice    Uterine cancer Mother Alethea Ferguson 30    Diabetes Mother Alethea Ferguson     Hypertension Mother Alethea Ferguson     Cancer  Mother Alethea Ferguson         Uterine, Breast & Colon    Colon cancer Father Col. [place] RHYS Ferguson, Sr 68    Cancer Father Col. Pal Ferguson, Sr         Colon    Ovarian cancer Maternal Aunt  65    Lung cancer Maternal Uncle      No Known Problems Daughter      No Known Problems Son       labor Neg Hx      Eclampsia Neg Hx      Pancreatic cancer Neg Hx      Kidney cancer Neg Hx      Celiac disease Neg Hx      Cirrhosis Neg Hx      Colon polyps Neg Hx      Crohn's disease Neg Hx      Esophageal cancer Neg Hx      Inflammatory bowel disease Neg Hx      Liver cancer Neg Hx      Liver disease Neg Hx      Rectal cancer Neg Hx      Stomach cancer Neg Hx      Ulcerative colitis Neg Hx       Medications    Current Outpatient Medications:     estradioL (ESTRACE) 0.01 % (0.1 mg/gram) vaginal cream, Place 1 g vaginally 3 (three) times a week., Disp: 42.5 g, Rfl: 3    pantoprazole (PROTONIX) 20 MG tablet, Take 1 tablet (20 mg total) by mouth before breakfast., Disp: 90 tablet, Rfl: 3  Allergies  Review of patient's allergies indicates:  No Known Allergies    Review of Systems       See above   Review of Systems  Review of Systems   Constitutional:  Negative for appetite change and unexpected weight change.   HENT:  Negative for mouth sores.    Eyes:  Negative for visual disturbance.   Respiratory:  Negative for cough and shortness of breath.    Cardiovascular:  Negative for chest pain.   Gastrointestinal:  Negative for abdominal pain and diarrhea.   Genitourinary:  Negative for frequency.   Musculoskeletal:  Negative for back pain.   Skin:  Negative for rash.   Neurological:  Negative for headaches.   Hematological:  Negative for adenopathy.   Psychiatric/Behavioral:  The patient is not nervous/anxious.      All other systems reviewed and are negative.    Objective:      Vitals:   Vitals:    24 0938   BP: 116/70   BP Location: Left arm   Patient Position: Sitting   BP Method: Large (Automatic)   Pulse: 77  "  Resp: 12   Temp: 97.6 °F (36.4 °C)   TempSrc: Oral   SpO2: 99%   Weight: 62.6 kg (138 lb 0.1 oz)   Height: 5' 4" (1.626 m)         BMI: Body mass index is 23.69 kg/m².   Body surface area is 1.68 meters squared.    Physical Exam  Constitutional:       General: She is not in acute distress.     Appearance: She is well-developed. She is not diaphoretic.   HENT:      Head: Normocephalic and atraumatic.   Eyes:      General: No scleral icterus.     Conjunctiva/sclera: Conjunctivae normal.   Cardiovascular:      Rate and Rhythm: Normal rate and regular rhythm.      Pulses: Normal pulses.      Heart sounds: Normal heart sounds.   Pulmonary:      Effort: Pulmonary effort is normal. No respiratory distress.      Breath sounds: Normal breath sounds.   Chest:      Comments: No skin changes or breast masses noted bilaterally, no  lymphadenopathy noted    Abdominal:      General: There is no distension.   Musculoskeletal:         General: Normal range of motion.      Cervical back: Normal range of motion and neck supple.   Skin:     General: Skin is warm and dry.   Neurological:      Mental Status: She is alert and oriented to person, place, and time.   Psychiatric:         Behavior: Behavior normal.         Laboratory Data: reviewed most recent   Imaging: reviewed most recent    Assessment:     1. At high risk for breast cancer    2. Family history of breast cancer in first degree relative    3. Dense breast tissue on mammogram, unspecified type            1. Increased risk of breast cancer   * Tyrer-Cuzick (TC) lifetime risk of 13.64% (recalculated today) We discussed that TC score will categorize your lifetime risk of being diagnosed with breast cancer. Categories are as such: Average risk <15%, Intermediate risk 15-19%, and High risk > or = to 20%.    *The Mahsa Model for Breast Cancer risk: She has a 4.4% (recalculated today) 5-year breast cancer risk (Compared with 1.7% for the average 61 y.o. woman) and 20.6% Lifetime " breast cancer risk (Compared with 9.3% for the average 61 y.o. woman). A woman's risk is considered low if her five-year risk of developing breast cancer is less than 1.6%; it is considered high if she scores above 1.66%. (All women who are over 60 have a score of at least 1.66 and are considered high risk, based on the Mahsa Model.)    Recommendation for women with elevated TC score:     Recommendation for women with elevated Mahsa Model.         Risk factors are categorized into 2 groups: Modifiable and Non-modifiable. Modifiable risk factors include use of hormones, alcohol, smoking, diet and exercise. Non-modifiable risk factors include breast density, genetics, chest radiation, previous pregnancies, age of first period, and age of menopause.      * For women at high risk for breast cancer, endocrine therapy can reduce the risk of invasive and/or in situ breast cancers. (tamoxifen for premenopausal or postmenopausal women and raloxifene or exemestane for postmenopausal women).     * Discussed that Tamoxifen 20 mg daily for 5 years has shown to reduce risk of breast cancer by 49% and women with ADH/ALH or LCIS have an even more significant reduction of risk of 86%. Aromatase inhibitors for 5 years have also shown risk reduction in terms of 50-60%. At current, there is not adequate data to recommend longer courses of therapy more than 5 years for risk reduction.      * Tamoxifen has limited data in BRCA 1/2 mutation carriers but limited retrospective data is suggestive of benefit. There is retrospective data that aromatase inhibitors can reduce the risk of contralateral ER positive breast cancers in BRCA 1/2 patients who were taking AIs as adjuvant therapy. There is no data for raloxifen in the population.      * Reviewed risks of Tamoxifen side effects include hot flashes, invasive endometrial cancer in women > 49 years of age (2.3/1000 compared to 0.9/1000), cataracts, increased risk of pulmonary embolism among  others.     * Reviewed Lifestyle modifications which have shown benefit:  Limit alcohol consumption to less than 1 drink per day (1 ounce liquor, 6 oz wine, 8 oz beer)  Avoid smoking.  Exercise at least 150 minutes per week of moderate intensity aerobic activity or at least 75 minutes of vigorous activity. Exercise can lower the relative risk of breast cancer by ~18-20%.  Maintain healthy weight and avoid post-menopausal weight gain. Avoid processed foods and eat more lean proteins, fruits and vegetables.                  * Discussed available resources including genetic counseling, nutrition, weight management.    * Reviewed future screening:   Semiannual (every 6 months) CBE (clinical breast exam).   Annual Breast MRI alternating with an annual MMG. if TC 20% or greater.                Discussed with patient that there are several models available for stratifying breast cancer risk, and Monica-Joshua is presently the model utilized by [facility] and is a model recommended per current NCCN guidelines.      The Mahsa Model for Breast Cancer risk estimates the absolute 5 year risk and lifetime risk of developing breast cancer. Family history includes only first degree relatives with breast cancer, which is not enough information to estimate the risk of a patient having BRCA mutation. It also underestimates the cancer risk for patients with extensive family history. The Mahsa Model is a good predictor of risk for populations but not for individuals. It adjusts risk for race/ethnicity. It may underestimate breast cancer risk in patients with atypical hyperplasia and strong family history. The Mahsa Model was NOT designed to estimate risk for: Women with a prior diagnosis of breast cancer, lobular carcinoma in situ (LCIS), or ductal carcinoma in situ (DCIS);  Women who have received previous radiation therapy to the chest for treatment of Hodgkin lymphoma;  Women with gene mutations in BRCA1 or BRCA2, or those  who are known to have certain genetic syndromes that increase risk for breast cancer; Women of age <35 or >85.          Plan:     Patient has opted out of chemoprevention but will call in the future if she wishes to pursue.   Patient elects to proceed with alternating annual mammogram and annual breast MRI along with semiannual CBEs.  Even with TC score being 13,64%, which is average risk, but her 10 year risk score is 6.9%, saima is high risk and her lyndsay model score is high risk. We will continue to alternate mammograms and MRIs  Patient will follow up with PCP or GYN for one semiannual CBE along with annual mammogram in September 2024 and will RTC here for one semiannual CBE along with annual breast MRI in March 2025.  Lifestyle modifications as detailed above.   5.   Encouraged breast awareness, including monthly breast self-exams.     RTC in 8 months to see me either at [facility] or on 3rd floor..     Questions were encouraged and answered to patient's satisfaction, and patient verbalized understanding of information and agreement with the plan. Advised patient to RTC with any interval changes or concerns.      Patient is in agreement with the proposed treatment plan. All questions were answered to the patient's satisfaction. Patient knows to call clinic for any new or worsening symptoms and if anything is needed before the next clinic visit.    JENI Patrick      Med Onc Chart Routing      Follow up with physician    Follow up with GERARD . March 2025   Infusion scheduling note    Injection scheduling note    Labs    Imaging MRI   MRI march 2025, one week before provider visit   Pharmacy appointment    Other referrals                JENI Patrick  Hematology & Medical Oncology   [address] [place] [id number]  ph. [phone]  Fax. [phone]    Total time spent with the patient: 30 minutes, 20 minutes of face to face consultation and 10 minutes of chart review and coordination of  care, on the day of the visit. This includes face to face time and non-face to face time preparing to see the patient (eg, review of tests), obtaining and/or reviewing separately obtained history, documenting clinical information in the electronic or other health record, independently interpreting resultsand communicating results to the patient/family/caregiver, or care coordination.

## 2024-07-30 ENCOUNTER — TELEPHONE (OUTPATIENT)
Dept: HEMATOLOGY/ONCOLOGY | Facility: CLINIC | Age: 64
End: 2024-07-30
Payer: COMMERCIAL

## 2024-08-01 ENCOUNTER — PATIENT MESSAGE (OUTPATIENT)
Dept: INTERNAL MEDICINE | Facility: CLINIC | Age: 64
End: 2024-08-01
Payer: COMMERCIAL

## 2024-08-01 ENCOUNTER — OFFICE VISIT (OUTPATIENT)
Dept: INTERNAL MEDICINE | Facility: CLINIC | Age: 64
End: 2024-08-01
Payer: COMMERCIAL

## 2024-08-01 VITALS
OXYGEN SATURATION: 95 % | BODY MASS INDEX: 23.41 KG/M2 | HEIGHT: 64 IN | SYSTOLIC BLOOD PRESSURE: 130 MMHG | HEART RATE: 83 BPM | DIASTOLIC BLOOD PRESSURE: 72 MMHG | WEIGHT: 137.13 LBS

## 2024-08-01 DIAGNOSIS — K57.92 DIVERTICULITIS: Primary | ICD-10-CM

## 2024-08-01 PROCEDURE — 99999 PR PBB SHADOW E&M-EST. PATIENT-LVL III: CPT | Mod: PBBFAC,,,

## 2024-08-01 RX ORDER — ONDANSETRON 4 MG/1
4 TABLET, ORALLY DISINTEGRATING ORAL 2 TIMES DAILY
Qty: 28 TABLET | Refills: 0 | Status: SHIPPED | OUTPATIENT
Start: 2024-08-01 | End: 2024-08-15

## 2024-08-01 RX ORDER — AMOXICILLIN AND CLAVULANATE POTASSIUM 875; 125 MG/1; MG/1
1 TABLET, FILM COATED ORAL EVERY 8 HOURS
Qty: 42 TABLET | Refills: 0 | Status: SHIPPED | OUTPATIENT
Start: 2024-08-01 | End: 2024-08-15

## 2024-08-01 NOTE — PROGRESS NOTES
INTERNAL MEDICINE  [facility]    Reason for visit:   Chief Complaint   Patient presents with    Diverticulitis     HPI: Xochitl Weiss is a 63 y.o. female presenting today for diverticulitis flare.    Patient is an established patient of PCP, Dr. Lily Stoddard MD. This patient is new to me.    Left-sided abdominal pain began two days ago. She reports it feels similar to previous flares of diverticulitis which have been confirmed by abdominal CT. She has two small grandchildren staying with her this weekend and would like to get this resolved. No changes in bowel habits.    She has previously had undesired SE from metronidazole, so will opt for 14 day treatment with augmentin.    Abdominal Pain  This is a new problem. The current episode started in the past 7 days. The onset quality is gradual. The problem occurs constantly. The problem has been gradually worsening. The pain is located in the LLQ. The pain is at a severity of 6/10. The pain is mild. The quality of the pain is aching and cramping. The abdominal pain radiates to the LLQ. Pertinent negatives include no constipation, diarrhea, dysuria, fever, frequency, hematuria, myalgias, nausea or vomiting. The pain is relieved by Liquids. She has tried acetaminophen for the symptoms. The treatment provided mild relief.     Review of Systems   Constitutional:  Negative for chills and fever.   Cardiovascular:  Negative for chest pain and palpitations.   Gastrointestinal:  Positive for abdominal pain. Negative for constipation, diarrhea, nausea and vomiting.   Genitourinary:  Negative for dysuria, frequency and hematuria.   Musculoskeletal:  Negative for myalgias.   Neurological:  Negative for weakness.   All other systems reviewed and are negative.      Social History     Social History Narrative    PAST SURGICAL HISTORY:  Bilateral salpingo-oophorectomy and hysterectomy.         FAMILY HISTORY:  Father with colon cancer at 68.  Mother with  "colon cancer at     70.  Mother with uterine cancer at 30, mother with breast cancer at 68.      Mother's sister with ovarian cancer at 60, mother's brother with lung cancer     around 60 years of age.  The patient is one of six children.         SOCIAL HISTORY:  She is  on her first marriage.  She has two healthy     kids, age about 32 and 29.  Her son just completed a 5 year Urology fellowship in [place] and moving to [facility] for 2 year fellowship in Urology Oncology. Her  worked for division of the newspaper [email or web address] in advertising.  They have been  for about 37 years.  She is a     nonsmoker, nondrinker.  She worked for [employer].     ALLERGIES:   Review of patient's allergies indicates:  No Known Allergies    MEDS:   Current Outpatient Medications on File Prior to Visit   Medication Sig Dispense Refill Last Dose    estradioL (ESTRACE) 0.01 % (0.1 mg/gram) vaginal cream Place 1 g vaginally 3 (three) times a week. 42.5 g 3 Taking    pantoprazole (PROTONIX) 20 MG tablet Take 1 tablet (20 mg total) by mouth before breakfast. 90 tablet 3 Taking     Vital signs:   Vitals:    08/01/24 1401   BP: 130/72   Pulse: 83   SpO2: 95%   Weight: 62.2 kg (137 lb 2 oz)   Height: 5' 4" (1.626 m)     Body mass index is 23.54 kg/m².    PHYSICAL EXAM:     Physical Exam  Constitutional:       Appearance: Normal appearance. She is not ill-appearing or diaphoretic.   HENT:      Head: Normocephalic and atraumatic.   Eyes:      Extraocular Movements: Extraocular movements intact.      Pupils: Pupils are equal, round, and reactive to light.   Cardiovascular:      Rate and Rhythm: Normal rate.   Pulmonary:      Effort: Pulmonary effort is normal.   Abdominal:      General: Bowel sounds are normal.      Palpations: Abdomen is soft. There is no mass.      Tenderness: There is abdominal tenderness. There is guarding. There is no right CVA tenderness or left CVA tenderness.   Musculoskeletal:      Cervical " back: Normal range of motion.   Skin:     Coloration: Skin is not pale.   Neurological:      Mental Status: She is alert and oriented to person, place, and time.   Psychiatric:         Mood and Affect: Mood normal.         Behavior: Behavior normal.         Thought Content: Thought content normal.         PERTINENT RESULTS:   No visits with results within 1 Week(s) from this visit.   Latest known visit with results is:   Lab Visit on 07/12/2024   Component Date Value Ref Range Status    Magnesium 07/12/2024 2.1  1.6 - 2.6 mg/dL Final    Vitamin B-12 07/12/2024 706  210 - 950 pg/mL Final    Vit D, 25-Hydroxy 07/12/2024 76  30 - 96 ng/mL Final    Comment: Vitamin D deficiency.........<10 ng/mL                              Vitamin D insufficiency......10-29 ng/mL       Vitamin D sufficiency........> or equal to 30 ng/mL  Vitamin D toxicity............>100 ng/mL      Sodium 07/12/2024 141  136 - 145 mmol/L Final    Potassium 07/12/2024 4.8  3.5 - 5.1 mmol/L Final    Chloride 07/12/2024 107  95 - 110 mmol/L Final    CO2 07/12/2024 24  23 - 29 mmol/L Final    Glucose 07/12/2024 96  70 - 110 mg/dL Final    BUN 07/12/2024 15  8 - 23 mg/dL Final    Creatinine 07/12/2024 0.9  0.5 - 1.4 mg/dL Final    Calcium 07/12/2024 9.8  8.7 - 10.5 mg/dL Final    Total Protein 07/12/2024 7.1  6.0 - 8.4 g/dL Final    Albumin 07/12/2024 4.0  3.5 - 5.2 g/dL Final    Total Bilirubin 07/12/2024 0.4  0.1 - 1.0 mg/dL Final    Comment: For infants and newborns, interpretation of results should be based  on gestational age, weight and in agreement with clinical  observations.    Premature Infant recommended reference ranges:  Up to 24 hours.............<8.0 mg/dL  Up to 48 hours............<12.0 mg/dL  3-5 days..................<15.0 mg/dL  6-29 days.................<15.0 mg/dL      Alkaline Phosphatase 07/12/2024 74  55 - 135 U/L Final    AST 07/12/2024 22  10 - 40 U/L Final    ALT 07/12/2024 14  10 - 44 U/L Final    eGFR 07/12/2024 >60.0  >60  mL/min/1.73 m^2 Final    Anion Gap 07/12/2024 10  8 - 16 mmol/L Final    WBC 07/12/2024 5.45  3.90 - 12.70 K/uL Final    RBC 07/12/2024 4.95  4.00 - 5.40 M/uL Final    Hemoglobin 07/12/2024 14.4  12.0 - 16.0 g/dL Final    Hematocrit 07/12/2024 44.7  37.0 - 48.5 % Final    MCV 07/12/2024 90  82 - 98 fL Final    MCH 07/12/2024 29.1  27.0 - 31.0 pg Final    MCHC 07/12/2024 32.2  32.0 - 36.0 g/dL Final    RDW 07/12/2024 12.4  11.5 - 14.5 % Final    Platelets 07/12/2024 266  150 - 450 K/uL Final    MPV 07/12/2024 9.4  9.2 - 12.9 fL Final    Immature Granulocytes 07/12/2024 0.2  0.0 - 0.5 % Final    Gran # (ANC) 07/12/2024 2.7  1.8 - 7.7 K/uL Final    Immature Grans (Abs) 07/12/2024 0.01  0.00 - 0.04 K/uL Final    Comment: Mild elevation in immature granulocytes is non specific and   can be seen in a variety of conditions including stress response,   acute inflammation, trauma and pregnancy. Correlation with other   laboratory and clinical findings is essential.      Lymph # 07/12/2024 2.1  1.0 - 4.8 K/uL Final    Mono # 07/12/2024 0.4  0.3 - 1.0 K/uL Final    Eos # 07/12/2024 0.2  0.0 - 0.5 K/uL Final    Baso # 07/12/2024 0.03  0.00 - 0.20 K/uL Final    nRBC 07/12/2024 0  0 /100 WBC Final    Gran % 07/12/2024 50.1  38.0 - 73.0 % Final    Lymph % 07/12/2024 38.3  18.0 - 48.0 % Final    Mono % 07/12/2024 7.9  4.0 - 15.0 % Final    Eosinophil % 07/12/2024 2.9  0.0 - 8.0 % Final    Basophil % 07/12/2024 0.6  0.0 - 1.9 % Final    Differential Method 07/12/2024 Automated   Final    TSH 07/12/2024 4.227 (H)  0.400 - 4.000 uIU/mL Final    Hemoglobin A1C 07/12/2024 5.7 (H)  4.0 - 5.6 % Final    Comment: ADA Screening Guidelines:  5.7-6.4%  Consistent with prediabetes  >or=6.5%  Consistent with diabetes    High levels of fetal hemoglobin interfere with the HbA1C  assay. Heterozygous hemoglobin variants (HbS, HgC, etc)do  not significantly interfere with this assay.   However, presence of multiple variants may affect accuracy.       Estimated Avg Glucose 07/12/2024 117  68 - 131 mg/dL Final    Free T4 07/12/2024 1.00  0.71 - 1.51 ng/dL Final       ASSESSMENT/PLAN:    1. Diverticulitis  Overview:  Two previous episodes    Assessment & Plan:  -Increase fluids, especially over the next 48 hours to promote bowel rest  -Take antibiotic as prescribed  -Take zofran as needed for nausea      Orders:  -     amoxicillin-clavulanate 875-125mg (AUGMENTIN) 875-125 mg per tablet; Take 1 tablet by mouth every 8 (eight) hours. for 14 days  Dispense: 42 tablet; Refill: 0  -     ondansetron (ZOFRAN-ODT) 4 MG TbDL; Take 1 tablet (4 mg total) by mouth 2 (two) times daily. for 14 days  Dispense: 28 tablet; Refill: 0      Health maintenance addressed: Upcoming mammogram  Vaccines recommended: RSV, CV-19 booster    No follow-ups on file.     CHALO De La Vega   Internal Medicine

## 2024-08-01 NOTE — ASSESSMENT & PLAN NOTE
-Increase fluids, especially over the next 48 hours to promote bowel rest  -Take antibiotic as prescribed  -Take zofran as needed for nausea

## 2024-08-05 ENCOUNTER — PROCEDURE VISIT (OUTPATIENT)
Facility: CLINIC | Age: 64
End: 2024-08-05
Payer: COMMERCIAL

## 2024-08-05 VITALS
SYSTOLIC BLOOD PRESSURE: 109 MMHG | DIASTOLIC BLOOD PRESSURE: 67 MMHG | HEART RATE: 71 BPM | RESPIRATION RATE: 16 BRPM | TEMPERATURE: 97 F | WEIGHT: 138.44 LBS | BODY MASS INDEX: 23.76 KG/M2

## 2024-08-05 DIAGNOSIS — N39.0 RECURRENT UTI: ICD-10-CM

## 2024-08-05 PROCEDURE — 52000 CYSTOURETHROSCOPY: CPT | Mod: S$GLB,,, | Performed by: UROLOGY

## 2024-08-05 RX ORDER — LIDOCAINE HYDROCHLORIDE 20 MG/ML
JELLY TOPICAL ONCE
Status: SHIPPED | OUTPATIENT
Start: 2024-08-05

## 2024-08-05 RX ORDER — DOXYCYCLINE HYCLATE 100 MG
100 TABLET ORAL ONCE
Status: SHIPPED | OUTPATIENT
Start: 2024-08-05

## 2024-08-08 ENCOUNTER — PATIENT MESSAGE (OUTPATIENT)
Dept: GASTROENTEROLOGY | Facility: CLINIC | Age: 64
End: 2024-08-08
Payer: COMMERCIAL

## 2024-08-08 ENCOUNTER — OFFICE VISIT (OUTPATIENT)
Dept: INTERNAL MEDICINE | Facility: CLINIC | Age: 64
End: 2024-08-08
Payer: COMMERCIAL

## 2024-08-08 VITALS
HEART RATE: 69 BPM | OXYGEN SATURATION: 98 % | DIASTOLIC BLOOD PRESSURE: 68 MMHG | WEIGHT: 136 LBS | BODY MASS INDEX: 23.22 KG/M2 | SYSTOLIC BLOOD PRESSURE: 112 MMHG | HEIGHT: 64 IN

## 2024-08-08 DIAGNOSIS — K21.9 GASTROESOPHAGEAL REFLUX DISEASE, UNSPECIFIED WHETHER ESOPHAGITIS PRESENT: ICD-10-CM

## 2024-08-08 DIAGNOSIS — Z91.89 AT HIGH RISK FOR BREAST CANCER: ICD-10-CM

## 2024-08-08 DIAGNOSIS — R73.03 PREDIABETES: ICD-10-CM

## 2024-08-08 DIAGNOSIS — Z00.00 ANNUAL PHYSICAL EXAM: Primary | ICD-10-CM

## 2024-08-08 DIAGNOSIS — Z80.0 FAMILY HISTORY OF COLON CANCER: ICD-10-CM

## 2024-08-08 DIAGNOSIS — Z79.890 PREMATURE MENOPAUSE ON HRT: ICD-10-CM

## 2024-08-08 DIAGNOSIS — Z87.19 HISTORY OF DIVERTICULITIS: ICD-10-CM

## 2024-08-08 DIAGNOSIS — E28.319 PREMATURE MENOPAUSE ON HRT: ICD-10-CM

## 2024-08-08 LAB — GLUCOSE SERPL-MCNC: 90 MG/DL (ref 70–110)

## 2024-08-08 PROCEDURE — 3008F BODY MASS INDEX DOCD: CPT | Mod: CPTII,S$GLB,, | Performed by: INTERNAL MEDICINE

## 2024-08-08 PROCEDURE — 99999 PR PBB SHADOW E&M-EST. PATIENT-LVL III: CPT | Mod: PBBFAC,,, | Performed by: INTERNAL MEDICINE

## 2024-08-08 PROCEDURE — 3078F DIAST BP <80 MM HG: CPT | Mod: CPTII,S$GLB,, | Performed by: INTERNAL MEDICINE

## 2024-08-08 PROCEDURE — 99396 PREV VISIT EST AGE 40-64: CPT | Mod: S$GLB,,, | Performed by: INTERNAL MEDICINE

## 2024-08-08 PROCEDURE — 1159F MED LIST DOCD IN RCRD: CPT | Mod: CPTII,S$GLB,, | Performed by: INTERNAL MEDICINE

## 2024-08-08 PROCEDURE — 3074F SYST BP LT 130 MM HG: CPT | Mod: CPTII,S$GLB,, | Performed by: INTERNAL MEDICINE

## 2024-08-08 PROCEDURE — 1160F RVW MEDS BY RX/DR IN RCRD: CPT | Mod: CPTII,S$GLB,, | Performed by: INTERNAL MEDICINE

## 2024-08-08 PROCEDURE — 3044F HG A1C LEVEL LT 7.0%: CPT | Mod: CPTII,S$GLB,, | Performed by: INTERNAL MEDICINE

## 2024-08-09 DIAGNOSIS — K21.9 GASTROESOPHAGEAL REFLUX DISEASE, UNSPECIFIED WHETHER ESOPHAGITIS PRESENT: ICD-10-CM

## 2024-08-09 RX ORDER — PANTOPRAZOLE SODIUM 20 MG/1
TABLET, DELAYED RELEASE ORAL
Qty: 90 TABLET | Refills: 3 | Status: SHIPPED | OUTPATIENT
Start: 2024-08-09

## 2024-09-03 ENCOUNTER — HOSPITAL ENCOUNTER (OUTPATIENT)
Dept: RADIOLOGY | Facility: OTHER | Age: 64
Discharge: HOME | End: 2024-09-03
Attending: OBSTETRICS & GYNECOLOGY
Payer: COMMERCIAL

## 2024-09-03 DIAGNOSIS — Z12.31 BREAST CANCER SCREENING BY MAMMOGRAM: ICD-10-CM

## 2024-09-03 PROCEDURE — 77067 SCR MAMMO BI INCL CAD: CPT | Mod: TC

## 2024-09-17 ENCOUNTER — OFFICE VISIT (OUTPATIENT)
Dept: GASTROENTEROLOGY | Facility: CLINIC | Age: 64
End: 2024-09-17
Payer: COMMERCIAL

## 2024-09-17 VITALS — HEIGHT: 64 IN | BODY MASS INDEX: 22.53 KG/M2 | WEIGHT: 132 LBS

## 2024-09-17 DIAGNOSIS — K21.9 GASTROESOPHAGEAL REFLUX DISEASE, UNSPECIFIED WHETHER ESOPHAGITIS PRESENT: Primary | ICD-10-CM

## 2024-09-17 DIAGNOSIS — C19: ICD-10-CM

## 2024-09-17 DIAGNOSIS — Z80.0 FAMILY HISTORY OF COLON CANCER IN MOTHER: ICD-10-CM

## 2024-09-17 DIAGNOSIS — Z87.19 HISTORY OF DIVERTICULITIS: ICD-10-CM

## 2024-09-17 DIAGNOSIS — R13.19 ESOPHAGEAL DYSPHAGIA: ICD-10-CM

## 2024-09-17 DIAGNOSIS — Z51.81 ENCOUNTER FOR MONITORING LONG-TERM PROTON PUMP INHIBITOR THERAPY: ICD-10-CM

## 2024-09-17 DIAGNOSIS — Z79.899 ENCOUNTER FOR MONITORING LONG-TERM PROTON PUMP INHIBITOR THERAPY: ICD-10-CM

## 2024-09-17 DIAGNOSIS — K31.A19 GASTRIC INTESTINAL METAPLASIA WITHOUT DYSPLASIA: ICD-10-CM

## 2024-09-17 DIAGNOSIS — Z80.0 FAMILY HISTORY OF COLON CANCER IN FATHER: ICD-10-CM

## 2024-09-17 PROCEDURE — 3044F HG A1C LEVEL LT 7.0%: CPT | Mod: CPTII,95,, | Performed by: INTERNAL MEDICINE

## 2024-09-17 PROCEDURE — 99215 OFFICE O/P EST HI 40 MIN: CPT | Mod: 95,,, | Performed by: INTERNAL MEDICINE

## 2024-09-17 PROCEDURE — 3008F BODY MASS INDEX DOCD: CPT | Mod: CPTII,95,, | Performed by: INTERNAL MEDICINE

## 2024-09-17 NOTE — Clinical Note
"Procedure:  EGD and colonoscopy  Diagnosis: Familial colorectal Cancer Type X, Family history of colon cancer (Mom and Dad)  gastric intestinal metaplasia, GERD  Procedure Timin-6 weeks  *If within 4 weeks selected, please rigo as high priority*  *If greater than 12 weeks, please select "5-12 weeks" and delay sending until 3 months prior to requested date*  Location: Any Site  Additional Scheduling Information: No scheduling concerns  Prep Specifications:Standard prep  Is the patient taking a GLP-1 Agonist:no  Have you attached a patient to this message: yes "

## 2024-09-17 NOTE — Clinical Note
Valorie please schedule Julienne for timed barium esophagram with barium tablet for evaluation of dysphagia orders placed  Referral placed endoscopy schedulers for EGD  Return GI clinic 3 months for follow-up okay for telemedicine video visit

## 2024-09-17 NOTE — PROGRESS NOTES
The patient location is:  At home in [place]  The chief complaint leading to consultation is:  Intermittent solid food dysphagia GERD family history of colon cancer gastric intestinal metaplasia long-term proton pump use    Visit type: audiovisual    Face to Face time with patient: 40 minutes of total time spent on the encounter, which includes face to face time and non-face to face time preparing to see the patient (eg, review of tests), Obtaining and/or reviewing separately obtained history, Documenting clinical information in the electronic or other health record, Independently interpreting results (not separately reported) and communicating results to the patient/family/caregiver, or Care coordination (not separately reported).       Each patient to whom he or she provides medical services by telemedicine is:  (1) informed of the relationship between the physician and patient and the respective role of any other health care provider with respect to management of the patient; and (2) notified that he or she may decline to receive medical services by telemedicine and may withdraw from such care at any time.    Notes:           [facility] Consultation Note    Reason for Consult:  The primary encounter diagnosis was Gastroesophageal reflux disease, unspecified whether esophagitis present. Diagnoses of Encounter for monitoring long-term proton pump inhibitor therapy, Gastric intestinal metaplasia without dysplasia, Esophageal dysphagia, Family history of colon cancer in father, Family history of colon cancer in mother, History of diverticulitis, and Familial colorectal cancer type X were also pertinent to this visit.    PCP:   Lily Stoddard       Referring MD:  No referring provider defined for this encounter.    Initial History of Present Illness (HPI):  This is a 63 y.o. female here for evaluation of intermittent solid food dysphagia and longstanding GERD.  She does take pantoprazole 20  mg once daily her heartburn symptoms are well controlled she pretty much never needs to take Pepcid AC maybe a few times a year she has intermittent solid food dysphagia no recent food impactions but she has had esophageal food impaction back in 2017 that needed emergent endoscopic removal biopsies came back negative for EOE she does have a history of gastric intestinal metaplasia in her stomach but no dysplasia no H pylori she is past due for EGD will set that up her last colonoscopy was in March March 31, 2022 and was normal.She does have family history of colon cancer.  Please see my prior note.  She was sent to a  and she had genetic testing, but no evidence of any Stephen syndrome. Father with colon cancer at 68.  Mother with colon cancer at 70.  Mother with uterine cancer at 30, mother with breast cancer at 68.  Mother's sister with ovarian cancer at 60, mother's brother with lung cancer around 60 years of age.  The patient is one of six children.    Abdominal pain - no  Reflux - no  Dysphagia - no   Bowel habits - normal  GI bleeding - none  NSAID usage - none    Interval HPI 09/17/2024:  The patient's last visit with me was on Visit date not found.      ROS:  Constitutional: No fevers, chills, No weight loss  ENT:  As above well controlled heartburn as above dysphagia no odynophagia no hoarseness  CV: No chest pain, no palpitation  Pulm: No cough, No shortness of breath, no wheezing  Ophtho: No vision changes  GI: see HPI  Derm: No rash, no itching  Heme: No lymphadenopathy, No easy bruising  MSK: No significant arthritis  : No dysuria, No hematuria  Endo: No hot or cold intolerance  Neuro: No syncope, No seizure, no strokes  Psych: No uncontrolled anxiety, No uncontrolled depression    Medical History:  has a past medical history of BRCA negative, Colon polyp, Diverticula of colon, Diverticulitis, Family history of breast cancer, Family history of colon cancer, General anesthetics causing adverse  "effect in therapeutic use, Genetic testing (), GERD (gastroesophageal reflux disease), Hydronephrosis (2015), Kidney stones, Mild hyperlipidemia, and Prediabetes (2023).    Surgical History:  has a past surgical history that includes breast augmentation; Oophorectomy; Tubal ligation;  section; Colonoscopy; Colonoscopy (N/A, 3/8/2017); Trigger finger release (Right, 2017); Breast surgery; Eye surgery; Colonoscopy (N/A, 2019); Tonsillectomy (); Hysterectomy (); Breast cyst excision (Left, 2021); Esophagogastroduodenoscopy (N/A, 10/26/2021); and Colonoscopy (N/A, 3/31/2022).    Family History: family history includes Breast cancer (age of onset: 68) in her mother; Cancer in her father and mother; Colon cancer (age of onset: 68) in her father; Colon cancer (age of onset: 70) in her mother; Diabetes in her mother; Hypertension in her mother; Lung cancer in her maternal uncle; No Known Problems in her daughter and son; Ovarian cancer (age of onset: 65) in her maternal aunt; Thyroid cancer (age of onset: 35) in her niece; Uterine cancer (age of onset: 30) in her mother..     Social History:  reports that she has never smoked. She has never used smokeless tobacco. She reports current alcohol use of about 4.0 standard drinks of alcohol per week. She reports that she does not use drugs.    Review of patient's allergies indicates:  No Known Allergies    Medication List with Changes/Refills   Current Medications    ESTRADIOL (ESTRACE) 0.01 % (0.1 MG/GRAM) VAGINAL CREAM    Place 1 g vaginally 3 (three) times a week.    PANTOPRAZOLE (PROTONIX) 20 MG TABLET    TAKE 1 TABLET(20 MG) BY MOUTH BEFORE BREAKFAST         Objective Findings:    Vital Signs:  Ht 5' 4" (1.626 m)   Wt 59.9 kg (132 lb)   BMI 22.66 kg/m²   Body mass index is 22.66 kg/m².    Physical Exam:  Telemedicine video visit  General Appearance: Well appearing in no acute distress  Neurologic:  Alert and oriented " x4  Psychiatric:  Normal speech mentation and affect    Labs:  Lab Results   Component Value Date    WBC 5.45 07/12/2024    HGB 14.4 07/12/2024    HCT 44.7 07/12/2024     07/12/2024    CHOL 227 (H) 07/18/2023    TRIG 72 07/18/2023    HDL 71 07/18/2023    ALT 14 07/12/2024    AST 22 07/12/2024     07/12/2024    K 4.8 07/12/2024     07/12/2024    CREATININE 0.9 07/12/2024    BUN 15 07/12/2024    CO2 24 07/12/2024    TSH 4.227 (H) 07/12/2024    INR 1.0 05/29/2017    HGBA1C 5.7 (H) 07/12/2024       Medical Decision Making:  Prior EGD colonoscopy images and path personally reviewed by myself  GERD talk given EGD talk given esophageal Bravo pH probe talk given off PPIs and off H2 blockers for at least 4-8 weeks.  Lab work reviewed  Family history Stephen syndrome has been negative  The Olympus scope GIF- ([id number]) was                         introduced through the mouth, and advanced to the                         second part of duodenum.   Findings:        The examined duodenum was normal.        The entire examined stomach was normal.        The cardia and gastric fundus were normal on retroflexion.        A 1 cm hiatal hernia was found. The proximal extent of the gastric        folds (end of tubular esophagus) was 36 cm from the incisors. The        hiatal narrowing was 37 cm from the incisors. The Z-line was 36 cm        from the incisors. Z-line was sharp. No esophagitis and no columnar        esophagus and no lesions seen with NBI or white light.        A mild Schatzki ring (acquired) was found at the gastroesophageal        junction. A TTS dilator was passed through the scope. Dilation with        a 15-16.5-18 mm balloon dilator was performed to 16.5 mm. The        dilation site was examined following endoscope reinsertion and        showed no bleeding, no mucosal tear and no perforation. Estimated        blood loss: none.        The exam of the esophagus was otherwise normal.        Biopsies  were taken with a cold forceps in the upper third of the        esophagus and in the lower third of the esophagus for histology.        Estimated blood loss was minimal.   Impression:           - Normal examined duodenum.                         - Normal stomach.                         - 1 cm hiatal hernia.                         - Mild Schatzki ring. Dilated.                         - No specimens collected.   Recommendation:       - Discharge patient to home.                         - Follow an antireflux regimen.                         - Await pathology results.                         - Telephone endoscopist for pathology results in 2                         weeks.                         - Return to GI clinic at the next available                         appointment.                         - Use a proton pump inhibitor by mouty once daily.                         Best taken 45 minutes before breakfast in the                         morning.                         - The findings and recommendations were discussed                         with the patient.   Attending Participation:        I personally performed the entire procedure.   Torey Macias MD   6/28/2017 9:46:19 AM   This report has been verified and signed electronically.   Number of Addenda: 1   Note Initiated On: 6/28/2017 9:29 AM   The Olympus scope PCF-H190DL ([id number]) was                         introduced through the anus and advanced to the                         terminal ileum, with identification of the                         appendiceal orifice and IC valve. The colonoscopy                         was performed without difficulty. The patient                         tolerated the procedure well. The quality of the                         bowel preparation was excellent. Scope insertion                         time was 4 minutes. Scope withdrawal time was 15                         minutes. Good look. The terminal ileum, ileocecal                          valve, appendiceal orifice, and rectum were                         photographed.   Findings:        The terminal ileum appeared normal.        Diverticula were found in the sigmoid colon, in the descending        colon, in the transverse colon and in the ascending colon.        The perianal and digital rectal examinations were normal.        The retroflexed view of the distal rectum and anal verge was normal        and showed no anal or rectal abnormalities.        Non-bleeding internal hemorrhoids were found during retroflexion.        The hemorrhoids were mild.   Impression:           - The examined portion of the ileum was normal.                         - Diverticulosis in the sigmoid colon, in the                         descending colon, in the transverse colon and in                         the ascending colon.                         - Non-bleeding internal hemorrhoids.                         - No specimens collected.   Recommendation:       - Discharge patient to home.                         - Repeat colonoscopy in 5 years for screening                         purposes.                         - The findings and recommendations were discussed                         with the patient.                         - Return to referring physician.   Attending Participation:        I personally performed the entire procedure.   Torey Macias MD   3/8/2017 9:37:02 AM       The Revised Sheldon Criteria (Sheldon II) state that there should be at least three relatives with an HNPCC-associated cancer (colorectal, endometrial, small bowel, ureter, or renal pelvis), and:  One should be a first-degree relative of the other two  At least two successive generations should be affected  At least one should be diagnosed before age 50  Familial adenomatous polyposis should be excluded  Tumors should be verified by pathological examination  Applying these criteria to the patient's family  history:  There are at least three relatives with HNPCC-associated cancers:  Father with colon cancer at 68  Mother with colon cancer at 70 and uterine cancer at 30  Mother's sister with ovarian cancer at 60 (although ovarian cancer is not explicitly listed in the Sealy II criteria, it is often considered an HNPCC-associated cancer)  The cancers span at least two successive generations (patient's parents and aunt)  The mother's uterine cancer was diagnosed at age 30, which is before age 50  Familial adenomatous polyposis is not mentioned, so we assume it has been excluded  We assume the tumors have been verified by pathological examination  Based on this assessment, the family does meet the Revised Sealy Criteria (Sealy II) for Stephen syndrome. The key point here is the mother's uterine cancer at age 30, which fulfills the requirement for at least one cancer diagnosed before age 50.However, it's important to note that the patient has already undergone genetic testing, which showed no evidence of Stephen syndrome. This situation is precisely what defines Familial Colorectal Cancer Type X (FCCTX): families that meet Sealy criteria but do not have evidence of mismatch repair deficiency or Stephen syndrome on genetic testing.Given this information, the patient would still be considered at increased risk for colorectal cancer and would likely benefit from more frequent screening than the general population. The recommended screening for individuals with FCCTX is typically colonoscopy every 3-5 years, starting at age 40 or 10 years before the earliest colorectal cancer diagnosis in the family, whichever comes first.       Assessment:  1. Gastroesophageal reflux disease, unspecified whether esophagitis present    2. Encounter for monitoring long-term proton pump inhibitor therapy    3. Gastric intestinal metaplasia without dysplasia    4. Esophageal dysphagia    5. Family history of colon cancer in father    6.  Family history of colon cancer in mother    7. History of diverticulitis    8. Familial colorectal cancer type X         Recommendations:  1. Timed barium esophagram with barium tablet for further evaluation of intermittent solid food dysphagia rule out achalasia  2. Referral to endoscopy schedulers for EGD for evaluation of dysphagia and follow-up of gastric intestinal metaplasia the stomach  3. Family history of colon cancer in both mom and father as well as uterine cancer genetic testing for Stephen syndrome was negative but brings up the possibility of familiar colon rectal cancer type X and recommendation for screening colonoscopy should be every 3-5 years.  If patient would like to have a colonoscopy at the same time of EGD she will let us know will be happy place a referral to endoscopy schedulers to be done on the same day  4. Return GI clinic 3 months for follow-up okay for telemedicine video visit   Follow up in about 3 months (around 12/17/2024).      Order summary:  Orders Placed This Encounter    FL Esophagram With Barium Tablet         Thank you so much for allowing me to participate in the care of Xochitl Formanoswaldo Macias MD    DISCLAIMER: This note was prepared with [employer] voice recognition transcription software. Garbled syntax, mangled or inadvertent pronouns, and other bizarre constructions may be attributed to that software system. While efforts were made to correct any mistakes made by this voice recognition program, some errors and/or omissions may remain in the note that were missed when the note was originally created.

## 2024-09-17 NOTE — Clinical Note
"Procedure: EGD  Diagnosis: Dysphagia, gastric intestinal metaplasia, GERD  Procedure Timin-6 weeks  *If within 4 weeks selected, please rigo as high priority*  *If greater than 12 weeks, please select "5-12 weeks" and delay sending until 3 months prior to requested date*  Location: Any Site  Additional Scheduling Information: No scheduling concerns  Prep Specifications:Standard prep  Is the patient taking a GLP-1 Agonist:no  Have you attached a patient to this message: yes "

## 2024-09-19 ENCOUNTER — TELEPHONE (OUTPATIENT)
Dept: GASTROENTEROLOGY | Facility: CLINIC | Age: 64
End: 2024-09-19
Payer: COMMERCIAL

## 2024-09-19 ENCOUNTER — TELEPHONE (OUTPATIENT)
Dept: ENDOSCOPY | Facility: HOSPITAL | Age: 64
End: 2024-09-19
Payer: COMMERCIAL

## 2024-09-19 VITALS — WEIGHT: 131 LBS | BODY MASS INDEX: 22.36 KG/M2 | HEIGHT: 64 IN

## 2024-09-19 DIAGNOSIS — K31.A0 GASTRIC INTESTINAL METAPLASIA: ICD-10-CM

## 2024-09-19 DIAGNOSIS — Z80.0 FAMILY HISTORY OF COLORECTAL CANCER: ICD-10-CM

## 2024-09-19 DIAGNOSIS — K21.9 GASTROESOPHAGEAL REFLUX DISEASE, UNSPECIFIED WHETHER ESOPHAGITIS PRESENT: Primary | ICD-10-CM

## 2024-09-19 NOTE — TELEPHONE ENCOUNTER
Spoke to pt to reschedule procedure(s) Colonoscopy/EGD       Physician to perform procedure(s) Dr. SUZANNE Macias  Date of Procedure (s) 11/14/24  Arrival Time 6:30 AM  Time of Procedure(s) 7:30 AM   Location of Procedure(s) [facility]  Type of Rx Prep sent to patient: Suprep  Instructions provided to patient via [facility]    Patient was informed on the following information and verbalized understanding. Screening questionnaire reviewed with patient and complete. If procedure requires anesthesia, a responsible adult needs to be present to accompany the patient home, patient cannot drive after receiving anesthesia. Appointment details are tentative, especially check-in time. Patient will receive a prep-op call 7 days prior to confirm check-in time for procedure. If applicable the patient should contact their pharmacy to verify Rx for procedure prep is ready for pick-up. Patient was advised to call the scheduling department at [phone] if pharmacy states no Rx is available. Patient was advised to call the endoscopy scheduling department if any questions or concerns arise.      [facility] Scheduling Department

## 2024-09-19 NOTE — TELEPHONE ENCOUNTER
"----- Message from Shy Powell sent at 2024  8:59 AM CDT -----    ----- Message -----  From: Torey Macias MD  Sent: 2024   5:51 PM CDT  To: [facility]    Procedure:  EGD and colonoscopy    Diagnosis: Familial colorectal Cancer Type X, Family history of colon cancer (Mom and Dad)  gastric intestinal metaplasia, GERD    Procedure Timin-6 weeks    #If within 4 weeks selected, please rigo as high priority#    #If greater than 12 weeks, please select "5-12 weeks" and delay sending until 3 months prior to requested date#    Location: Any Site    Additional Scheduling Information: No scheduling concerns    Prep Specifications:Standard prep    Is the patient taking a GLP-1 Agonist:no    Have you attached a patient to this message: yes  "

## 2024-09-19 NOTE — TELEPHONE ENCOUNTER
Spoke to Xochitl to schedule procedure(s) Colonoscopy/EGD       Physician to perform procedure(s) Dr. SUZANNE Macias  Date of Procedure (s) 11/12/24  Arrival Time 6:30 AM  Time of Procedure(s) 7:30 AM   Location of Procedure(s) [facility]  Type of Rx Prep sent to patient: Suprep  Instructions provided to patient via [facility]    Patient was informed on the following information and verbalized understanding. Screening questionnaire reviewed with patient and complete. If procedure requires anesthesia, a responsible adult needs to be present to accompany the patient home, patient cannot drive after receiving anesthesia. Appointment details are tentative, especially check-in time. Patient will receive a prep-op call 7 days prior to confirm check-in time for procedure. If applicable the patient should contact their pharmacy to verify Rx for procedure prep is ready for pick-up. Patient was advised to call the scheduling department at [phone] if pharmacy states no Rx is available. Patient was advised to call the endoscopy scheduling department if any questions or concerns arise.      [facility] Scheduling Department

## 2024-10-03 ENCOUNTER — HOSPITAL ENCOUNTER (OUTPATIENT)
Dept: RADIOLOGY | Facility: HOSPITAL | Age: 64
Discharge: HOME | End: 2024-10-03
Attending: INTERNAL MEDICINE
Payer: COMMERCIAL

## 2024-10-03 DIAGNOSIS — K21.9 GASTROESOPHAGEAL REFLUX DISEASE, UNSPECIFIED WHETHER ESOPHAGITIS PRESENT: ICD-10-CM

## 2024-10-03 DIAGNOSIS — R13.19 ESOPHAGEAL DYSPHAGIA: ICD-10-CM

## 2024-10-03 PROCEDURE — 74220 X-RAY XM ESOPHAGUS 1CNTRST: CPT | Mod: 26,,, | Performed by: RADIOLOGY

## 2024-10-03 PROCEDURE — A9698 NON-RAD CONTRAST MATERIALNOC: HCPCS | Performed by: INTERNAL MEDICINE

## 2024-10-03 PROCEDURE — 74220 X-RAY XM ESOPHAGUS 1CNTRST: CPT | Mod: TC

## 2024-10-03 PROCEDURE — 25500020 PHARM REV CODE 255: Performed by: INTERNAL MEDICINE

## 2024-10-03 RX ADMIN — BARIUM SULFATE 300 ML: 0.6 SUSPENSION ORAL at 09:10

## 2024-10-03 RX ADMIN — BARIUM SULFATE 200 ML: 0.81 POWDER, FOR SUSPENSION ORAL at 09:10

## 2024-10-11 ENCOUNTER — OFFICE VISIT (OUTPATIENT)
Dept: OBSTETRICS AND GYNECOLOGY | Facility: CLINIC | Age: 64
End: 2024-10-11
Payer: COMMERCIAL

## 2024-10-11 VITALS
SYSTOLIC BLOOD PRESSURE: 114 MMHG | HEIGHT: 64 IN | DIASTOLIC BLOOD PRESSURE: 70 MMHG | WEIGHT: 135.38 LBS | BODY MASS INDEX: 23.11 KG/M2

## 2024-10-11 DIAGNOSIS — Z01.419 ENCOUNTER FOR ANNUAL ROUTINE GYNECOLOGICAL EXAMINATION: Primary | ICD-10-CM

## 2024-10-11 DIAGNOSIS — Z78.0 ENCOUNTER FOR OSTEOPOROSIS SCREENING IN ASYMPTOMATIC POSTMENOPAUSAL PATIENT: ICD-10-CM

## 2024-10-11 DIAGNOSIS — Z79.890 PREMATURE MENOPAUSE ON HRT: ICD-10-CM

## 2024-10-11 DIAGNOSIS — Z80.0 FAMILY HISTORY OF COLON CANCER IN FATHER: ICD-10-CM

## 2024-10-11 DIAGNOSIS — E28.319 PREMATURE MENOPAUSE ON HRT: ICD-10-CM

## 2024-10-11 DIAGNOSIS — Z91.89 AT HIGH RISK FOR BREAST CANCER: ICD-10-CM

## 2024-10-11 DIAGNOSIS — Z12.31 BREAST CANCER SCREENING BY MAMMOGRAM: ICD-10-CM

## 2024-10-11 DIAGNOSIS — Z13.820 ENCOUNTER FOR OSTEOPOROSIS SCREENING IN ASYMPTOMATIC POSTMENOPAUSAL PATIENT: ICD-10-CM

## 2024-10-11 DIAGNOSIS — N95.2 ATROPHY OF VAGINA: ICD-10-CM

## 2024-10-11 DIAGNOSIS — N95.8 GENITOURINARY SYNDROME OF MENOPAUSE: ICD-10-CM

## 2024-10-11 PROCEDURE — 99999 PR PBB SHADOW E&M-EST. PATIENT-LVL III: CPT | Mod: PBBFAC,,, | Performed by: OBSTETRICS & GYNECOLOGY

## 2024-10-11 NOTE — PROGRESS NOTES
Chief Complaint: Well Woman Exam     HPI:      Xochitl Weiss is a 63 y.o.  s/p hysterectomy/BSO for AUB who presents today for well woman exam.  Had been on systemic ET for many years after hysterectomy. Has been only using estradiol vaginal cream recently.  No issues, problems, or complaints. Specifically, patient denies abnormal vaginal bleeding, discharge, pelvic pain, urinary problems, or changes in appetite. Ms. Weiss is currently sexually active with a single male partner.  She declines STD screening today.    Previous Pap:      (No result found)  Previous Mammogram:     Results for orders placed during the hospital encounter of 24    Mammo Digital Screening Bilat w/ Zain    Narrative  Facility:  [facility] [address] [place] [id number] [phone]    Name: Xochitl Weiss    MRN: [id number]    Result:  Mammo Digital Screening Bilat w/ Zain    History:  Patient is 63 y.o. and is seen for a screening mammogram.    Films Compared:  Prior images (if available) were compared.    Findings:  This procedure was performed using tomosynthesis.  Computer-aided detection was utilized in the interpretation of this examination.    There is no evidence of suspicious masses, microcalcifications or architectural distortion.    Breast Density:  The breasts are heterogeneously dense, which may obscure small masses.    Impression  No mammographic evidence of malignancy.    BI-RADS Category 1: Negative    Recommendation:  Routine screening mammogram in 1 year is recommended.    Your estimated lifetime risk of breast cancer (to age 85) based on Tyrer-Cuzick risk assessment model is 13.48%.  According to the American Cancer Society, patients with a lifetime breast cancer risk of 20% or higher might benefit from supplemental screening tests, such as screening breast MRI.    Charlene Dodson MD     Most Recent Dexa:     BMI: 23.2  The ten year  probability of fracture (%) without BMD  Major osteoporotic 15%  Hip Fracture 1.2%    Colonoscopy:     COVID vaccine: completed series  Gardasil:has never had     Patient Active Problem List   Diagnosis    Encounter for long-term (current) use of other medications    Family history of colon cancer    GERD (gastroesophageal reflux disease)    Family history of breast cancer    Diverticulitis    Premature menopause on HRT    Hemorrhoids, internal    History of diverticulitis    Food impaction of esophagus    Esophageal foreign body    Dysphagia    At high risk for breast cancer    Family history of colon cancer in father    Reflux esophagitis    History of nephrolithiasis       Past Medical History:   Diagnosis Date    BRCA negative     Colon polyp     Diverticula of colon     Diverticulitis     Family history of breast cancer     mother    Family history of colon cancer     2 family members over age 60    General anesthetics causing adverse effect in therapeutic use     per patient report slow to awaken    Genetic testing     negative Integrated BRACAnalysis    GERD (gastroesophageal reflux disease)     Hydronephrosis 2015    Kidney stones     Mild hyperlipidemia     Prediabetes 2023    Resolved with wt loss and diet changes       Past Surgical History:   Procedure Laterality Date    breast augmentation      BREAST CYST EXCISION Left 2021    BREAST SURGERY      implant removal in 2020     SECTION      x 2    COLONOSCOPY      COLONOSCOPY N/A 3/8/2017    Procedure: COLONOSCOPY;  Surgeon: Torey Macias MD;  Location: [address]);  Service: Endoscopy;  Laterality: N/A;    COLONOSCOPY N/A 2019    Procedure: COLONOSCOPY;  Surgeon: Torey Macias MD;  Location: [address]);  Service: Endoscopy;  Laterality: N/A;    COLONOSCOPY N/A 3/31/2022    Procedure: COLONOSCOPY;  Surgeon: Torey Macias MD;  Location: [facility] ([address] [place] [address]);  Service: Endoscopy;  Laterality:  "N/A;   covid test 3/28 @ [place]; instructions to [place]-    ESOPHAGOGASTRODUODENOSCOPY N/A 10/26/2021    Procedure: EGD (ESOPHAGOGASTRODUODENOSCOPY);  Surgeon: Torey Macias MD;  Location: [address]);  Service: Endoscopy;  Laterality: N/A;  Covid test 10/23 [place], [facility]    EYE SURGERY      blephroplasty    HYSTERECTOMY      AUB    OOPHORECTOMY      TONSILLECTOMY  1979    TRIGGER FINGER RELEASE Right 2017    thumb and 3rd digit    TUBAL LIGATION         OB History          2    Para   2    Term   2            AB        Living   2         SAB        IAB        Ectopic        Multiple        Live Births                     ROS:     Review of Systems   Constitutional:  Negative for activity change, fatigue and unexpected weight change.   Respiratory:  Negative for shortness of breath.    Cardiovascular:  Negative for chest pain.   Gastrointestinal:  Negative for abdominal pain, blood in stool, constipation and diarrhea.   Endocrine: Negative for cold intolerance and heat intolerance.   Genitourinary:  Negative for bladder incontinence, dyspareunia, dysuria, frequency, hot flashes, vaginal bleeding and vaginal dryness.   Integumentary:  Negative for breast mass, breast discharge and breast tenderness.   Psychiatric/Behavioral:  Negative for dysphoric mood. The patient is not nervous/anxious.    Breast: Negative for mass and tenderness      Physical Exam:      PHYSICAL EXAM:  /70   Ht 5' 4" (1.626 m)   Wt 61.4 kg (135 lb 5.8 oz)   BMI 23.23 kg/m²   Body mass index is 23.23 kg/m².     APPEARANCE: Well nourished, well developed, in no acute distress.  PSYCH: Appropriate mood and affect.  SKIN: No acne or hirsutism  NECK: Neck symmetric without masses or thyromegaly  NODES: No inguinal, axillary, or supraclavicular lymph node enlargement  ABDOMEN: Soft.  No tenderness or masses.    CARDIOVASCULAR: No edema of peripheral extremities  BREASTS: " Symmetrical, no skin changes or visible lesions.  No palpable masses or nipple discharge bilaterally.  PELVIC: Normal external genitalia without lesions.  Normal hair distribution.  Adequate perineal body, normal urethral meatus.  Vagina moist and well rugated without lesions or discharge. No significant cystocele or rectocele.  Uterus and cervix surgically absent. Adnexa without masses or tenderness.      Assessment:     1. Encounter for annual routine gynecological examination        2. Breast cancer screening by mammogram  Mammo Digital Screening Bilat w/ Zain      3. Premature menopause on HRT        4. At high risk for breast cancer        5. Family history of colon cancer in father        6. Encounter for osteoporosis screening in asymptomatic postmenopausal patient  DXA Bone Density Axial Skeleton 1 or more sites      7. Genitourinary syndrome of menopause        8. Atrophy of vagina              Plan:     Clinical breast exam performed.  Pap history reviewed.  No further pap screening indicated given hysterectomy for benign indications.  Mammogram reviewed, annual MRI scheduled.  DEXA ordered due to elevated FRAX.  Colonoscopy per GI.  Continue vaginal estrogen per Dr. Godinez.  Follow up in about 1 year (around 10/11/2025) for annual well woman exam or as needed.    Counseling:     Patient was counseled today on current ASCCP pap guidelines, the recommendation for yearly pelvic exams, healthy diet and exercise routines, breast self awareness and annual mammograms.She is to see her PCP for other health maintenance.     Use of the [email or web address] Patient Portal discussed and encouraged during today's visit.         Natalia Kay MD  [facility]  10/11/2024

## 2024-10-30 ENCOUNTER — PATIENT MESSAGE (OUTPATIENT)
Dept: GASTROENTEROLOGY | Facility: CLINIC | Age: 64
End: 2024-10-30
Payer: COMMERCIAL

## 2024-11-05 ENCOUNTER — HOSPITAL ENCOUNTER (OUTPATIENT)
Dept: RADIOLOGY | Facility: HOSPITAL | Age: 64
Discharge: HOME | End: 2024-11-05
Attending: OBSTETRICS & GYNECOLOGY
Payer: COMMERCIAL

## 2024-11-05 DIAGNOSIS — Z13.820 ENCOUNTER FOR OSTEOPOROSIS SCREENING IN ASYMPTOMATIC POSTMENOPAUSAL PATIENT: ICD-10-CM

## 2024-11-05 DIAGNOSIS — Z78.0 ENCOUNTER FOR OSTEOPOROSIS SCREENING IN ASYMPTOMATIC POSTMENOPAUSAL PATIENT: ICD-10-CM

## 2024-11-05 PROCEDURE — 77080 DXA BONE DENSITY AXIAL: CPT | Mod: TC

## 2024-11-05 PROCEDURE — 77080 DXA BONE DENSITY AXIAL: CPT | Mod: 26,,, | Performed by: INTERNAL MEDICINE

## 2024-11-07 ENCOUNTER — TELEPHONE (OUTPATIENT)
Dept: OBSTETRICS AND GYNECOLOGY | Facility: CLINIC | Age: 64
End: 2024-11-07
Payer: COMMERCIAL

## 2024-11-07 ENCOUNTER — TELEPHONE (OUTPATIENT)
Dept: ENDOSCOPY | Facility: HOSPITAL | Age: 64
End: 2024-11-07
Payer: COMMERCIAL

## 2024-11-07 DIAGNOSIS — Z80.0 FAMILY HISTORY OF COLORECTAL CANCER: Primary | ICD-10-CM

## 2024-11-07 RX ORDER — SODIUM, POTASSIUM,MAG SULFATES 17.5-3.13G
1 SOLUTION, RECONSTITUTED, ORAL ORAL DAILY
Qty: 1 KIT | Refills: 0 | Status: SHIPPED | OUTPATIENT
Start: 2024-11-07 | End: 2024-11-09

## 2024-11-07 NOTE — TELEPHONE ENCOUNTER
----- Message from Natalia Kay MD sent at 11/7/2024 10:19 AM CST -----  Please schedule virtual visit.   AT

## 2024-11-07 NOTE — TELEPHONE ENCOUNTER
Called to get patient scheduled for a virtual appointment to discuss bone density results. No answer. Left message.

## 2024-11-14 ENCOUNTER — ANESTHESIA EVENT (OUTPATIENT)
Dept: ENDOSCOPY | Facility: HOSPITAL | Age: 64
End: 2024-11-14
Payer: COMMERCIAL

## 2024-11-14 ENCOUNTER — HOSPITAL ENCOUNTER (OUTPATIENT)
Facility: HOSPITAL | Age: 64
Discharge: HOME | End: 2024-11-14
Attending: INTERNAL MEDICINE | Admitting: INTERNAL MEDICINE
Payer: COMMERCIAL

## 2024-11-14 ENCOUNTER — ANESTHESIA (OUTPATIENT)
Dept: ENDOSCOPY | Facility: HOSPITAL | Age: 64
End: 2024-11-14
Payer: COMMERCIAL

## 2024-11-14 VITALS
HEIGHT: 64 IN | RESPIRATION RATE: 16 BRPM | OXYGEN SATURATION: 99 % | DIASTOLIC BLOOD PRESSURE: 78 MMHG | WEIGHT: 130 LBS | TEMPERATURE: 98 F | HEART RATE: 81 BPM | BODY MASS INDEX: 22.2 KG/M2 | SYSTOLIC BLOOD PRESSURE: 124 MMHG

## 2024-11-14 DIAGNOSIS — Z80.0 FAMILY HISTORY OF COLON CANCER: ICD-10-CM

## 2024-11-14 PROCEDURE — C1726 CATH, BAL DIL, NON-VASCULAR: HCPCS | Performed by: INTERNAL MEDICINE

## 2024-11-14 PROCEDURE — 88305 TISSUE EXAM BY PATHOLOGIST: CPT | Performed by: PATHOLOGY

## 2024-11-14 PROCEDURE — 63600175 PHARM REV CODE 636 W HCPCS: Performed by: NURSE ANESTHETIST, CERTIFIED REGISTERED

## 2024-11-14 PROCEDURE — 37000009 HC ANESTHESIA EA ADD 15 MINS: Performed by: INTERNAL MEDICINE

## 2024-11-14 PROCEDURE — 88305 TISSUE EXAM BY PATHOLOGIST: CPT | Mod: 26,,, | Performed by: PATHOLOGY

## 2024-11-14 PROCEDURE — 37000008 HC ANESTHESIA 1ST 15 MINUTES: Performed by: INTERNAL MEDICINE

## 2024-11-14 PROCEDURE — 45380 COLONOSCOPY AND BIOPSY: CPT | Mod: 33 | Performed by: INTERNAL MEDICINE

## 2024-11-14 PROCEDURE — 27201012 HC FORCEPS, HOT/COLD, DISP: Performed by: INTERNAL MEDICINE

## 2024-11-14 PROCEDURE — 43249 ESOPH EGD DILATION <30 MM: CPT | Mod: 51,,, | Performed by: INTERNAL MEDICINE

## 2024-11-14 PROCEDURE — 45380 COLONOSCOPY AND BIOPSY: CPT | Mod: 33,,, | Performed by: INTERNAL MEDICINE

## 2024-11-14 RX ORDER — PROPOFOL 10 MG/ML
VIAL (ML) INTRAVENOUS CONTINUOUS PRN
Status: DISCONTINUED | OUTPATIENT
Start: 2024-11-14 | End: 2024-11-14

## 2024-11-14 RX ORDER — SODIUM CHLORIDE 9 MG/ML
INJECTION, SOLUTION INTRAVENOUS CONTINUOUS
Status: DISCONTINUED | OUTPATIENT
Start: 2024-11-14 | End: 2024-11-14 | Stop reason: HOSPADM

## 2024-11-14 RX ORDER — LIDOCAINE HYDROCHLORIDE 20 MG/ML
INJECTION INTRAVENOUS
Status: DISCONTINUED | OUTPATIENT
Start: 2024-11-14 | End: 2024-11-14

## 2024-11-14 RX ADMIN — PROPOFOL 30 MG: 10 INJECTION, EMULSION INTRAVENOUS at 07:11

## 2024-11-14 RX ADMIN — PROPOFOL 200 MCG/KG/MIN: 10 INJECTION, EMULSION INTRAVENOUS at 07:11

## 2024-11-14 RX ADMIN — PROPOFOL 20 MG: 10 INJECTION, EMULSION INTRAVENOUS at 07:11

## 2024-11-14 RX ADMIN — PROPOFOL 50 MG: 10 INJECTION, EMULSION INTRAVENOUS at 07:11

## 2024-11-14 RX ADMIN — LIDOCAINE HYDROCHLORIDE 100 MG: 20 INJECTION INTRAVENOUS at 07:11

## 2024-11-14 NOTE — ANESTHESIA POSTPROCEDURE EVALUATION
Anesthesia Post Evaluation    Patient: Xochitl Weiss    Procedure(s) Performed: Procedure(s) (LRB):  EGD (ESOPHAGOGASTRODUODENOSCOPY) (N/A)  COLONOSCOPY (N/A)    Final Anesthesia Type: general      Patient location during evaluation: PACU  Patient participation: Yes- Able to Participate  Level of consciousness: awake and alert  Post-procedure vital signs: reviewed and stable  Pain management: adequate  Airway patency: patent    PONV status: None or treated.  Anesthetic complications: no      Cardiovascular status: hemodynamically stable  Respiratory status: spontaneous ventilation  Hydration status: euvolemic  Follow-up not needed.          Vitals Value Taken Time   /78 11/14/24 0844   Temp 36.4 °C (97.5 °F) 11/14/24 0815   Pulse 81 11/14/24 0844   Resp 16 11/14/24 0815   SpO2 99 % 11/14/24 0844         Event Time   Out of Recovery 08:51:35         Pain/Laura Score: Laura Score: 9 (11/14/2024  8:15 AM)

## 2024-11-14 NOTE — PROVATION PATIENT INSTRUCTIONS
Discharge Summary/Instructions after an Endoscopic Procedure  Patient Name: Xochitl Weiss  Patient MRN: [id number]  Patient YOB: 1960 Thursday, November 14, 2024  Torey Macias MD  Dear patient,  As a result of recent federal legislation (The Federal Cures Act), you may   receive lab or pathology results from your procedure in your [facility]   account before your physician is able to contact you. Your physician or   their representative will relay the results to you with their   recommendations at their soonest availability.  Thank you,  RESTRICTIONS:  During your procedure today, you received medications for sedation.  These   medications may affect your judgment, balance and coordination.  Therefore,   for 24 hours, you have the following restrictions:   - DO NOT drive a car, operate machinery, make legal/financial decisions,   sign important papers or drink alcohol.    ACTIVITY:  Today: no heavy lifting, straining or running due to procedural   sedation/anesthesia.  The following day: return to full activity including work.  DIET:  Eat and drink normally unless instructed otherwise.     TREATMENT FOR COMMON SIDE EFFECTS:  - Mild abdominal pain, nausea, belching, bloating or excessive gas:  rest,   eat lightly and use a heating pad.  - Sore Throat: treat with throat lozenges and/or gargle with warm salt   water.  - Because air was used during the procedure, expelling large amounts of air   from your rectum or belching is normal.  - If a bowel prep was taken, you may not have a bowel movement for 1-3 days.    This is normal.  SYMPTOMS TO WATCH FOR AND REPORT TO YOUR PHYSICIAN:  1. Abdominal pain or bloating, other than gas cramps.  2. Chest pain.  3. Back pain.  4. Signs of infection such as: chills or fever occurring within 24 hours   after the procedure.  5. Rectal bleeding, which would show as bright red, maroon, or black stools.   (A tablespoon of blood from the rectum is not serious, especially  if   hemorrhoids are present.)  6. Vomiting.  7. Weakness or dizziness.  GO DIRECTLY TO THE NEAREST EMERGENCY ROOM IF YOU HAVE ANY OF THE FOLLOWING:      Difficulty breathing              Chills and/or fever over 101 F   Persistent vomiting and/or vomiting blood   Severe abdominal pain   Severe chest pain   Black, tarry stools   Bleeding- more than one tablespoon   Any other symptom or condition that you feel may need urgent attention  Your doctor recommends these additional instructions:  If any biopsies were taken, your doctors clinic will contact you in 1 to 2   weeks with any results.  - Discharge patient to home.   - Await pathology results.   - Telephone endoscopist for pathology results in 3 weeks.   - Repeat colonoscopy in 3 years for surveillance.   - The findings and recommendations were discussed with the patient.  For questions, problems or results please call your physician - Torey Macias MD at Work:  [phone].  [facility], EMERGENCY ROOM PHONE NUMBER: [phone]  IF A COMPLICATION OR EMERGENCY SITUATION ARISES AND YOU ARE UNABLE TO REACH   YOUR PHYSICIAN - GO DIRECTLY TO THE EMERGENCY ROOM.  Torey Macias MD  11/14/2024 8:16:32 AM  This report has been verified and signed electronically.  Dear patient,  As a result of recent federal legislation (The Federal Cures Act), you may   receive lab or pathology results from your procedure in your [facility]   account before your physician is able to contact you. Your physician or   their representative will relay the results to you with their   recommendations at their soonest availability.  Thank you,  PROVATION

## 2024-11-14 NOTE — PROVATION PATIENT INSTRUCTIONS
Discharge Summary/Instructions after an Endoscopic Procedure  Patient Name: Xochitl Weiss  Patient MRN: [id number]  Patient YOB: 1960 Thursday, November 14, 2024  Torey Macias MD  Dear patient,  As a result of recent federal legislation (The Federal Cures Act), you may   receive lab or pathology results from your procedure in your [facility]   account before your physician is able to contact you. Your physician or   their representative will relay the results to you with their   recommendations at their soonest availability.  Thank you,  RESTRICTIONS:  During your procedure today, you received medications for sedation.  These   medications may affect your judgment, balance and coordination.  Therefore,   for 24 hours, you have the following restrictions:   - DO NOT drive a car, operate machinery, make legal/financial decisions,   sign important papers or drink alcohol.    ACTIVITY:  Today: no heavy lifting, straining or running due to procedural   sedation/anesthesia.  The following day: return to full activity including work.  DIET:  Eat and drink normally unless instructed otherwise.     TREATMENT FOR COMMON SIDE EFFECTS:  - Mild abdominal pain, nausea, belching, bloating or excessive gas:  rest,   eat lightly and use a heating pad.  - Sore Throat: treat with throat lozenges and/or gargle with warm salt   water.  - Because air was used during the procedure, expelling large amounts of air   from your rectum or belching is normal.  - If a bowel prep was taken, you may not have a bowel movement for 1-3 days.    This is normal.  SYMPTOMS TO WATCH FOR AND REPORT TO YOUR PHYSICIAN:  1. Abdominal pain or bloating, other than gas cramps.  2. Chest pain.  3. Back pain.  4. Signs of infection such as: chills or fever occurring within 24 hours   after the procedure.  5. Rectal bleeding, which would show as bright red, maroon, or black stools.   (A tablespoon of blood from the rectum is not serious, especially  if   hemorrhoids are present.)  6. Vomiting.  7. Weakness or dizziness.  GO DIRECTLY TO THE NEAREST EMERGENCY ROOM IF YOU HAVE ANY OF THE FOLLOWING:      Difficulty breathing              Chills and/or fever over 101 F   Persistent vomiting and/or vomiting blood   Severe abdominal pain   Severe chest pain   Black, tarry stools   Bleeding- more than one tablespoon   Any other symptom or condition that you feel may need urgent attention  Your doctor recommends these additional instructions:  If any biopsies were taken, your doctors clinic will contact you in 1 to 2   weeks with any results.  - Discharge patient to home.   - Follow an antireflux regimen indefinitely.   - Use Protonix 20 mg once daily (or any other full strength proton pump   inhibitor) - best taken 45 minutes before your first protein containing   meal.   - Await pathology results.   - Telephone endoscopist for pathology results in 3 weeks.   - Repeat upper endoscopy in 3 years for surveillance based on pathology   results.   - Mechanical soft diet for 3 days, then advance as tolerated to advance diet   as tolerated.   - Return to GI clinic at the next available appointment.   - The findings and recommendations were discussed with the patient.  For questions, problems or results please call your physician - Torey Macias MD at Work:  [phone].  [facility], EMERGENCY ROOM PHONE NUMBER: [phone]  IF A COMPLICATION OR EMERGENCY SITUATION ARISES AND YOU ARE UNABLE TO REACH   YOUR PHYSICIAN - GO DIRECTLY TO THE EMERGENCY ROOM.  Torey Macias MD  11/14/2024 8:19:19 AM  This report has been verified and signed electronically.  Dear patient,  As a result of recent federal legislation (The Federal Cures Act), you may   receive lab or pathology results from your procedure in your [facility]   account before your physician is able to contact you. Your physician or   their representative will relay the results to you with their    recommendations at their soonest availability.  Thank you,  PROVATION

## 2024-11-14 NOTE — H&P
[facility]  History & Physical    Subjective:      Chief Complaint/Reason for Admission:     Procedure:  EGD and colonoscopy     Diagnosis: Familial colorectal Cancer Type X, Family history of colon cancer (Mom and Dad)  gastric intestinal metaplasia, GERD       Xochitl Weiss is a 64 y.o. female.    Past Medical History:   Diagnosis Date    BRCA negative     Colon polyp     Diverticula of colon     Diverticulitis     Family history of breast cancer     mother    Family history of colon cancer     2 family members over age 60    General anesthetics causing adverse effect in therapeutic use     per patient report slow to awaken    Genetic testing     negative Integrated BRACAnalysis    GERD (gastroesophageal reflux disease)     Hydronephrosis 2015    Kidney stones     Mild hyperlipidemia     Prediabetes 2023    Resolved with wt loss and diet changes     Past Surgical History:   Procedure Laterality Date    breast augmentation      BREAST CYST EXCISION Left 2021    BREAST SURGERY      implant removal in 2020     SECTION      x 2    COLONOSCOPY      COLONOSCOPY N/A 3/8/2017    Procedure: COLONOSCOPY;  Surgeon: Torey Macias MD;  [facility]  Service: Endoscopy;  Laterality: N/A;    COLONOSCOPY N/A 2019    Procedure: COLONOSCOPY;  Surgeon: Torey Macias MD;  [facility]  Service: Endoscopy;  Laterality: N/A;    COLONOSCOPY N/A 3/31/2022    Procedure: COLONOSCOPY;  Surgeon: Torey Macias MD;  [facility]  Service: Endoscopy;  Laterality: N/A;   covid test 3/28 @ [place]; instructions to [facility]    ESOPHAGOGASTRODUODENOSCOPY N/A 10/26/2021    Procedure: EGD (ESOPHAGOGASTRODUODENOSCOPY);  Surgeon: Torey Macias MD;  [facility]  Service: Endoscopy;  Laterality: N/A;  Covid test 10/23 [place], instructions [facility] to [facility]    EYE SURGERY      blephroplasty     HYSTERECTOMY  1995    AUB    OOPHORECTOMY      TONSILLECTOMY  1979    TRIGGER FINGER RELEASE Right 03/2017    thumb and 3rd digit    TUBAL LIGATION       Social History     Tobacco Use    Smoking status: Never    Smokeless tobacco: Never   Substance Use Topics    Alcohol use: Yes     Alcohol/week: 4.0 standard drinks of alcohol     Types: 4 Glasses of wine per week     Comment: socially    Drug use: No       Facility-Administered Medications Prior to Admission   Medication    doxycycline tablet 100 mg    LIDOcaine HCl 2% urojet     PTA Medications   Medication Sig    estradioL (ESTRACE) 0.01 % (0.1 mg/gram) vaginal cream Place 1 g vaginally 3 (three) times a week.    pantoprazole (PROTONIX) 20 MG tablet TAKE 1 TABLET(20 MG) BY MOUTH BEFORE BREAKFAST     Review of patient's allergies indicates:  No Known Allergies     Review of Systems   Constitutional:  Negative for fever.       Objective:      Vital Signs (Most Recent)       Vital Signs Range (Last 24H):       Physical Exam  Neurological:      Mental Status: She is alert and oriented to person, place, and time.           Assessment:      Procedure:  EGD and colonoscopy     Diagnosis: Familial colorectal Cancer Type X, Family history of colon cancer (Mom and Dad)  gastric intestinal metaplasia, GERD         Plan:    Procedure:  EGD and colonoscopy     Diagnosis: Familial colorectal Cancer Type X, Family history of colon cancer (Mom and Dad)  gastric intestinal metaplasia, GERD

## 2024-11-14 NOTE — TRANSFER OF CARE
"Anesthesia Transfer of Care Note    Patient: Xochitl Weiss    Procedure(s) Performed: Procedure(s) (LRB):  EGD (ESOPHAGOGASTRODUODENOSCOPY) (N/A)  COLONOSCOPY (N/A)    Patient location: PACU    Anesthesia Type: general    Transport from OR: Transported from OR on 6-10 L/min O2 by face mask with adequate spontaneous ventilation    Post pain: adequate analgesia    Post assessment: no apparent anesthetic complications and tolerated procedure well    Post vital signs: stable    Level of consciousness: sedated    Nausea/Vomiting: no nausea/vomiting    Complications: none    Transfer of care protocol was followed      Last vitals: Visit Vitals  /74 (BP Location: Left arm, Patient Position: Lying)   Pulse 92   Temp 36.8 °C (98.2 °F) (Temporal)   Resp 18   Ht 5' 4" (1.626 m)   Wt 59 kg (130 lb)   SpO2 97%   Breastfeeding No   BMI 22.31 kg/m²     "

## 2024-11-14 NOTE — PLAN OF CARE
Post procedure, polyp and soft foods information given . Discharge instruction and procedure handouts given. Pt verbalized understanding.

## 2024-11-14 NOTE — ANESTHESIA PREPROCEDURE EVALUATION
[facility]  Anesthesia Pre-Operative Evaluation       Patient Name: Xochitl Weiss  YOB: 1960  MRN: [id number]  [facility]: [id number]      Code Status: Prior   Date of Procedure: 11/14/2024  Procedure: Procedure(s) (LRB):  EGD (ESOPHAGOGASTRODUODENOSCOPY) (N/A)  COLONOSCOPY (N/A)  Anesthesia: Choice  Pre-Operative Diagnosis: Final diagnoses:  [Z80.0] Family history of colon cancer  Proceduralist/Surgeons and Role:     * Torey Macias MD - Primary    SUBJECTIVE:   Xochitl Weiss is a 64 y.o. female w/ a significant PMHx listed below.    Patient now presents for the above procedure(s). Pt appropriately NPO.     LDA:       Anesthesia Evaluation      Airway   Mallampati: II  TM distance: Normal  Neck ROM: Normal ROM  Dental    (+) Intact    Pulmonary    Cardiovascular   Exercise tolerance: good    ECG reviewed  Rate: Normal    Neuro/Psych      GI/Hepatic/Renal    (+) bowel prep    Endo/Other    Abdominal                     ALLERGIES:   Review of patient's allergies indicates:  No Known Allergies  MEDICATIONS:     Current Outpatient Medications on File Prior to Encounter   Medication Sig Dispense Refill Last Dose/Taking    estradioL (ESTRACE) 0.01 % (0.1 mg/gram) vaginal cream Place 1 g vaginally 3 (three) times a week. 42.5 g 3     pantoprazole (PROTONIX) 20 MG tablet TAKE 1 TABLET(20 MG) BY MOUTH BEFORE BREAKFAST 90 tablet 3      Current Facility-Administered Medications   Medication Dose Route Frequency Provider Last Rate Last Admin    0.9%  NaCl infusion   Intravenous Continuous Torey Macias MD              History:   There are no hospital problems to display for this patient.    Patient Active Problem List   Diagnosis    Encounter for long-term (current) use of other medications    Family history of colon cancer    GERD (gastroesophageal reflux disease)    Family history of breast cancer    Diverticulitis    Premature menopause on HRT    Hemorrhoids, internal    History of  diverticulitis    Food impaction of esophagus    Esophageal foreign body    Dysphagia    At high risk for breast cancer    Family history of colon cancer in father    Reflux esophagitis    History of nephrolithiasis      Past Medical History:   Diagnosis Date    BRCA negative     Colon polyp     Diverticula of colon     Diverticulitis     Family history of breast cancer     mother    Family history of colon cancer     2 family members over age 60    General anesthetics causing adverse effect in therapeutic use     per patient report slow to awaken    Genetic testing     negative Integrated BRACAnalysis    GERD (gastroesophageal reflux disease)     Hydronephrosis 2015    Kidney stones     Mild hyperlipidemia     Prediabetes 2023    Resolved with wt loss and diet changes     Past Surgical History:   Procedure Laterality Date    breast augmentation      BREAST CYST EXCISION Left 2021    BREAST SURGERY      implant removal in 2020     SECTION      x 2    COLONOSCOPY      COLONOSCOPY N/A 3/8/2017    Procedure: COLONOSCOPY;  Surgeon: Torey Macias MD;  Location: [facility] ENDO (4TH FLR);  Service: Endoscopy;  Laterality: N/A;    COLONOSCOPY N/A 2019    Procedure: COLONOSCOPY;  Surgeon: Torey Macias MD;  Location: [facility] ENDO (4TH FLR);  Service: Endoscopy;  Laterality: N/A;    COLONOSCOPY N/A 3/31/2022    Procedure: COLONOSCOPY;  Surgeon: Torey Macias MD;  Location: [facility] ENDO (4TH FLR);  Service: Endoscopy;  Laterality: N/A;   covid test 3/28 @ [place]; instructions to portal-st    ESOPHAGOGASTRODUODENOSCOPY N/A 10/26/2021    Procedure: EGD (ESOPHAGOGASTRODUODENOSCOPY);  Surgeon: Torey Macias MD;  Location: [facility] ENDO (4TH FLR);  Service: Endoscopy;  Laterality: N/A;  Covid test 10/23 [place], instructions sent to [facility]    EYE SURGERY      blephroplasty    HYSTERECTOMY      AUB    OOPHORECTOMY      TONSILLECTOMY  1979    TRIGGER FINGER RELEASE Right 2017     "thumb and 3rd digit    TUBAL LIGATION       Alcohol use:    Social History     Substance and Sexual Activity   Alcohol Use Yes    Alcohol/week: 4.0 standard drinks of alcohol    Types: 4 Glasses of wine per week    Comment: socially          OBJECTIVE:   Last 3 sets of Vitals        9/17/2024     5:16 PM 9/19/2024     2:48 PM 10/11/2024    10:50 AM   Vitals - 1 value per visit   SYSTOLIC   114   DIASTOLIC   70   Weight (lb) 132 131 135.36   Weight (kg) 59.875 59.421 61.4   Height 5' 4" (1.626 m) 5' 4" (1.626 m) 5' 4" (1.626 m)   BMI (Calculated) 22.6 22.5 23.2   Pain Score Zero  Zero       Vital Signs (Most Recent):    Vital Signs Range (Last 24H):        No intake or output data in the 24 hours ending 11/14/24 0713    There is no height or weight on file to calculate BMI.     Significant Labs:  Lab Results   Component Value Date    WBC 5.45 07/12/2024    HGB 14.4 07/12/2024    HCT 44.7 07/12/2024     07/12/2024    CHOL 227 (H) 07/18/2023    TRIG 72 07/18/2023    HDL 71 07/18/2023    ALT 14 07/12/2024    AST 22 07/12/2024     07/12/2024    K 4.8 07/12/2024     07/12/2024    CREATININE 0.9 07/12/2024    BUN 15 07/12/2024    CO2 24 07/12/2024    TSH 4.227 (H) 07/12/2024    INR 1.0 05/29/2017    HGBA1C 5.7 (H) 07/12/2024     No results found for: "PREGTESTUR", "PREGSERUM", "HCG", "HCGQUANT"   No LMP recorded. Patient has had a hysterectomy.    EKG:       TTE:  No results found for this or any previous visit.  No results found for: "EF"  No results found for this or any previous visit.  LANA:  No results found for this or any previous visit.  Stress Test:  No results found for this or any previous visit.     LHC:  No results found for this or any previous visit.     PFT:  No results found for: "FEV1", "FVC", "MMF6KWB", "TLC", "DLCO"     ASSESSMENT/PLAN:                                                                                                                11/14/2024  Xochitl Weiss is a 64 " y.o., female.      Pre-op Assessment    I have reviewed the Patient Summary Reports.     I have reviewed the Nursing Notes. I have reviewed the NPO Status.   I have reviewed the Medications.     Review of Systems  Anesthesia Hx:  No problems with previous Anesthesia                Social:  Non-Smoker, Social Alcohol Use       Hematology/Oncology:  Hematology Normal   Oncology Normal                                   EENT/Dental:  EENT/Dental Normal           Cardiovascular:  Cardiovascular Normal Exercise tolerance: good                 ECG has been reviewed.                            Pulmonary:  Pulmonary Normal                       Renal/:  Renal/ Normal                 Hepatic/GI:  Hepatic/GI Normal Bowel Prep.                   Musculoskeletal:  Musculoskeletal Normal                Neurological:  Neurology Normal                                      Endocrine:  Endocrine Normal            Dermatological:  Skin Normal    Psych:  Psychiatric Normal                    Physical Exam  General: Well nourished, Cooperative, Alert and Oriented    Airway:  Mallampati: II   Mouth Opening: Normal  TM Distance: Normal  Tongue: Normal  Neck ROM: Normal ROM    Dental:  Intact    Chest/Lungs:  Clear to auscultation, Normal Respiratory Rate    Heart:  Rate: Normal  Rhythm: Regular Rhythm  Sounds: Normal        Anesthesia Plan  Type of Anesthesia, risks & benefits discussed:    Anesthesia Type: Gen Natural Airway  Intra-op Monitoring Plan: Standard ASA Monitors  Induction:  IV  Informed Consent: Informed consent signed with the Patient and all parties understand the risks and agree with anesthesia plan.  All questions answered.   ASA Score: 2  Day of Surgery Review of History & Physical: H&P Update referred to the surgeon/provider.    Ready For Surgery From Anesthesia Perspective.     .

## 2024-11-18 LAB
FINAL PATHOLOGIC DIAGNOSIS: NORMAL
GROSS: NORMAL
Lab: NORMAL

## 2024-11-23 ENCOUNTER — PATIENT MESSAGE (OUTPATIENT)
Dept: GASTROENTEROLOGY | Facility: CLINIC | Age: 64
End: 2024-11-23
Payer: COMMERCIAL

## 2024-11-23 NOTE — PROGRESS NOTES
Xochitl your EGD colonoscopy pathology was completely benign recommend your next surveillance EGD and colonoscopy in 3 years    1. Stomach, distal antrum, lesser curvature, biopsy:  Antral type gastric mucosa with mild chronic inflammation.    No H.pylori identified on H&E stain slide.    No intestinal metaplasia or dysplasia.    2. Stomach, distal antrum, greater curvature, biopsy:  Antral type gastric mucosa with mild chronic inflammation.    No H.pylori identified on H&E stain slide.    No intestinal metaplasia or dysplasia.    3. Stomach, incisura, lesser curvature, biopsy:  Antral and oxyntic mucosa with mild chronic inflammation.    No H.pylori identified on H&E stain slide.    No intestinal metaplasia or dysplasia.    4. Stomach, proximal corpus, lesser curvature, biopsy:  Oxyntic mucosa with mild chronic inflammation.    No H.pylori identified on H&E stain slide.    No intestinal metaplasia or dysplasia.    5. Stomach, proximal corpus, greater curvature, biopsy:  Oxyntic mucosa with mild chronic inflammation.    No H.pylori identified on H&E stain slide.    No intestinal metaplasia or dysplasia.    6. Cecum, polyp, biopsy:  Colonic mucosa with lymphoid aggregate.   Comment: Interp By Alex Christine M.D., Signed on 11/18/2024

## 2024-11-26 ENCOUNTER — OFFICE VISIT (OUTPATIENT)
Dept: OBSTETRICS AND GYNECOLOGY | Facility: CLINIC | Age: 64
End: 2024-11-26
Payer: COMMERCIAL

## 2024-11-26 DIAGNOSIS — E03.8 SUBCLINICAL HYPOTHYROIDISM: ICD-10-CM

## 2024-11-26 DIAGNOSIS — M85.80 OSTEOPENIA AFTER MENOPAUSE: Primary | ICD-10-CM

## 2024-11-26 DIAGNOSIS — Z78.0 OSTEOPENIA AFTER MENOPAUSE: Primary | ICD-10-CM

## 2024-11-26 PROCEDURE — 1160F RVW MEDS BY RX/DR IN RCRD: CPT | Mod: CPTII,95,, | Performed by: OBSTETRICS & GYNECOLOGY

## 2024-11-26 PROCEDURE — 99213 OFFICE O/P EST LOW 20 MIN: CPT | Mod: 95,,, | Performed by: OBSTETRICS & GYNECOLOGY

## 2024-11-26 PROCEDURE — 1159F MED LIST DOCD IN RCRD: CPT | Mod: CPTII,95,, | Performed by: OBSTETRICS & GYNECOLOGY

## 2024-11-26 PROCEDURE — 3044F HG A1C LEVEL LT 7.0%: CPT | Mod: CPTII,95,, | Performed by: OBSTETRICS & GYNECOLOGY

## 2024-11-26 NOTE — PATIENT INSTRUCTIONS
[email or web address]    [email or web address]

## 2024-11-26 NOTE — PROGRESS NOTES
The patient location is: home  The chief complaint leading to consultation is: results    Visit type: audiovisual    Face to Face time with patient: 18 minutes  22 minutes of total time spent on the encounter, which includes face to face time and non-face to face time preparing to see the patient (eg, review of tests), Obtaining and/or reviewing separately obtained history, Documenting clinical information in the electronic or other health record, Independently interpreting results (not separately reported) and communicating results to the patient/family/caregiver, or Care coordination (not separately reported).         Each patient to whom he or she provides medical services by telemedicine is:  (1) informed of the relationship between the physician and patient and the respective role of any other health care provider with respect to management of the patient; and (2) notified that he or she may decline to receive medical services by telemedicine and may withdraw from such care at any time.    Notes:     Subjective:       Patient ID: Xochitl Weiss is a 64 y.o. female.    Chief Complaint: DEXA results      History of Present Illness  63 yo  presents to review DEXA results.  S/p Hysterectomy/BSO at age 34.  Was maintained on HRT for many years but discontinued due to concerns secondary to strong family history of cancer. Both parents have had hip fractures.     Patient Active Problem List   Diagnosis    Encounter for long-term (current) use of other medications    Family history of colon cancer    GERD (gastroesophageal reflux disease)    Family history of breast cancer    Diverticulitis    Premature menopause on HRT    Hemorrhoids, internal    History of diverticulitis    Food impaction of esophagus    Esophageal foreign body    Dysphagia    At high risk for breast cancer    Family history of colon cancer in father    Reflux esophagitis    History of nephrolithiasis       Past Medical History:   Diagnosis  Date    BRCA negative     Colon polyp     Diverticula of colon     Diverticulitis     Family history of breast cancer     mother    Family history of colon cancer     2 family members over age 60    General anesthetics causing adverse effect in therapeutic use     per patient report slow to awaken    Genetic testing     negative Integrated BRACAnalysis    GERD (gastroesophageal reflux disease)     Hydronephrosis 2015    Kidney stones     Mild hyperlipidemia     Prediabetes 2023    Resolved with wt loss and diet changes       Past Surgical History:   Procedure Laterality Date    breast augmentation      BREAST CYST EXCISION Left 2021    BREAST SURGERY      implant removal in 2020     SECTION      x 2    COLONOSCOPY      COLONOSCOPY N/A 3/8/2017    Procedure: COLONOSCOPY;  Surgeon: Torey Macias MD;  Location: [facility] (4TH FLR);  Service: Endoscopy;  Laterality: N/A;    COLONOSCOPY N/A 2019    Procedure: COLONOSCOPY;  Surgeon: Torey Macias MD;  Location: [facility] (4TH FLR);  Service: Endoscopy;  Laterality: N/A;    COLONOSCOPY N/A 3/31/2022    Procedure: COLONOSCOPY;  Surgeon: Torey Macias MD;  Location: [facility] (4TH FLR);  Service: Endoscopy;  Laterality: N/A;   covid test 3/28 @ [place]; instructions to portal-st    COLONOSCOPY N/A 2024    Procedure: COLONOSCOPY;  Surgeon: Torey Macias MD;  Location: [facility] (4TH FLR);  Service: Endoscopy;  Laterality: N/A;    ESOPHAGOGASTRODUODENOSCOPY N/A 10/26/2021    Procedure: EGD (ESOPHAGOGASTRODUODENOSCOPY);  Surgeon: Torey Macias MD;  Location: [facility] (4TH FLR);  Service: Endoscopy;  Laterality: N/A;  Covid test 10/23 [place], instructions [facility] to [facility]    ESOPHAGOGASTRODUODENOSCOPY N/A 2024    Procedure: EGD (ESOPHAGOGASTRODUODENOSCOPY);  Surgeon: Torey Macias MD;  Location: [facility] (4TH FLR);  Service: Endoscopy;  Laterality: N/A;  prep instructions sent to pt [facility]  pt/ suprep   pt r/s suprep instructions to pt portal.cf   precall complete, pt with understanding. SS    EYE SURGERY      blephroplasty    HYSTERECTOMY      AUB    OOPHORECTOMY      TONSILLECTOMY  1979    TRIGGER FINGER RELEASE Right 2017    thumb and 3rd digit    TUBAL LIGATION         OB History    Para Term  AB Living   2 2 2     2   SAB IAB Ectopic Multiple Live Births                  # Outcome Date GA Lbr [place]/2nd Weight Sex Type Anes PTL Lv   2 Term            1 Term                No LMP recorded. Patient has had a hysterectomy.   Date of Last Pap: No result found    Review of Systems  Review of Systems   Constitutional:  Negative for activity change, fatigue and unexpected weight change.   Respiratory:  Negative for shortness of breath.    Cardiovascular:  Negative for chest pain.   Gastrointestinal:  Negative for abdominal pain, blood in stool, constipation and diarrhea.   Endocrine: Negative for cold intolerance and heat intolerance.   Genitourinary:  Negative for bladder incontinence, dyspareunia, dysuria, frequency, hot flashes, vaginal bleeding and vaginal dryness.   Integumentary:  Negative for breast mass, breast discharge and breast tenderness.   Psychiatric/Behavioral:  Negative for dysphoric mood. The patient is not nervous/anxious.    Breast: Negative for mass and tenderness       Objective:   There were no vitals taken for this visit.  There is no height or weight on file to calculate BMI.    APPEARANCE: Well nourished, well developed, in no acute distress.  PSYCH: Appropriate mood and affect.  SKIN: No acne or hirsutism     Lab Results   Component Value Date    WBC 5.45 2024    HGB 14.4 2024    HCT 44.7 2024    MCV 90 2024     2024     CMP  Sodium   Date Value Ref Range Status   2024 141 136 - 145 mmol/L Final     Potassium   Date Value Ref Range Status   2024 4.8 3.5 - 5.1 mmol/L Final     Chloride   Date Value Ref  Range Status   07/12/2024 107 95 - 110 mmol/L Final     CO2   Date Value Ref Range Status   07/12/2024 24 23 - 29 mmol/L Final     Glucose   Date Value Ref Range Status   07/12/2024 96 70 - 110 mg/dL Final     BUN   Date Value Ref Range Status   07/12/2024 15 8 - 23 mg/dL Final     Creatinine   Date Value Ref Range Status   07/12/2024 0.9 0.5 - 1.4 mg/dL Final     Calcium   Date Value Ref Range Status   07/12/2024 9.8 8.7 - 10.5 mg/dL Final     Total Protein   Date Value Ref Range Status   07/12/2024 7.1 6.0 - 8.4 g/dL Final     Albumin   Date Value Ref Range Status   07/12/2024 4.0 3.5 - 5.2 g/dL Final     Total Bilirubin   Date Value Ref Range Status   07/12/2024 0.4 0.1 - 1.0 mg/dL Final     Comment:     For infants and newborns, interpretation of results should be based  on gestational age, weight and in agreement with clinical  observations.    Premature Infant recommended reference ranges:  Up to 24 hours.............<8.0 mg/dL  Up to 48 hours............<12.0 mg/dL  3-5 days..................<15.0 mg/dL  6-29 days.................<15.0 mg/dL       Alkaline Phosphatase   Date Value Ref Range Status   07/12/2024 74 55 - 135 U/L Final     AST   Date Value Ref Range Status   07/12/2024 22 10 - 40 U/L Final     ALT   Date Value Ref Range Status   07/12/2024 14 10 - 44 U/L Final     Anion Gap   Date Value Ref Range Status   07/12/2024 10 8 - 16 mmol/L Final     eGFR   Date Value Ref Range Status   07/12/2024 >60.0 >60 mL/min/1.73 m^2 Final     Lab Results   Component Value Date    TSH 4.227 (H) 07/12/2024    FREET4 1.00 07/12/2024       DXA Bone Density Axial Skeleton 1 or more sites  Order: [id number]  Status: Final result       Visible to patient: Yes (seen)       Next appt: 03/11/2025 at 12:00 PM in Radiology ([facility] MRI1)       Dx: Encounter for osteoporosis screening ...    1 Result Note       1 Patient Communication       1 Follow-up Encounter       1  Topic  Details    Reading Physician Reading Date Result  Priority   Maritza Crouch MD  [phone]  [phone] 11/7/2024      Narrative & Impression  EXAMINATION:  DXA BONE DENSITY AXIAL SKELETON 1 OR MORE SITES     CLINICAL HISTORY:  Encounter for screening for osteoporosis.62 y/o female with no reported history of fractures. No reported treatments for osteoporosis. She had surgical menopause at 33 y/o.  Pt's Mother/Father had a hip fractures.  She is taking HRT and Multivitamins.  She has a history of GERD and Kidney Stones.  She exercise regularly and does not smoke.     TECHNIQUE:  DXA specification: [facility] (S/N [id number])     Bone Mineral Density scanning was performed over the hip and lumbar spine.     Review of the images confirms satisfactory positioning and technique.     COMPARISON:  Comparison cannot be made to the study done on 10/31/2011/[facility].     FINDINGS:  Lumbar spine (L1-L4):               T-score is -1.3, and Z-score is 0.2.     Femoral neck:                          T-score is -1.5, and Z-score is -0.1.     Total hip:                                T-score is -0.7, and Z-score is 0.3.     Distal 1/3 radius:                      Not applicable        Fracture Risk (FRAX)     16% risk of a major osteoporotic fracture in the next 10 years.     0.8% risk of hip fracture in the next 10 years.     Impression:     *Low bone mass (Osteopenia); FRAX calculations do not support treatment as osteoporosis.     RECOMMENDATIONS:  *Daily calcium intake 7824-6800 mg, dietary sources preferred; Vitamin D 4510-6297 IU daily.  *Weight bearing exercise and fall precautions.  *Repeat BMD in 2 years.        Electronically signed by:aMritza Crouch MD  Date:                                            11/07/2024  Time:                                           06:41        Exam Ended: 11/05/24 13:00 CST Last Resulted: 11/07/24 06:41 CST    [place] as an Unsuccessful Attempt         Assessment/ Plan:     1. Osteopenia after menopause   TSH    T4, FREE      2. Subclinical hypothyroidism  TSH    T4, FREE        1. The patient's risk factors for osteoporosis were discussed including:  race, menopause, positive family history, and estrogen deficiency.  2. Daily recommended amounts of calcium (1200 mg) and vitamin D (1000 mg) were discussed. The patient was informed that calcium from food sources is preferred, as it is more easily absorbed by the body. Resources placed into Patient Instructions.  3. Fall precautions were discussed with the patient.  4. The importance of weight bearing exercise was emphasized.  5. FRAX score reviewed and pharmacologic treatment not indicated at this time.  6. Repeat thyroid testing and follow up with PCP as needed.      Follow up for annual well woman exam or as needed.            Natalia Kay MD  [facility]  11/26/2024

## 2024-12-19 ENCOUNTER — LAB VISIT (OUTPATIENT)
Dept: LAB | Facility: HOSPITAL | Age: 64
End: 2024-12-19
Payer: COMMERCIAL

## 2024-12-19 DIAGNOSIS — M85.80 OSTEOPENIA AFTER MENOPAUSE: ICD-10-CM

## 2024-12-19 DIAGNOSIS — E03.8 SUBCLINICAL HYPOTHYROIDISM: ICD-10-CM

## 2024-12-19 DIAGNOSIS — Z78.0 OSTEOPENIA AFTER MENOPAUSE: ICD-10-CM

## 2024-12-19 LAB
T4 FREE SERPL-MCNC: 0.88 NG/DL (ref 0.71–1.51)
TSH SERPL DL<=0.005 MIU/L-ACNC: 3.32 UIU/ML (ref 0.4–4)

## 2024-12-19 PROCEDURE — 36415 COLL VENOUS BLD VENIPUNCTURE: CPT | Mod: PO | Performed by: OBSTETRICS & GYNECOLOGY

## 2024-12-19 PROCEDURE — 84443 ASSAY THYROID STIM HORMONE: CPT | Performed by: OBSTETRICS & GYNECOLOGY

## 2024-12-19 PROCEDURE — 84439 ASSAY OF FREE THYROXINE: CPT | Performed by: OBSTETRICS & GYNECOLOGY

## 2025-01-02 ENCOUNTER — LAB VISIT (OUTPATIENT)
Dept: LAB | Facility: HOSPITAL | Age: 65
End: 2025-01-02
Payer: COMMERCIAL

## 2025-01-02 DIAGNOSIS — N39.0 RECURRENT UTI: ICD-10-CM

## 2025-01-02 PROCEDURE — 87086 URINE CULTURE/COLONY COUNT: CPT | Performed by: NURSE PRACTITIONER

## 2025-01-03 ENCOUNTER — PATIENT MESSAGE (OUTPATIENT)
Dept: UROLOGY | Facility: CLINIC | Age: 65
End: 2025-01-03
Payer: COMMERCIAL

## 2025-01-03 DIAGNOSIS — R39.9 UTI SYMPTOMS: Primary | ICD-10-CM

## 2025-01-03 LAB — BACTERIA UR CULT: NORMAL

## 2025-01-03 RX ORDER — NITROFURANTOIN 25; 75 MG/1; MG/1
100 CAPSULE ORAL 2 TIMES DAILY
Qty: 14 CAPSULE | Refills: 0 | Status: SHIPPED | OUTPATIENT
Start: 2025-01-03 | End: 2025-01-10

## 2025-01-06 ENCOUNTER — PATIENT MESSAGE (OUTPATIENT)
Dept: UROLOGY | Facility: CLINIC | Age: 65
End: 2025-01-06
Payer: COMMERCIAL

## 2025-03-13 ENCOUNTER — HOSPITAL ENCOUNTER (OUTPATIENT)
Dept: RADIOLOGY | Facility: OTHER | Age: 65
Discharge: HOME | End: 2025-03-13
Attending: NURSE PRACTITIONER
Payer: COMMERCIAL

## 2025-03-13 DIAGNOSIS — R92.30 DENSE BREAST TISSUE ON MAMMOGRAM, UNSPECIFIED TYPE: ICD-10-CM

## 2025-03-13 DIAGNOSIS — Z80.3 FAMILY HISTORY OF BREAST CANCER IN FIRST DEGREE RELATIVE: ICD-10-CM

## 2025-03-13 DIAGNOSIS — Z91.89 AT HIGH RISK FOR BREAST CANCER: ICD-10-CM

## 2025-03-13 PROCEDURE — A9577 INJ MULTIHANCE: HCPCS | Performed by: NURSE PRACTITIONER

## 2025-03-13 PROCEDURE — 25500020 PHARM REV CODE 255: Performed by: NURSE PRACTITIONER

## 2025-03-13 PROCEDURE — 77049 MRI BREAST C-+ W/CAD BI: CPT | Mod: TC

## 2025-03-13 PROCEDURE — 77049 MRI BREAST C-+ W/CAD BI: CPT | Mod: 26,,, | Performed by: RADIOLOGY

## 2025-03-13 RX ADMIN — GADOBENATE DIMEGLUMINE 10 ML: 529 INJECTION, SOLUTION INTRAVENOUS at 01:03

## 2025-05-14 DIAGNOSIS — N95.8 GENITOURINARY SYNDROME OF MENOPAUSE: ICD-10-CM

## 2025-05-21 RX ORDER — ESTRADIOL 0.1 MG/G
CREAM VAGINAL
Qty: 42.5 G | Refills: 0 | Status: SHIPPED | OUTPATIENT
Start: 2025-05-21

## 2025-07-17 ENCOUNTER — OFFICE VISIT (OUTPATIENT)
Dept: HEMATOLOGY/ONCOLOGY | Facility: CLINIC | Age: 65
End: 2025-07-17
Payer: COMMERCIAL

## 2025-07-17 ENCOUNTER — PATIENT MESSAGE (OUTPATIENT)
Dept: HEMATOLOGY/ONCOLOGY | Facility: CLINIC | Age: 65
End: 2025-07-17
Payer: COMMERCIAL

## 2025-07-17 ENCOUNTER — PATIENT MESSAGE (OUTPATIENT)
Dept: HEMATOLOGY/ONCOLOGY | Facility: CLINIC | Age: 65
End: 2025-07-17

## 2025-07-17 ENCOUNTER — TELEPHONE (OUTPATIENT)
Dept: HEMATOLOGY/ONCOLOGY | Facility: CLINIC | Age: 65
End: 2025-07-17

## 2025-07-17 VITALS
OXYGEN SATURATION: 97 % | BODY MASS INDEX: 23.78 KG/M2 | WEIGHT: 139.31 LBS | HEIGHT: 64 IN | DIASTOLIC BLOOD PRESSURE: 72 MMHG | HEART RATE: 83 BPM | SYSTOLIC BLOOD PRESSURE: 124 MMHG

## 2025-07-17 DIAGNOSIS — Z91.89 AT HIGH RISK FOR BREAST CANCER: Primary | ICD-10-CM

## 2025-07-17 DIAGNOSIS — R92.30 DENSE BREAST TISSUE ON MAMMOGRAM, UNSPECIFIED TYPE: ICD-10-CM

## 2025-07-17 DIAGNOSIS — Z80.3 FAMILY HISTORY OF BREAST CANCER IN FIRST DEGREE RELATIVE: ICD-10-CM

## 2025-07-17 DIAGNOSIS — R92.333 HETEROGENEOUSLY DENSE TISSUE OF BOTH BREASTS ON MAMMOGRAPHY: ICD-10-CM

## 2025-07-17 PROCEDURE — 3074F SYST BP LT 130 MM HG: CPT | Mod: CPTII,S$GLB,, | Performed by: NURSE PRACTITIONER

## 2025-07-17 PROCEDURE — 99999 PR PBB SHADOW E&M-EST. PATIENT-LVL III: CPT | Mod: PBBFAC,,, | Performed by: NURSE PRACTITIONER

## 2025-07-17 PROCEDURE — 3008F BODY MASS INDEX DOCD: CPT | Mod: CPTII,S$GLB,, | Performed by: NURSE PRACTITIONER

## 2025-07-17 PROCEDURE — G2211 COMPLEX E/M VISIT ADD ON: HCPCS | Mod: S$GLB,,, | Performed by: NURSE PRACTITIONER

## 2025-07-17 PROCEDURE — 3078F DIAST BP <80 MM HG: CPT | Mod: CPTII,S$GLB,, | Performed by: NURSE PRACTITIONER

## 2025-07-17 PROCEDURE — 99214 OFFICE O/P EST MOD 30 MIN: CPT | Mod: S$GLB,,, | Performed by: NURSE PRACTITIONER

## 2025-07-17 NOTE — PROGRESS NOTES
"  Reason For Consultation:   Increased lifetime risk of breast cancer    Referring Provider:   No referring provider defined for this encounter.    Records Obtained: Records of the patients history including those obtained from the referring provider were reviewed and summarized in detail.    HPI:   Xochitl Weiss presents for a follow up for increased risk of breast cancer. She is postmenopausal. She presented for screening mammogram on 22 which was benign but revealed a Tyrer-Cuzick score of 14.87%.  She has been seen in the high risk breast clinic in the past, last in 2018.  Has dense breast tissue and her mother had breast cancer.  MRI done 23 and results are still pending    Today, Feels generally well  No breast concerns.  Had breast implants removed in 2021    High Risk Breast cancer specific history:  - Age: 64 y.o.   - Height:  5'4"  - Weight: 138 lbs  - Breast density per BI-RADS:  Heterogeneously dense  - Age at menarche:  15  - Number of pregnancies: ; age of first live birth: 29   - History of breast feeding: Yes - about 8 weeks x2.   - Age at menopause, if applicable:  34, total hysterectomy  -Uterus and ovaries intact: No: hysterectomy at 34  - HRT: Yes - currently using estradiol inserts, took oral hormone replacement therapy in the past, stopped oral hrt more than 5 years ago    - Genetic testing: Yes - brca 1/2 testing back in , negative, also negative for granger mutation  - Personal history of cancer: No  - Previous chest radiation exposure between ages 10-30 years old: No  - Personal history of breast biopsy: Yes - 2, one was a lipoma, both benign, no atypia  - [employer] Inheritance: No  - Family history of cancer:  Mother: breast cancer, diagnosed at 68,  at 81, also had colon cancer, and uterine cancer which was diagnosed under 40  Maternal aunt: ovarian cancer  Maternal uncle: lung cancer  Father: colon cancer  Niece: thyroid cancer  Brothers: skin " cancer  Brother: prostate cancer    Social History:  Tobacco use:  none  Alcohol use:  socially, about 2 per week  Exercise regimen: three times per week, plays golf twice weekly and rides stationary bike  Employment: retired    SEE CALCULATED RISK BELOW.     Past Medical   Past Medical History:   Diagnosis Date    BRCA negative     Colon polyp     Diverticula of colon     Diverticulitis     Family history of breast cancer     mother    Family history of colon cancer     2 family members over age 60    General anesthetics causing adverse effect in therapeutic use     per patient report slow to awaken    Genetic testing 2014    negative Integrated BRACAnalysis    GERD (gastroesophageal reflux disease)     Hydronephrosis 4/29/2015    Kidney stones     Mild hyperlipidemia     Prediabetes 8/1/2023    Resolved with wt loss and diet changes     Patient Active Problem List   Diagnosis    Encounter for long-term (current) use of other medications    Family history of colon cancer    GERD (gastroesophageal reflux disease)    Family history of breast cancer    Diverticulitis    Premature menopause on HRT    Hemorrhoids, internal    History of diverticulitis    Food impaction of esophagus    Esophageal foreign body    Dysphagia    At high risk for breast cancer    Family history of colon cancer in father    Reflux esophagitis    History of nephrolithiasis     Social History   Social History     Tobacco Use    Smoking status: Never    Smokeless tobacco: Never   Substance Use Topics    Alcohol use: Yes     Alcohol/week: 4.0 standard drinks of alcohol     Types: 4 Glasses of wine per week     Comment: socially    Drug use: No     Family History  Family History   Problem Relation Name Age of Onset    Breast cancer Mother Alethea Solano Carlos Ferguson 68    Colon cancer Mother Alethea Solano Yadielkelly Marty 70        twice    Uterine cancer Mother Alethea Solano Carlos Ferguson 30    Diabetes Mother Alethea Solano Carlos Formanan     Hypertension Mother Alethea Solano Carlos  Marty     Cancer Mother Alethea Ferguson         Uterine, Breast & Colon    Colon cancer Father Col. Pal Formanan, Sr 68    Cancer Father Col. Pal Ferguson, Sr         Colon    No Known Problems Daughter      No Known Problems Son      Ovarian cancer Maternal Aunt  65    Lung cancer Maternal Uncle      Thyroid cancer Niece  35     labor Neg Hx      Eclampsia Neg Hx      Pancreatic cancer Neg Hx      Kidney cancer Neg Hx      Celiac disease Neg Hx      Cirrhosis Neg Hx      Colon polyps Neg Hx      Crohn's disease Neg Hx      Esophageal cancer Neg Hx      Inflammatory bowel disease Neg Hx      Liver cancer Neg Hx      Liver disease Neg Hx      Rectal cancer Neg Hx      Stomach cancer Neg Hx      Ulcerative colitis Neg Hx       Medications    Current Outpatient Medications:     estradioL (ESTRACE) 0.01 % (0.1 mg/gram) vaginal cream, PLACE 1 GRAM VAGINALLY 3 TIMES A WEEK, Disp: 42.5 g, Rfl: 0    pantoprazole (PROTONIX) 20 MG tablet, TAKE 1 TABLET(20 MG) BY MOUTH BEFORE BREAKFAST, Disp: 90 tablet, Rfl: 3    Current Facility-Administered Medications:     doxycycline tablet 100 mg, 100 mg, Oral, Once,     LIDOcaine HCl 2% urojet, , Urethral, Once,   Allergies  Review of patient's allergies indicates:  No Known Allergies    Review of Systems       See above   Review of Systems  Review of Systems   Constitutional:  Negative for appetite change and unexpected weight change.   HENT:  Negative for mouth sores.    Eyes:  Negative for visual disturbance.   Respiratory:  Negative for cough and shortness of breath.    Cardiovascular:  Negative for chest pain.   Gastrointestinal:  Negative for abdominal pain and diarrhea.   Genitourinary:  Negative for frequency.   Musculoskeletal:  Negative for back pain.   Skin:  Negative for rash.   Neurological:  Negative for headaches.   Hematological:  Negative for adenopathy.   Psychiatric/Behavioral:  The patient is not nervous/anxious.      All other systems reviewed and  "are negative.    Objective:      Vitals:   Vitals:    07/17/25 1355   BP: 124/72   BP Location: Left arm   Patient Position: Sitting   Pulse: 83   SpO2: 97%   Weight: 63.2 kg (139 lb 5.3 oz)   Height: 5' 4" (1.626 m)       BMI: Body mass index is 23.92 kg/m².   Body surface area is 1.69 meters squared.    Physical Exam  Constitutional:       General: She is not in acute distress.     Appearance: She is well-developed. She is not diaphoretic.   HENT:      Head: Normocephalic and atraumatic.   Eyes:      General: No scleral icterus.     Conjunctiva/sclera: Conjunctivae normal.   Cardiovascular:      Rate and Rhythm: Normal rate and regular rhythm.      Pulses: Normal pulses.      Heart sounds: Normal heart sounds.   Pulmonary:      Effort: Pulmonary effort is normal. No respiratory distress.      Breath sounds: Normal breath sounds.   Chest:      Comments: No skin changes or breast masses noted bilaterally, no  lymphadenopathy noted    Abdominal:      General: There is no distension.   Musculoskeletal:         General: Normal range of motion.      Cervical back: Normal range of motion and neck supple.   Skin:     General: Skin is warm and dry.   Neurological:      Mental Status: She is alert and oriented to person, place, and time.   Psychiatric:         Behavior: Behavior normal.         Laboratory Data: reviewed most recent   Imaging: reviewed most recent    Assessment:     1. At high risk for breast cancer    2. Family history of breast cancer in first degree relative    3. Dense breast tissue on mammogram, unspecified type    4. Heterogeneously dense tissue of both breasts on mammography        1. Increased risk of breast cancer   * Tyrer-Cuzick (TC) lifetime risk of 13.64% .  We discussed that TC score will categorize your lifetime risk of being diagnosed with breast cancer. Categories are as such: Average risk <15%, Intermediate risk 15-19%, and High risk > or = to 20%.    *The Mahsa Model for Breast Cancer risk: " She has a 4.4%  5-year breast cancer risk (Compared with 1.7% for the average 62 y.o. woman) and 20.6% Lifetime breast cancer risk (Compared with 9.3% for the average 62 y.o. woman). A woman's risk is considered low if her five-year risk of developing breast cancer is less than 1.6%; it is considered high if she scores above 1.66%. (All women who are over 60 have a score of at least 1.66 and are considered high risk, based on the Mahsa Model.)    Recommendation for women with elevated TC score:     Recommendation for women with elevated Mahsa Model.         Risk factors are categorized into 2 groups: Modifiable and Non-modifiable. Modifiable risk factors include use of hormones, alcohol, smoking, diet and exercise. Non-modifiable risk factors include breast density, genetics, chest radiation, previous pregnancies, age of first period, and age of menopause.      * For women at high risk for breast cancer, endocrine therapy can reduce the risk of invasive and/or in situ breast cancers. (tamoxifen for premenopausal or postmenopausal women and raloxifene or exemestane for postmenopausal women).     * Discussed that Tamoxifen 20 mg daily for 5 years has shown to reduce risk of breast cancer by 49% and women with ADH/ALH or LCIS have an even more significant reduction of risk of 86%. Aromatase inhibitors for 5 years have also shown risk reduction in terms of 50-60%. At current, there is not adequate data to recommend longer courses of therapy more than 5 years for risk reduction.      * Tamoxifen has limited data in BRCA 1/2 mutation carriers but limited retrospective data is suggestive of benefit. There is retrospective data that aromatase inhibitors can reduce the risk of contralateral ER positive breast cancers in BRCA 1/2 patients who were taking AIs as adjuvant therapy. There is no data for raloxifen in the population.      * Reviewed risks of Tamoxifen side effects include hot flashes, invasive endometrial cancer in  women > 49 years of age (2.3/1000 compared to 0.9/1000), cataracts, increased risk of pulmonary embolism among others.     * Reviewed Lifestyle modifications which have shown benefit:  Limit alcohol consumption to less than 1 drink per day (1 ounce liquor, 6 oz wine, 8 oz beer)  Avoid smoking.  Exercise at least 150 minutes per week of moderate intensity aerobic activity or at least 75 minutes of vigorous activity. Exercise can lower the relative risk of breast cancer by ~18-20%.  Maintain healthy weight and avoid post-menopausal weight gain. Avoid processed foods and eat more lean proteins, fruits and vegetables.                  * Discussed available resources including genetic counseling, nutrition, weight management.    * Reviewed future screening:   Semiannual (every 6 months) CBE (clinical breast exam).   Annual Breast MRI alternating with an annual MMG. if TC 20% or greater.                Discussed with patient that there are several models available for stratifying breast cancer risk, and Tyrer-Harveyzick is presently the model utilized by [facility] and is a model recommended per current NCCN guidelines.      The Mahsa Model for Breast Cancer risk estimates the absolute 5 year risk and lifetime risk of developing breast cancer. Family history includes only first degree relatives with breast cancer, which is not enough information to estimate the risk of a patient having BRCA mutation. It also underestimates the cancer risk for patients with extensive family history. The Mahsa Model is a good predictor of risk for populations but not for individuals. It adjusts risk for race/ethnicity. It may underestimate breast cancer risk in patients with atypical hyperplasia and strong family history. The Mahsa Model was NOT designed to estimate risk for: Women with a prior diagnosis of breast cancer, lobular carcinoma in situ (LCIS), or ductal carcinoma in situ (DCIS);  Women who have received previous radiation  therapy to the chest for treatment of Hodgkin lymphoma;  Women with gene mutations in BRCA1 or BRCA2, or those who are known to have certain genetic syndromes that increase risk for breast cancer; Women of age <35 or >85.          Plan:     Patient has opted out of chemoprevention but will call in the future if she wishes to pursue.   Patient elects to proceed with alternating annual mammogram and annual breast MRI along with semiannual CBEs.  Even with TC score being 13,64%, which is average risk, but her 10 year risk score is 6.9%, whish is high risk and her lyndsay model score is high risk. We will continue to alternate mammograms and MRIs  Patient will follow up with PCP or GYN for one semiannual CBE along with annual mammogram in September 2025 and will RTC here for one semiannual CBE in July 2026 along with annual breast MRI in March 2026.  Lifestyle modifications as detailed above.   5.   Encouraged breast awareness, including monthly breast self-exams.   6.   Patient has had some genetic testing in the past, but would like another referral to see if their are any new genes that need to be tested for.  Her testing was in 2014.     RTC in one year to see me either at [facility] or on 3rd floor..     Questions were encouraged and answered to patient's satisfaction, and patient verbalized understanding of information and agreement with the plan. Advised patient to RTC with any interval changes or concerns.      Patient is in agreement with the proposed treatment plan. All questions were answered to the patient's satisfaction. Patient knows to call clinic for any new or worsening symptoms and if anything is needed before the next clinic visit.    JENI Patrick      Med Onc Chart Routing      Follow up with physician    Follow up with GERARD 1 year. in person in the high risk breast clinic   Infusion scheduling note    Injection scheduling note    Labs   Scheduling:  Preferred lab:  Lab interval:  Bmp a few days  befor her MRI   Imaging MRI   MRI for March 2026   Pharmacy appointment    Other referrals         Additional referrals needed  genetics         Georgia Marino, NP-C  Hematology & Medical Oncology   [facility] [place] [address]  ph. [phone]  Fax. [phone]    Total time spent with the patient: 30 minutes, 20 minutes of face to face consultation and 10 minutes of chart review and coordination of care, on the day of the visit. This includes face to face time and non-face to face time preparing to see the patient (eg, review of tests), obtaining and/or reviewing separately obtained history, documenting clinical information in the electronic or other health record, independently interpreting resultsand communicating results to the patient/family/caregiver, or care coordination.

## 2025-07-17 NOTE — TELEPHONE ENCOUNTER
Called pt and left message. Meche requested me to call her and ask if she could come in at 130 instead of 330 on 7/17

## 2025-07-18 ENCOUNTER — PATIENT MESSAGE (OUTPATIENT)
Dept: HEMATOLOGY/ONCOLOGY | Facility: CLINIC | Age: 65
End: 2025-07-18
Payer: COMMERCIAL

## 2025-07-24 ENCOUNTER — TELEPHONE (OUTPATIENT)
Dept: HEMATOLOGY/ONCOLOGY | Facility: CLINIC | Age: 65
End: 2025-07-24
Payer: COMMERCIAL

## 2025-07-24 NOTE — TELEPHONE ENCOUNTER
Called patient and left message to confirm the virtual visit on 7/31/25 and to complete the questionnaire

## 2025-07-31 ENCOUNTER — OFFICE VISIT (OUTPATIENT)
Dept: OPTOMETRY | Facility: CLINIC | Age: 65
End: 2025-07-31
Payer: COMMERCIAL

## 2025-07-31 ENCOUNTER — PATIENT MESSAGE (OUTPATIENT)
Dept: HEMATOLOGY/ONCOLOGY | Facility: CLINIC | Age: 65
End: 2025-07-31

## 2025-07-31 ENCOUNTER — OFFICE VISIT (OUTPATIENT)
Dept: HEMATOLOGY/ONCOLOGY | Facility: CLINIC | Age: 65
End: 2025-07-31
Payer: COMMERCIAL

## 2025-07-31 DIAGNOSIS — H47.092 OPTIC NERVE ASYMMETRY, LEFT: Primary | ICD-10-CM

## 2025-07-31 DIAGNOSIS — Z80.3 FAMILY HISTORY OF BREAST CANCER IN FIRST DEGREE RELATIVE: ICD-10-CM

## 2025-07-31 DIAGNOSIS — Z80.41 FAMILY HISTORY OF OVARIAN CANCER: ICD-10-CM

## 2025-07-31 DIAGNOSIS — H25.13 NS (NUCLEAR SCLEROSIS), BILATERAL: ICD-10-CM

## 2025-07-31 DIAGNOSIS — Z98.890 S/P LASIK SURGERY OF BOTH EYES: ICD-10-CM

## 2025-07-31 DIAGNOSIS — Z71.83 ENCOUNTER FOR NONPROCREATIVE GENETIC COUNSELING: Primary | ICD-10-CM

## 2025-07-31 DIAGNOSIS — Z83.511 FAMILY HISTORY OF GLAUCOMA: ICD-10-CM

## 2025-07-31 PROCEDURE — 1159F MED LIST DOCD IN RCRD: CPT | Mod: CPTII,S$GLB,, | Performed by: OPTOMETRIST

## 2025-07-31 PROCEDURE — 92014 COMPRE OPH EXAM EST PT 1/>: CPT | Mod: S$GLB,,, | Performed by: OPTOMETRIST

## 2025-07-31 PROCEDURE — 99999 PR PBB SHADOW E&M-EST. PATIENT-LVL II: CPT | Mod: PBBFAC,,, | Performed by: OPTOMETRIST

## 2025-07-31 NOTE — PROGRESS NOTES
"Cancer Genetics  Department of Hematology and Oncology  [facility] [place]    Date of Service:  25  Visit Provider:  Js Batista DNP  Collaborating Physician:  Beth Garcia MD    Patient ID  Name: Xochitl Weiss    : 1960    MRN: [id number]      Referring Provider  Georgia Marino NP  [address] [place] [address]    Televisit Information  The patient location is:  [place], [place]  The chief complaint leading to consultation is: As below    Visit type: audiovisual    Face to Face time with patient: 34 minutes  66 minutes of total time spent on the encounter, which includes face to face time and non-face to face time preparing to see the patient (eg, review of tests), Obtaining and/or reviewing separately obtained history, Documenting clinical information in the electronic or other health record, Independently interpreting results (not separately reported) and communicating results to the patient/family/caregiver, or Care coordination (not separately reported).         Each patient to whom he or she provides medical services by telemedicine is:  (1) informed of the relationship between the physician and patient and the respective role of any other health care provider with respect to management of the patient; and (2) notified that he or she may decline to receive medical services by telemedicine and may withdraw from such care at any time.    Notes:   SUBJECTIVE      Chief Complaint: Genetic Evaluation    History of Present Illness (HPI):  Xochitl Weiss ("Xochitl"), 64 y.o., assigned female sex at birth, is established with the [facility] of [facility] but new to me.  She was referred by Georgia Marino NP, for cancer-genetic risk assessment given her family cancer history.    ONCOLOGY PEDIGREE  Other than noted, no known family history of cancer, benign tumor/mass/lesion, or colon polyps.  If this " "pedigree appears small/illegible on your screen, expand this note window horizontally.  Also:  1 or 2 of Xochitl's brothers each with 1-2 polyps, Xochitl believes.      Personal Cancer-Related History  Cancer:  No   Colon polyp:  As below    02/12/2007 (age 46) colonoscopy:  No polyps  01/25/2012 (age 51) colonoscopy:  No polyps  03/08/2017 (age 56) colonoscopy:  No polyps  11/22/2019 (age 59) colonoscopy:  No polyps  03/31/2022 (age 61) colonoscopy:  No polyps  11/14/2024 (age 64) colonoscopy:  A 1-mm polyp was removed from the cecum, with pathology indicating "Colonic mucosa with lymphoid aggregate"  Additional colonoscopies:  None  Other tumor or pertinent mass/lesion:  Yes  Lipoma removed from right torso (under bra-strap area)  Pancreatitis:  No  Personal history of cancer-genetic testing:  Yes            Other Pertinent Medical History  Blood disorder:  No    Focused Surgical History  S/p hyst/BSO at age 34 (per patient)    Focused Social History  Never smoker    Focused Family History  Ashkenazi Worship ancestry:  No  Consanguinity:  No  Family history of cancer-genetic testing:  No    Review of Systems  See HPI.       OBJECTIVE     Patient Active Problem List    Diagnosis Date Noted    History of nephrolithiasis 08/01/2023    Reflux esophagitis 10/26/2021    Family history of colon cancer in father 11/22/2019    At high risk for breast cancer 10/07/2019    Dysphagia 06/28/2017    Food impaction of esophagus 05/30/2017    Esophageal foreign body 05/30/2017    Family history of breast cancer 04/06/2017    Diverticulitis 04/06/2017    Premature menopause on HRT 04/06/2017    Hemorrhoids, internal 04/06/2017    History of diverticulitis 04/06/2017    Encounter for long-term (current) use of other medications 01/10/2013    Family history of colon cancer 01/10/2013    GERD (gastroesophageal reflux disease) 01/10/2013     Past Medical History:   Diagnosis Date    BRCA negative     Colon polyp     Diverticula of colon  " "   Diverticulitis     Family history of breast cancer     mother    Family history of colon cancer     2 family members over age 60    General anesthetics causing adverse effect in therapeutic use     per patient report slow to awaken    Genetic testing 2014    negative Integrated [facility]ACAnalysis    GERD (gastroesophageal reflux disease)     Hydronephrosis 04/29/2015    Kidney stones     Lipoma of torso     Mild hyperlipidemia     Prediabetes 08/01/2023    Resolved with wt loss and diet changes     Physical Exam  Significantly limited secondary to the inherent nature of a virtual visit.  Very pleasant patient.  Unaccompanied on this virtual visit.  Constitutional      Appearance:  Appears well developed and well nourished. No distress.   Neurological     Mental Status:  Alert and oriented.  Psychiatric         Mood and Affect:  Normal.     Thought Content:  Normal.     Speech:  Normal.     Behavior:  Normal.     Judgment:  Normal.    ASSESSMENT / PLAN      Xochitl Weiss ("Xochitl"), 64 y.o., assigned female sex at birth, is established with the [facility] but new to me.  She was referred by Georgia Marino NP, for cancer-genetic risk assessment given her family cancer history.    PRE-TEST GENETIC COUNSELING    Xochitl meets current National Comprehensive Cancer Network (NCCN) criteria for germline testing of breast cancer susceptibility genes based on her maternal family history of breast and ovarian cancers.    If Xochitl's mother's uterine cancer was endometrial, then Xochitl meets current NCCN criteria for germline testing of Stephen syndrome genes based on her maternal family cancer history.  These were previously tested in Xochitl with negative results.    Xochitl's previous hereditary cancer genetic testing consisted of DNA analysis.  Presently, there is an option to analyze not only DNA but also RNA on many hereditary cancer genes.  RNA analysis is useful for reducing rates of " variants of uncertain significance (VUSs) in some individuals and may identify a harmful variant outside of DNA analysis's usual reportable range.  The yield of RNA analysis is low but should be taken into consideration.  One study¹ involving 20,317 individuals who underwent both DNA and RNA analyses of 63 hereditary cancer-related genes through a certain laboratory found that for 1,279 (6.29%) of the 20,317, RNA analysis led to reclassification of a DNA variant that would have been classified as a variant of uncertain significance (VUS) if not for the RNA analysis.  Of these, 75 (3.69% of the 20,317) had a VUS upgraded to a pathogenic or likely pathogenic variant, while 1,204 (5.9% of the 20,317) had a VUS downgraded to a benign or likely benign variant.  For 40 (1.96%) of the 20,317, RNA analysis detected a variant located outside of the usual reportable range -- 8 of these were pathogenic or likely pathogenic, 2 were benign or likely benign, and 30 were VUSs.  Another study² involving 43,524 individuals who underwent both DNA and RNA analyses for a hereditary cancer indication at a certain laboratory found deep intronic variants that would not have been detected using DNA analysis alone in 27 (0.06% of the 43,524) individuals, led to reclassification of a VUS to a pathogenic or likely pathogenic variant in 70 (0.16% of the 43,524) individuals, and led to VUS downgrading in 305 (0.7% of the 43,524) individuals.  Furthermore, RNA evidence led to 186 likely pathogenic or likely benign variants being upgraded to pathogenic or downgraded to benign, respectively.    RNA References:  ¹ BRADY Naranjo., Mike, ISABEL RUSSO., DIEUDONNE Su, SUZANNE Apodaca, WILIAN Morfin, JOY Franks., Mike S., Sharri, R. L., & ELICEO Reed. (2023). A systematic method for detecting abnormal mRNA splicing and assessing its clinical impact in individuals undergoing genetic testing for hereditary cancer syndromes. The Journal of Molecular  Diagnostics, 25(3). https://doi.org/10.1016/j.jmoldx.2022.12.002  ² JC Ma, IDEUDONNE Martini, SADIQ Urias, JC Calvillo, JOY Bocanegra, JAYLON Odom, SADQI Manzo, RUFINO Saez, CLAUDIA Moreira, KARAN Raines., FRANCIS Hawley., SUZANNE Bailey, WILIAN Galvan, & ALESIA Duncan (2023). Diagnostic outcomes of concurrent DNA and RNA sequencing in individuals undergoing hereditary cancer testing. [employer] On[employer], 10(2), 212-219. https://doi.org/10.1001/jamaoncol.2023.5586      Cancer Genetics  Germline cancer-genetic testing is the testing of genes associated with cancer, known as cancer susceptibility genes.  Just as these genes are inherited from the parents--one copy of each gene from each parent--mutations in these genes can be inherited, as well.  A mutation in a cancer susceptibility gene adversely affects the gene's ability to prevent cancer; therefore, carriers of cancer susceptibility gene mutations may be at increased risk for certain cancers.     Causes of Cancer  Only approximately 5%-10% of cancers are caused by an inherited cancer susceptibility gene mutation; rather, the majority of cancers are sporadic.  Causes of sporadic cancer may include environmental risk factors, lifestyle risk factors, and non-modifiable risk factors.  Family members often share not only genetics but also other risk factors, including environmental and lifestyle risk factors, so cancers can be familial.     Potential Results of Genetic Testing, and Their Implications  Potential results of genetic testing include positive, negative, and variant of unknown significance (VUS).    Positive:  A positive result indicates the presence of at least one clinically significant gene mutation, and the individual's associated cancer risks vary depending upon the cancer susceptibility gene(s) in which there is/are a mutation(s).  With a positive result, depending upon the specific result and the individual's clinical history, modified risk management may be recommended,  including measures for risk reduction and/or surveillance; however, there are not always effective strategies for risk management.  There may be reproductive implications of a positive genetic test result, and there may be cancer-treatment implications of a positive genetic test result.  Negative:  A negative result indicates that no clinically significant mutations were identified in the gene(s) tested.  A negative result does not indicate that that individual cannot develop cancer, and, in fact, that individual may even be at increased risk for cancer based on shared risk factors with affected relatives.  The ability to interpret the meaning of a negative genetic testing result is limited in an individual unaffected with cancer whose affected relatives have not first undergone such testing.  The most informative family member to undergo testing for hereditary cancer syndromes first are those who have personally had cancer.    VUS:  A VUS is a genetic change for which there is not presently enough data for the laboratory to make a determination as to whether the genetic variant is clinically significant.  VUSs are not typically acted upon clinically.       Genetic Mutation Inheritance  When an individual tests positive for a gene mutation, their first-degree relatives each have a 50% chance of carrying the same mutation, and other, more distant blood relatives may also be at risk of carrying the same mutation.      Genetic Discrimination  Genetic discrimination occurs when an individual is discriminated against on the basis of their genetic information.  The Genetic Information Nondiscrimination Act of 2008 (DIEGO) is [place] federal legislation that provides some protections against the use of an individual's genetic information by their health insurer and by their employer.  Title I of DIEGO prohibits most health insurers from utilizing an individual's genetic information to make decisions regarding insurance  "eligibility or premium charges; this protection does not apply to health insurance obtained through a job with the , and it may not apply to health insurance obtained through the Federal Employees Health Benefits Plan.  Title II of DIEGO prohibits covered entities, in many cases, from requesting or requiring the genetic information of employees and applicants and from using said information to make employment decisions; this protection does not apply to employers with fewer than 15 employees or to the .  DIEGO does not protect individuals from genetic discrimination by any other type of policy or entity, including but not limited to life insurance, disability insurance, long-term care insurance,  benefits, and [facility] benefits.    Genetic Testing Logistics  An outside laboratory would perform the testing after a blood sample is collected at [facility].  The test is expected to take approximately three weeks to result.  The patient may incur out-of-pocket costs related to genetic testing.  Post-test genetic counseling can be conducted once the genetic testing results are available.              DIAGNOSES AND ORDERS FOR THIS ENCOUNTER    ICD-10-CM ICD-9-CM   1. Encounter for nonprocreative genetic counseling  Z71.83 V26.33   2. Family history of breast cancer in first degree relative  Z80.3 V16.3   3. Family history of ovarian cancer  Z80.41 V16.41     1. Encounter for nonprocreative genetic counseling  Xochitl Weiss ("Xochitl"), 64 y.o., assigned female sex at birth, is established with the [facility] but new to me.  She was referred by Georgia Marino NP, for cancer-genetic risk assessment given her family cancer history.    Xochitl meets current National Comprehensive Cancer Network (NCCN) criteria for germline testing of breast cancer susceptibility genes based on her maternal family history of breast and ovarian cancers.    If Xochitl's " mother's uterine cancer was endometrial, then Xochitl meets current NCCN criteria for germline testing of Stephen syndrome genes based on her maternal family cancer history.  These were previously tested in Xochitl with negative results.    Pre-test genetic counseling was conducted.    Offered Xochitl options of proceeding with hereditary cancer susceptibility gene testing at this time versus delaying/declining such.  Xochitl desires to proceed with such testing at this time.  Provided germline cancer genetic test options of focused panel versus broad panel, and Xochitl opts for the latter.     Testing lab: [facility] powered by [facility]   Test panel: xG+ with [facility]   ICD-10 code(s): Z80.3   Z80.41      Verbal informed consent: Obtained   Specimen type: Blood   Specimen collection by: [facility]   Specimen collection date: To be scheduled   Results expected by: Approximately 3 weeks after the genetic testing lab receives the specimen   Post-test genetic counseling: ~4 weeks after sample collection     2. Family history of breast cancer in first degree relative  - Ambulatory referral/consult to Genetics:  Completed  - [facility] - Hereditary Cancer with RNA; Future    3. Family history of ovarian cancer  - [facility] - Hereditary Cancer with RNA; Future    Follow-up:  Follow up in about 1 month (around 9/2/2025) for post-test genetic counseling.      Questions were encouraged and answered to the patient's satisfaction, and she verbalized understanding of the information and agreement with the plan.  Advised Xcohitl that pertinent information will be accessible in this visit note for her review.        Js Batista, DNP, APRN, FNP-BC, AOCNP, CGRA  Nurse Practitioner, Cancer Genetics  Department of Hematology and Oncology  [facility]        CC:  Georgia Marino         Routed to Cancer Genetics Staff:  - Please schedule [facility] x[facility]+ (+RNA) blood work at  [facility] for 8/05 at 8am AND move existing 8/05 lab appt (Dr. Stoddard's labs) to that same time and location (pt already aware).  - Please schedule 30-min post-test virtual for around 9/02.        Portions of the record may have been created with voice-recognition software.  Occasional wrong-word or sound-a-like substitutions may have occurred due to the inherent limitations of voice-recognition software.  Read the chart carefully, and recognize, using context, where substitutions have occurred.

## 2025-07-31 NOTE — Clinical Note
- Please schedule [facility] xG+ (+RNA) blood work at [place] for 8/05 at 8am AND move existing 8/05 lab appt (Dr. Stoddard's labs) to that same time and location (pt already aware). - Please schedule 30-min post-test virtual for around 9/02.

## 2025-07-31 NOTE — PROGRESS NOTES
HPI    Last eye exam was 5/2/24 with Dr. Ferguson.  Patient states distance vision is fine. Uses +1.50-+1.75 OTC readers for   small print.  Patient denies diplopia, headaches, flashes/floaters, and pain.    Last edited by Catia Wheatley MA on 7/31/2025  1:55 PM.            Assessment /Plan     For exam results, see Encounter Report.           Nuclear sclerosis, bilateral              Mild, not visually significant     S/P LASIK (laser assisted in situ keratomileusis) of both eyes  Presbyopia        Ok to continue with readers PRN     Family history of glaucoma in mother and sister  Optic nerve asymmetry, left             2024  OCT optic nerve : Borderline thin OU              Repeat next visit      Refractive error              Rx specs SV with AR for driving pRN     RTC 1 year, OCT optic nerve

## 2025-08-05 ENCOUNTER — LAB VISIT (OUTPATIENT)
Dept: LAB | Facility: HOSPITAL | Age: 65
End: 2025-08-05
Attending: INTERNAL MEDICINE
Payer: COMMERCIAL

## 2025-08-05 DIAGNOSIS — Z80.41 FAMILY HISTORY OF OVARIAN CANCER: ICD-10-CM

## 2025-08-05 DIAGNOSIS — Z00.00 ANNUAL PHYSICAL EXAM: ICD-10-CM

## 2025-08-05 DIAGNOSIS — Z80.3 FAMILY HISTORY OF BREAST CANCER IN FIRST DEGREE RELATIVE: ICD-10-CM

## 2025-08-05 LAB
25(OH)D3+25(OH)D2 SERPL-MCNC: 79 NG/ML (ref 30–96)
ABSOLUTE EOSINOPHIL (OHS): 0.07 K/UL
ABSOLUTE MONOCYTE (OHS): 0.46 K/UL (ref 0.3–1)
ABSOLUTE NEUTROPHIL COUNT (OHS): 3.39 K/UL (ref 1.8–7.7)
ALBUMIN SERPL BCP-MCNC: 4 G/DL (ref 3.5–5.2)
ALP SERPL-CCNC: 58 UNIT/L (ref 40–150)
ALT SERPL W/O P-5'-P-CCNC: 15 UNIT/L (ref 0–55)
ANION GAP (OHS): 10 MMOL/L (ref 8–16)
AST SERPL-CCNC: 25 UNIT/L (ref 0–50)
BASOPHILS # BLD AUTO: 0.02 K/UL
BASOPHILS NFR BLD AUTO: 0.3 %
BILIRUB SERPL-MCNC: 0.6 MG/DL (ref 0.1–1)
BUN SERPL-MCNC: 15 MG/DL (ref 8–23)
CALCIUM SERPL-MCNC: 9.1 MG/DL (ref 8.7–10.5)
CHLORIDE SERPL-SCNC: 107 MMOL/L (ref 95–110)
CHOLEST SERPL-MCNC: 234 MG/DL (ref 120–199)
CHOLEST/HDLC SERPL: 3.4 {RATIO} (ref 2–5)
CO2 SERPL-SCNC: 24 MMOL/L (ref 23–29)
CREAT SERPL-MCNC: 0.8 MG/DL (ref 0.5–1.4)
EAG (OHS): 111 MG/DL (ref 68–131)
ERYTHROCYTE [DISTWIDTH] IN BLOOD BY AUTOMATED COUNT: 11.9 % (ref 11.5–14.5)
GFR SERPLBLD CREATININE-BSD FMLA CKD-EPI: >60 ML/MIN/1.73/M2
GLUCOSE SERPL-MCNC: 98 MG/DL (ref 70–110)
HBA1C MFR BLD: 5.5 % (ref 4–5.6)
HCT VFR BLD AUTO: 42.8 % (ref 37–48.5)
HDLC SERPL-MCNC: 68 MG/DL (ref 40–75)
HDLC SERPL: 29.1 % (ref 20–50)
HGB BLD-MCNC: 14.3 GM/DL (ref 12–16)
IMM GRANULOCYTES # BLD AUTO: 0.01 K/UL (ref 0–0.04)
IMM GRANULOCYTES NFR BLD AUTO: 0.2 % (ref 0–0.5)
LDLC SERPL CALC-MCNC: 145.8 MG/DL (ref 63–159)
LYMPHOCYTES # BLD AUTO: 2.24 K/UL (ref 1–4.8)
MAGNESIUM SERPL-MCNC: 2 MG/DL (ref 1.6–2.6)
MCH RBC QN AUTO: 29.2 PG (ref 27–31)
MCHC RBC AUTO-ENTMCNC: 33.4 G/DL (ref 32–36)
MCV RBC AUTO: 87 FL (ref 82–98)
NONHDLC SERPL-MCNC: 166 MG/DL
NUCLEATED RBC (/100WBC) (OHS): 0 /100 WBC
PLATELET # BLD AUTO: 240 K/UL (ref 150–450)
PMV BLD AUTO: 8.9 FL (ref 9.2–12.9)
POTASSIUM SERPL-SCNC: 4.5 MMOL/L (ref 3.5–5.1)
PROT SERPL-MCNC: 7 GM/DL (ref 6–8.4)
RBC # BLD AUTO: 4.9 M/UL (ref 4–5.4)
RELATIVE EOSINOPHIL (OHS): 1.1 %
RELATIVE LYMPHOCYTE (OHS): 36.2 % (ref 18–48)
RELATIVE MONOCYTE (OHS): 7.4 % (ref 4–15)
RELATIVE NEUTROPHIL (OHS): 54.8 % (ref 38–73)
SODIUM SERPL-SCNC: 141 MMOL/L (ref 136–145)
T4 FREE SERPL-MCNC: 1.09 NG/DL (ref 0.71–1.51)
TRIGL SERPL-MCNC: 101 MG/DL (ref 30–150)
TSH SERPL-ACNC: 2.47 UIU/ML (ref 0.4–4)
VIT B12 SERPL-MCNC: 607 PG/ML (ref 210–950)
WBC # BLD AUTO: 6.19 K/UL (ref 3.9–12.7)

## 2025-08-05 PROCEDURE — 84443 ASSAY THYROID STIM HORMONE: CPT

## 2025-08-05 PROCEDURE — 82607 VITAMIN B-12: CPT

## 2025-08-05 PROCEDURE — 82306 VITAMIN D 25 HYDROXY: CPT

## 2025-08-05 PROCEDURE — 83036 HEMOGLOBIN GLYCOSYLATED A1C: CPT

## 2025-08-05 PROCEDURE — 82947 ASSAY GLUCOSE BLOOD QUANT: CPT

## 2025-08-05 PROCEDURE — 83735 ASSAY OF MAGNESIUM: CPT

## 2025-08-05 PROCEDURE — 82465 ASSAY BLD/SERUM CHOLESTEROL: CPT

## 2025-08-05 PROCEDURE — 82040 ASSAY OF SERUM ALBUMIN: CPT

## 2025-08-05 PROCEDURE — 36415 COLL VENOUS BLD VENIPUNCTURE: CPT

## 2025-08-05 PROCEDURE — 85025 COMPLETE CBC W/AUTO DIFF WBC: CPT

## 2025-08-05 PROCEDURE — 84439 ASSAY OF FREE THYROXINE: CPT

## 2025-08-09 DIAGNOSIS — K21.9 GASTROESOPHAGEAL REFLUX DISEASE, UNSPECIFIED WHETHER ESOPHAGITIS PRESENT: ICD-10-CM

## 2025-08-09 RX ORDER — PANTOPRAZOLE SODIUM 20 MG/1
TABLET, DELAYED RELEASE ORAL
Qty: 90 TABLET | Refills: 3 | Status: SHIPPED | OUTPATIENT
Start: 2025-08-09

## 2025-08-14 LAB
GENE DIS ANL INTERP-IMP: NORMAL
TEMPUS GENES ANALYZED: NORMAL
TEMPUS INTERPRETATION REPORT: NORMAL
TEMPUS PORTAL: NORMAL

## 2025-08-18 DIAGNOSIS — N95.8 GENITOURINARY SYNDROME OF MENOPAUSE: ICD-10-CM

## 2025-08-19 RX ORDER — ESTRADIOL 0.1 MG/G
CREAM VAGINAL
Qty: 42.5 G | Refills: 0 | Status: SHIPPED | OUTPATIENT
Start: 2025-08-19

## 2025-08-20 ENCOUNTER — PATIENT MESSAGE (OUTPATIENT)
Dept: UROLOGY | Facility: CLINIC | Age: 65
End: 2025-08-20
Payer: COMMERCIAL

## 2025-09-05 ENCOUNTER — HOSPITAL ENCOUNTER (OUTPATIENT)
Dept: RADIOLOGY | Facility: OTHER | Age: 65
Discharge: HOME | End: 2025-09-05
Attending: OBSTETRICS & GYNECOLOGY
Payer: COMMERCIAL

## 2025-09-05 DIAGNOSIS — Z12.31 BREAST CANCER SCREENING BY MAMMOGRAM: ICD-10-CM

## 2025-09-05 PROCEDURE — 77063 BREAST TOMOSYNTHESIS BI: CPT | Mod: TC

## (undated) DEVICE — NDL 18GA X1 1/2 REG BEVEL

## (undated) DEVICE — PAD UNDERPAD 30X30

## (undated) DEVICE — GLOVE BIOGEL SKINSENSE PI 7.0

## (undated) DEVICE — SUT 4/0 18IN ETHILON BL P3

## (undated) DEVICE — PACK UPPER EXTREMITY BAPTIST

## (undated) DEVICE — UNDERGLOVES BIOGEL PI SIZE 8

## (undated) DEVICE — PADDING CAST SOFT-ROLL 6 X 4

## (undated) DEVICE — PADDING CAST SOFT-ROLL 4 X 4

## (undated) DEVICE — ALCOHOL 70% ISOP W/GREEN 16OZ

## (undated) DEVICE — UNDERGLOVE BIOGEL PI SZ 6.5 LF

## (undated) DEVICE — SEE MEDLINE ITEM 152529

## (undated) DEVICE — SYR 10CC LUER LOCK

## (undated) DEVICE — GLOVE BIOGEL SKINSENSE PI 7.5

## (undated) DEVICE — PAD CAST SPECIALIST STRL 3

## (undated) DEVICE — GAUZE SPONGE 4'X4 12 PLY

## (undated) DEVICE — SEE MEDLINE ITEM 146308

## (undated) DEVICE — SOL 9P NACL IRR PIC IL

## (undated) DEVICE — SEE MEDLINE ITEM 152560

## (undated) DEVICE — NDL HYPO REG 25G X 1 1/2

## (undated) DEVICE — SUT ETHILON 5-0 P3 18IN BLK

## (undated) DEVICE — TOURNIQUET SB QC SP 18X4IN

## (undated) DEVICE — DRESSING XEROFORM FOIL PK 1X8

## (undated) DEVICE — CORD FOR BIPOLAR FORCEPS 12

## (undated) DEVICE — GLOVE BIOGEL SKINSENSE PI 6.5

## (undated) DEVICE — GAUZE SPONGE 4X4 12PLY